# Patient Record
Sex: FEMALE
[De-identification: names, ages, dates, MRNs, and addresses within clinical notes are randomized per-mention and may not be internally consistent; named-entity substitution may affect disease eponyms.]

---

## 2017-03-15 NOTE — RAD
HISTORY:

Sepsis Patient  



COMPARISON:

02/03/2017 



FINDINGS:



LUNGS:

Developing hazy opacity overlying the right upper lobe. Mild 

opacities in the lung bases.



PLEURA:

No significant pleural effusion identified, no pneumothorax 

apparent.Biapical pleural parenchymal thickening noted. 



CARDIOVASCULAR:

Normal.



OSSEOUS STRUCTURES:

The osseous structures demonstrate degenerative changes. 



VISUALIZED UPPER ABDOMEN:

Rounded opacity overlying the projection of the subdiaphragmatic left 

upper abdomen, stable. Otherwise the abdomen remains suboptimally 

seen. 



OTHER FINDINGS:

None.



IMPRESSION:

Developing hazy opacity overlying the right upper lobe. Infectious 

etiologies can be considered.  Follow-up should be obtained.

## 2017-03-15 NOTE — C.PDOC
History Of Present Illness


77F biba from snf for fever. pt denies any complaints, unable to provide 

further hx. 


Time Seen by Provider: 03/15/17 00:40


Chief Complaint (Nursing): Fever





Past Medical History


Vital Signs: 


 Last Vital Signs











Temp  99.3 F   03/18/17 04:00


 


Pulse  63   03/18/17 04:00


 


Resp  20   03/18/17 04:00


 


BP  93/47 L  03/18/17 10:44


 


Pulse Ox  100   03/18/17 04:00














- Medical History


PMH: Anxiety, Arthritis, Back Problems, Gall Bladder Disease, HTN (STRESS TEST 

2015 DR FORD), Seizures (last one about 15 years ago)


Surgical History: Appendectomy (at 16 years old), Cholecystectomy (around 1978)





- [facility] Procedures








ANESTH INJECT-SPIN CANAL (10/03/14)


EXCISION OF ASCENDING COLON, ENDO, DIAGN (02/09/17)


EXCISION OF SIGMOID COLON, ENDO, DIAGN (02/09/17)


EXCISION OF TRANSVERSE COLON, ENDO, DIAGN (02/09/17)


INJECT STEROID (07/31/15)


INTRODUCTION OF ANTI-INFLAMMATORY INTO JOINTS, PERC APPROACH (02/09/17)


SPINAL CANAL INJECT NEC (07/31/15)








Family History: States: Unknown Family Hx





- Social History


Hx Tobacco Use: No


Hx Alcohol Use: No


Hx Substance Use: No





- Immunization History


Hx Tetanus Toxoid Vaccination: Yes


Hx Influenza Vaccination: No


Hx Pneumococcal Vaccination: No





Review Of Systems


Review Of Systems: ROS cannot be obtained secondary to pt's inabilty to answer 

questions.





Physical Exam





- Physical Exam


Appears: No Acute Distress


Skin: Warm, Dry


Head: Atraumatic


Eye(s): bilateral: PERRL, EOMI


Oral Mucosa: Dry


Neck: Normal ROM


Cardiovascular: Rhythm Regular


Respiratory: No Decreased Breath Sounds, No Accessory Muscle Use


Gastrointestinal/Abdominal: Soft, No Tenderness, No Distention


Extremity: No Swelling


Pulses: Left Radial: Normal, Right Radial: Normal


Neurological/Psych: Other (no focla deficits)





ED Course And Treatment





- Laboratory Results


Result Diagrams: 


 03/18/17 06:36





 03/18/17 06:36


O2 Sat by Pulse Oximetry: 90





Critical Care Time





- Critical Care Note


Total Time (in mins): 45


Documented critical care: time excludes all time spent performing seperately 

billable procedures.





Endotracheal Intubation





- Endotracheal Intubation


Intubated With ETT Size: 7 (7.5)


Blade Type Used: Curved (glide scope)


Intubated: Orally


Pre-Intubation Airway Assessment: Ventilated And Oxygenated


Medications Used During Pre-Intubation: Etomidate


Paralyzed With: Succinylcholine


Post-Intubation Assessment: ETT Secured AT (cm): (23), Breath Sounds Equal Bilat

, Placement Confirmed Via CXR, Color Change W/End Tidal CO2 Detector





Medical Decision Making


Medical Decision Making: 


cxr- mild venous congestion





The pts BP improved w IVF bolus from low 80s to high 90s/100s





250am shortly after starting vanco the pt turned diffusely red and developed 

acute resp failure w spo2 into 60's and altered mental status. for this reason 

she was intubated emergently. 





ulysses garcía pcp dr vanessa garcía ICU doctor for admission





Disposition





- Disposition


Disposition: HOSPITALIZED


Disposition Time: 03:12


Condition: GUARDED





- Clinical Impression


Clinical Impression: 


 Sepsis, Acute respiratory failure

## 2017-03-15 NOTE — RAD
HISTORY:

TLC placement  



COMPARISON:

03/15/2017. 



FINDINGS:



LUNGS:

Re- demonstration of possible hazy opacity in the right upper lobe.



PLEURA:

No significant pleural effusion identified, no pneumothorax apparent.



CARDIOVASCULAR:

Normal.



OSSEOUS STRUCTURES:

The osseous structures demonstrate degenerative changes. 



VISUALIZED UPPER ABDOMEN:

Upper abdomen is suboptimally evaluated. Re- demonstration of a 

circular opacity in the left upper quadrant. 



OTHER FINDINGS:

Interval introduction of presumed vascular catheter with the distal 

tip overlying the projection of the expected location of the SVC.



IMPRESSION:

Findings as above.

## 2017-03-15 NOTE — CT
EXAM:

  CT Head Without Intravenous Contrast.



CLINICAL HISTORY:

  77 years old, female; Pain; Other: Sepsis; Patient HX: 2-3-17; Additional 

info: AMS



TECHNIQUE:

  Axial computed tomography images of the head/brain without intravenous 

contrast.  This CT exam was performed using one or more of the following dose 

reduction techniques:  automated exposure control, adjustment of the mA and/or 

kV according to patient size, and/or use of iterative reconstruction technique.



COMPARISON:

  CT - HEAD W/O CONTRAST 2/3/2017 8:24:51 PM



FINDINGS:

  Limitations:  Motion artifact - mild.  Streak artifact - mild.

  Brain:  Mild-to-moderate atrophy.  No definite intracranial hemorrhage.  No 

mass.  Dilated perivascular space vs chronic lacunar infarct about RIGHT basal 

ganglia.  No definite edema.

  Ventricles:  No hydrocephalus.

  Bones/joints:  No acute fracture.

  Soft tissues:  Unremarkable.

  Sinuses:  No acute sinusitis.

  Mastoid air cells:  No mastoid effusion.

  Orbits:  Unremarkable as visualized.

  Tubes, lines and devices:  Endotracheal tube.



IMPRESSION:     

1.  No definite acute intracranial abnormality.  Acute infarction may be CT 

occult within first 24 hours.  If a focal deficit persists, consider followup 

CT or MRI for further evaluation.

2.  Incidental/non-acute findings are described above.

## 2017-03-15 NOTE — CT
EXAM:

  CT Abdomen and Pelvis Without Intravenous Contrast.



CLINICAL HISTORY:

  77 years old, female; Pain; Abdominal pain; Patient HX: 12-5-16; Additional 

info: Sepsis unclear source



TECHNIQUE:

  Axial computed tomography images of the abdomen and pelvis without 

intravenous contrast.  This CT exam was performed using one or more of the 

following dose reduction techniques:  automated exposure control, adjustment of 

the mA and/or kV according to patient size, and/or use of iterative 

reconstruction technique.

  Coronal and sagittal reformatted images were created and reviewed.



COMPARISON:

  No relevant prior studies available.



FINDINGS:

  Limitations:  Motion artifact - mild.  Streak artifact - mild.  Lack of 

intravenous contrast.

  Lower thorax:  Trace bilateral pleural effusions.  Mild peripheral 

atelectasis/scarring with minimal bronchiectasis.  Mild interlobular septal 

thickening, nonspecific.  Small hiatal hernia.



 ABDOMEN:

  Liver:  Unremarkable.

  Gallbladder and bile ducts:  Gallbladder not visualized.  No ductal dilation.



  Pancreas:  Unremarkable.  No ductal dilation.

  Spleen:  6.0 x 4.6 x 4.7 cm peripherally calcified lesion.  No splenomegaly.

  Adrenals:  No mass.

  Kidneys and ureters:  No renal calculi.  No hydronephrosis.

  Stomach and bowel:  Mild to moderate mural thickening of large bowel with few 

areas of sparing.  Mild stranding within adjacent fat.

  Appendix:  No findings to suggest acute appendicitis.



 PELVIS:

  Bladder:  Collapsed bladder around Gonzalez catheter, limiting evaluation.  

  Reproductive:  Unremarkable as visualized.



 ABDOMEN and PELVIS:

  Intraperitoneal space:  No significant fluid collection.  No free air.

  Bones/joints:  RIGHT hip arthroplasty.  Subacute to chronic T9 compression 

deformity.  Degenerative changes of spine.

  Soft tissues:  Unremarkable.

  Vasculature:  Mild atherosclerotic disease.  No abdominal aortic aneurysm.

  Lymph nodes:  No pathologically enlarged lymph nodes.



IMPRESSION:     

1.  Colitis, nonspecific.  Consider inflammatory or infectious etiologies.

2.  Splenic lesion, indeterminate.  Consider nonemergent MRI.

3.  Incidental/non-acute findings are described above.

## 2017-03-15 NOTE — RAD
HISTORY:

tube placement  



COMPARISON:

03/15/2017 



FINDINGS:



LUNGS:

Hazy opacity in the right upper lobe.



PLEURA:

No significant pleural effusion identified, no pneumothorax apparent.



CARDIOVASCULAR:

Stable cardiomediastinal silhouette. Tortuous ascending aorta.



OSSEOUS STRUCTURES:

The osseous structures demonstrate degenerative changes. 



VISUALIZED UPPER ABDOMEN:

Upper abdomen is suboptimally evaluated. Rounded opacity overlying 

the projection of the spleen again seen. 



OTHER FINDINGS:

Interval introduction of endotracheal tube with the distal tip above 

the tracheal bifurcation.



IMPRESSION:

Interval introduction of endotracheal tube with the distal tip above 

the tracheal bifurcation. Other findings as above.

## 2017-03-15 NOTE — CP.PCM.CON
History of Present Illness





- History of Present Illness


History of Present Illness: 


CC: Sepsis





HPI: Patient admitted overnight with septic shock, intubated, diarrhea, marked 

leukocytosis. Patient discharged from  one month ago. We consulted on the 

patient at that admission for diarrhea and colon distension. Multiple stools 

for CDiff were negative, and patient had not been on antibiotics. A colonoscopy 

was performed and was essentially unremarkable, and numerous biopsies from the 

colon were nondiagnostic. Stool electrolytes showed an osmotic gap of 200, 

consistent with osmotic diarrhea. The diarrhea and abdominal distension on that 

admission resolved with conservative therapy. Now the patient is intubated, on 

pressors, and appears to be septic. CT Abdomen shows mild colon thickening, 

possibly consistent with Colitis. She also has Euceda's Esophagus, and a 

longstanding history of chronic constipation, dependent on Linzess and Miralax, 

followed by Dr Velasquez in [place].





Review of Systems





- Review of Systems


Systems not reviewed;Unavailable: Unstable Vital Signs, Intubated





Past Patient History





- Infectious Disease


Hx of Infectious Diseases: None





- Tetanus Immunizations


Tetanus Immunization: Unknown, >10 years Ago





- Past Medical History & Family History


Past Medical History?: Yes





- Past Social History


Smoking Status: Never Smoked


Alcohol: None





- CARDIAC


Hx Hypertension: Yes (STRESS TEST 2015 DR FORD)





- PULMONARY


Hx Respiratory Disorders: No





- NEUROLOGICAL


Hx Seizures: Yes (last one about 15 years ago)





- HEENT


Hx HEENT Problems: Yes


Hx Cataracts: Yes





- RENAL


Hx Chronic Kidney Disease: No





- ENDOCRINE/METABOLIC


Hx Endocrine Disorders: No





- HEMATOLOGICAL/ONCOLOGICAL


Hx Blood Disorders: No





- INTEGUMENTARY


Hx Dermatological Problems: No





- MUSCULOSKELETAL/RHEUMATOLOGICAL


Hx Arthritis: Yes





- GASTROINTESTINAL


Hx Gall Bladder Disease: Yes





- GENITOURINARY/GYNECOLOGICAL


Hx Genitourinary Disorders: Yes


Hx Incontinence: Yes





- PSYCHIATRIC


Hx Anxiety: Yes


Hx Substance Use: No





- SURGICAL HISTORY


Hx Appendectomy: Yes (at 16 years old)


Hx Cholecystectomy: Yes (around 1978)





- ANESTHESIA


Hx Anesthesia: Yes


Hx Anesthesia Reactions: Yes (difficulty waking up)


Hx Malignant Hyperthermia: No





Meds


Allergies/Adverse Reactions: 


 Allergies











Allergy/AdvReac Type Severity Reaction Status Date / Time


 


Influenza Virus Vaccines Allergy  COUGH Verified 03/15/17 01:03


 


Penicillins Allergy  URTICARIA Verified 03/15/17 01:03


 


vancomycin Allergy   Verified 03/15/17 07:09














- Medications


Medications: 


 Current Medications





Acetaminophen (Tylenol 325mg Tab)  650 mg PO Q4 PRN


   PRN Reason: Fever >100.4 F


Famotidine (Pepcid)  20 mg IVP Q12 [facility]


   Last Admin: 03/15/17 09:52 Dose:  20 mg


Heparin Sodium (Porcine) (Heparin)  5,000 units SC Q8 [facility]


   Last Admin: 03/15/17 06:08 Dose:  5,000 units


Sodium Chloride (Sodium Chloride 0.9%)  1,000 mls @ 200 mls/hr IV .Q5H [facility]


   Last Admin: 03/15/17 08:00 Dose:  200 mls/hr


Metronidazole (Flagyl)  100 mls @ 100 mls/hr IVPB Q8 [facility]


   Last Admin: 03/15/17 07:02 Dose:  100 mls/hr


Norepinephrine Bitartrate 4 mg (/ Sodium Chloride)  254 mls @ 15.24 mls/hr IV 

.[id number] PRN; Protocol; 4 MCG/MIN


   PRN Reason: TITRATE PER MD ORDER


   Last Titration: 03/15/17 10:32 Dose:  6 mcg/min


Imipenem/Cilastatin Sodium 250 (mg/ Sodium Chloride)  100 mls @ 100 mls/hr IVPB 

Q6H [facility]


   Last Admin: 03/15/17 12:18 Dose:  100 mls/hr


Vancomycin HCl (Vancocin (Oral Or Rectal Use))  500 mg PO QID [facility]


   Last Admin: 03/15/17 11:04 Dose:  500 mg











Physical Exam





- Constitutional


Appears: Toxic, In Acute Distress


Additional comments: 


Pale





- Head Exam


Head Exam: NORMOCEPHALIC





- Eye Exam


Eye Exam: absent: Scleral icterus





- ENT Exam


ENT Exam: Mucous Membranes Dry


Additional comments: 


ETT





- Neck Exam


Neck exam: Positive for: Normal Inspection





- Respiratory Exam


Respiratory Exam: Decreased Breath Sounds





- Cardiovascular Exam


Cardiovascular Exam: Tachycardia, REGULAR RHYTHM





- GI/Abdominal Exam


GI & Abdominal Exam: Hypoactive Bowel Sounds, Soft.  absent: Distended, Mass, 

Tenderness





- Rectal Exam


Rectal Exam: Deferred





- Extremities Exam


Extremities exam: Positive for: normal inspection





- Neurological Exam


Neurological exam: Altered


Additional comments: 


Sedated





- Psychiatric Exam


Additional comments: 


Unable to assess





- Skin


Skin Exam: Pallor





Results





- Vital Signs


Recent Vital Signs: 


 Last Vital Signs











Temp  99.1 F   03/15/17 08:00


 


Pulse  72   03/15/17 09:42


 


Resp  14   03/15/17 09:42


 


BP  80/42 L  03/15/17 09:42


 


Pulse Ox  100   03/15/17 09:42














- Labs


Result Diagrams: 


 03/15/17 06:08





 03/15/17 06:08


Labs: 


 Laboratory Results - last 24 hr











  03/15/17 03/15/17 03/15/17





  05:25 06:08 06:14


 


WBC   50.1 H* 


 


RBC   4.66 


 


Hgb   12.9 


 


Hct   38.5 


 


MCV   82.7 


 


MCH   27.7 


 


MCHC   33.5 


 


RDW   14.5 


 


Plt Count   406 H 


 


MPV   8.2 


 


Neut % (Auto)   87.3 H 


 


Lymph % (Auto)   3.5 L 


 


Mono % (Auto)   8.9 


 


Eos % (Auto)   0.1 


 


Baso % (Auto)   0.2 


 


Neut #   43.8 H 


 


Lymph #   1.7 


 


Mono #   4.5 H 


 


Eos #   0.0 


 


Baso #   0.1 


 


Neutrophils % (Manual)   31 L 


 


Band Neutrophils %   55 H* 


 


Lymphocytes % (Manual)   2 L 


 


Reactive Lymphs %   1 H 


 


Monocytes % (Manual)   8 


 


Metamyelocytes %   1 H 


 


Myelocytes %   2 H 


 


Toxic Granulation   Present 


 


Dohle Bodies   Present 


 


Jos Rods    


 


Platelet Estimate   Slightly increased H 


 


Plt Clumps, EDTA   Present 


 


Large Platelets   Present 


 


Giant Platelets   Present 


 


Poikilocytosis (manual   Slight 


 


Ovalocytes   Slight 


 


Smear Path Review    


 


Puncture Site  Lr  


 


pCO2  29 L  


 


pO2  312 H  


 


HCO3  18.6 L  


 


ABG pH  7.35  


 


ABG Total CO2  16.9 L  


 


ABG O2 Saturation  100.0 H  


 


ABG Base Excess  -8.2 L  


 


Martin Test  Pos  


 


ABG Potassium  3.3 L  


 


A-a O2 Difference  365.0  


 


Respiratory Index  1.2  


 


Sodium  122.0 L  118 L* 


 


Chloride  96.0 L  87 L 


 


Glucose  107 H  


 


Lactate  2.0  


 


Mechanical Rate  14  


 


FiO2  100.0  


 


Tidal Volume  500  


 


PEEP  5  


 


Potassium   3.5 L 


 


Carbon Dioxide   17 L 


 


Anion Gap   18 


 


BUN   23 H 


 


Creatinine   1.1 


 


Est GFR ( Amer)   58 


 


Est GFR (Non-Af Amer)   48 


 


Random Glucose   112 H 


 


Serum Osmolality   259 L 


 


Lactic Acid    1.9


 


Calcium   6.9 L 


 


Phosphorus   3.7 


 


Magnesium   1.6 


 


Total Bilirubin   0.9 


 


AST   49 H D 


 


ALT   26 


 


Alkaline Phosphatase   68 


 


Total Protein   5.5 L 


 


Albumin   2.3 L D 


 


Globulin   3.2 


 


Albumin/Globulin Ratio   0.7 L 


 


TSH 3rd Generation   7.83 H 


 


Arterial Blood Potassium  3.3 L  


 


Urine Osmolality   


 


Ur Random Sodium   


 


Influenza Typ A,B (EIA)   














  03/15/17 03/15/17 03/15/17





  06:21 07:57 10:43


 


WBC   


 


RBC   


 


Hgb   


 


Hct   


 


MCV   


 


MCH   


 


MCHC   


 


RDW   


 


Plt Count   


 


MPV   


 


Neut % (Auto)   


 


Lymph % (Auto)   


 


Mono % (Auto)   


 


Eos % (Auto)   


 


Baso % (Auto)   


 


Neut #   


 


Lymph #   


 


Mono #   


 


Eos #   


 


Baso #   


 


Neutrophils % (Manual)   


 


Band Neutrophils %   


 


Lymphocytes % (Manual)   


 


Reactive Lymphs %   


 


Monocytes % (Manual)   


 


Metamyelocytes %   


 


Myelocytes %   


 


Toxic Granulation   


 


Dohle Bodies   


 


Jos Rods   


 


Platelet Estimate   


 


Plt Clumps, EDTA   


 


Large Platelets   


 


Giant Platelets   


 


Poikilocytosis (manual   


 


Ovalocytes   


 


Smear Path Review   


 


Puncture Site   


 


pCO2   


 


pO2   


 


HCO3   


 


ABG pH   


 


ABG Total CO2   


 


ABG O2 Saturation   


 


ABG Base Excess   


 


Martin Test   


 


ABG Potassium   


 


A-a O2 Difference   


 


Respiratory Index   


 


Sodium   


 


Chloride   


 


Glucose   


 


Lactate   


 


Mechanical Rate   


 


FiO2   


 


Tidal Volume   


 


PEEP   


 


Potassium   


 


Carbon Dioxide   


 


Anion Gap   


 


BUN   


 


Creatinine   


 


Est GFR ( Amer)   


 


Est GFR (Non-Af Amer)   


 


Random Glucose   


 


Serum Osmolality    261 L


 


Lactic Acid   


 


Calcium   


 


Phosphorus   


 


Magnesium   


 


Total Bilirubin   


 


AST   


 


ALT   


 


Alkaline Phosphatase   


 


Total Protein   


 


Albumin   


 


Globulin   


 


Albumin/Globulin Ratio   


 


TSH 3rd Generation   


 


Arterial Blood Potassium   


 


Urine Osmolality   275 L 


 


Ur Random Sodium   7 


 


Influenza Typ A,B (EIA)  Negative for flu a/b  














Assessment & Plan


(1) Acute respiratory failure


Assessment and Plan: 


On Vent, in ICU. Managed by critical are attending.


Status: Acute





(2) Hyponatremia


Assessment and Plan: 


Very Hyponatremic. Fluid management per primary and critical care team. 


Status: Acute





(3) Sepsis


Assessment and Plan: 


Marked leukocytosis, septic shock.


Need to cover for possible CDiff colitis, and patient is appropriately begun on 

PO Vanco + IV Flagyl.


Abdominal exam is benign, and CT does not show major bowel abnormalities.


Status: Acute





(4) GERD (gastroesophageal reflux disease)


Assessment and Plan: 


Presently stable. Long term PPI use.


Status: Chronic   Priority: Medium

## 2017-03-15 NOTE — CARD
--------------- APPROVED REPORT --------------





EKG Measurement

Heart [id number]

WV [id number]

[id number]

[id number]

[id number]



<Conclusion>

Normal sinus rhythm

Normal ECG

## 2017-03-16 NOTE — CP.PCM.CON
History of Present Illness





- History of Present Illness


History of Present Illness: 


INFECTIOUS DISEASE CONSULTATION DICTATED #[id number]


NANCY JUÁREZ MD, FACP


3/15/2017


ICU 6





SEE DICTATED CONSULTATION AND DISCUSSION WITH DR CHARMAINE ESTRADA


STABILIZE, IV FLUIDS, AND LAB EVALUATION


CONSIDER SURGICAL CONSULTATION WITH DR VILLALOBOS





OF NOTE, BOTH HIS FAMILY AND STAFF MENTION THAT HER REHAB/NURSING HOME SHE WAS 

"SELF TAKING" IMODIUM!





THANK YOU


JAGDEEP JUÁREZ MD. FACP





Past Patient History





- Infectious Disease


Hx of Infectious Diseases: None





- Tetanus Immunizations


Tetanus Immunization: Unknown, >10 years Ago





- Past Medical History & Family History


Past Medical History?: Yes





- Past Social History


Smoking Status: Never Smoked


Alcohol: None





- CARDIAC


Hx Hypertension: Yes (STRESS TEST 2015 DR FORD)





- PULMONARY


Hx Respiratory Disorders: No





- NEUROLOGICAL


Hx Seizures: Yes (last one about 15 years ago)





- HEENT


Hx HEENT Problems: Yes


Hx Cataracts: Yes





- RENAL


Hx Chronic Kidney Disease: No





- ENDOCRINE/METABOLIC


Hx Endocrine Disorders: No





- HEMATOLOGICAL/ONCOLOGICAL


Hx Blood Disorders: No





- INTEGUMENTARY


Hx Dermatological Problems: No





- MUSCULOSKELETAL/RHEUMATOLOGICAL


Hx Arthritis: Yes





- GASTROINTESTINAL


Hx Gall Bladder Disease: Yes





- GENITOURINARY/GYNECOLOGICAL


Hx Genitourinary Disorders: Yes


Hx Incontinence: Yes





- PSYCHIATRIC


Hx Anxiety: Yes


Hx Substance Use: No





- SURGICAL HISTORY


Hx Appendectomy: Yes (at 16 years old)


Hx Cholecystectomy: Yes (around 1978)





- ANESTHESIA


Hx Anesthesia: Yes


Hx Anesthesia Reactions: Yes (difficulty waking up)


Hx Malignant Hyperthermia: No





Meds


Allergies/Adverse Reactions: 


 Allergies











Allergy/AdvReac Type Severity Reaction Status Date / Time


 


Influenza Virus Vaccines Allergy  COUGH Verified 03/15/17 01:03


 


Penicillins Allergy  URTICARIA Verified 03/15/17 01:03


 


vancomycin Allergy   Verified 03/15/17 07:09














- Medications


Medications: 


 Current Medications





Acetaminophen (Tylenol 325mg Tab)  650 mg PO Q4 PRN


   PRN Reason: Fever >100.4 F


Famotidine (Pepcid)  20 mg IVP Q12 [facility]


   Last Admin: 03/15/17 21:57 Dose:  20 mg


Heparin Sodium (Porcine) (Heparin)  5,000 units SC Q8 [facility]


   Last Admin: 03/15/17 21:58 Dose:  5,000 units


Metronidazole (Flagyl)  100 mls @ 100 mls/hr IVPB Q8 [facility]


   Last Admin: 03/15/17 21:57 Dose:  100 mls/hr


Norepinephrine Bitartrate 4 mg (/ Sodium Chloride)  254 mls @ 15.24 mls/hr IV 

.[id number] PRN; Protocol; 4 MCG/MIN


   PRN Reason: TITRATE PER MD ORDER


   Last Admin: 03/15/17 18:25 Dose:  30.48 mls/hr


Imipenem/Cilastatin Sodium 250 (mg/ Sodium Chloride)  100 mls @ 100 mls/hr IVPB 

Q6H [facility]


   Last Admin: 03/15/17 23:30 Dose:  100 mls/hr


Sodium Chloride (Sodium Chloride 0.9%)  1,000 mls @ 125 mls/hr IV .Q8H [facility]


   Last Admin: 03/15/17 20:50 Dose:  125 mls/hr


Vancomycin HCl (Vancocin (Oral Or Rectal Use))  500 mg PO QID [facility]


   Last Admin: 03/15/17 21:59 Dose:  500 mg











Results





- Vital Signs


Recent Vital Signs: 


 Last Vital Signs











Temp  97.9 F   03/15/17 16:00


 


Pulse  80   03/15/17 19:00


 


Resp  15   03/15/17 19:00


 


BP  107/43 L  03/15/17 18:57


 


Pulse Ox  95   03/15/17 19:00














- Labs


Result Diagrams: 


 03/18/17 06:36





 03/18/17 06:36


Labs: 


 Laboratory Results - last 24 hr











  03/15/17 03/15/17 03/15/17





  05:25 06:08 06:14


 


WBC   50.1 H* 


 


RBC   4.66 


 


Hgb   12.9 


 


Hct   38.5 


 


MCV   82.7 


 


MCH   27.7 


 


MCHC   33.5 


 


RDW   14.5 


 


Plt Count   406 H 


 


MPV   8.2 


 


Neut % (Auto)   87.3 H 


 


Lymph % (Auto)   3.5 L 


 


Mono % (Auto)   8.9 


 


Eos % (Auto)   0.1 


 


Baso % (Auto)   0.2 


 


Neut #   43.8 H 


 


Lymph #   1.7 


 


Mono #   4.5 H 


 


Eos #   0.0 


 


Baso #   0.1 


 


Neutrophils % (Manual)   31 L 


 


Band Neutrophils %   55 H* 


 


Lymphocytes % (Manual)   2 L 


 


Reactive Lymphs %   1 H 


 


Monocytes % (Manual)   8 


 


Metamyelocytes %   1 H 


 


Myelocytes %   2 H 


 


Toxic Granulation   Present 


 


Dohle Bodies   Present 


 


Jos Rods    


 


Platelet Estimate   Slightly increased H 


 


Plt Clumps, EDTA   Present 


 


Large Platelets   Present 


 


Giant Platelets   Present 


 


Poikilocytosis (manual   Slight 


 


Ovalocytes   Slight 


 


Holden Cells   


 


Smear Path Review    


 


Puncture Site  Lr  


 


pCO2  29 L  


 


pO2  312 H  


 


HCO3  18.6 L  


 


ABG pH  7.35  


 


ABG Total CO2  16.9 L  


 


ABG O2 Saturation  100.0 H  


 


ABG Base Excess  -8.2 L  


 


Martin Test  Pos  


 


ABG Potassium  3.3 L  


 


A-a O2 Difference  365.0  


 


Respiratory Index  1.2  


 


Sodium  122.0 L  118 L* 


 


Chloride  96.0 L  87 L 


 


Glucose  107 H  


 


Lactate  2.0  


 


Mechanical Rate  14  


 


FiO2  100.0  


 


Tidal Volume  500  


 


PEEP  5  


 


Potassium   3.5 L 


 


Carbon Dioxide   17 L 


 


Anion Gap   18 


 


BUN   23 H 


 


Creatinine   1.1 


 


Est GFR ( Amer)   58 


 


Est GFR (Non-Af Amer)   48 


 


Random Glucose   112 H 


 


Serum Osmolality   259 L 


 


Lactic Acid    1.9


 


Calcium   6.9 L 


 


Phosphorus   3.7 


 


Magnesium   1.6 


 


Total Bilirubin   0.9 


 


AST   49 H D 


 


ALT   26 


 


Alkaline Phosphatase   68 


 


Total Protein   5.5 L 


 


Albumin   2.3 L D 


 


Globulin   3.2 


 


Albumin/Globulin Ratio   0.7 L 


 


TSH 3rd Generation   7.83 H 


 


Arterial Blood Potassium  3.3 L  


 


Urine Osmolality   


 


Ur Random Sodium   


 


Influenza Typ A,B (EIA)   














  03/15/17 03/15/17 03/15/17





  06:21 07:57 10:43


 


WBC   


 


RBC   


 


Hgb   


 


Hct   


 


MCV   


 


MCH   


 


MCHC   


 


RDW   


 


Plt Count   


 


MPV   


 


Neut % (Auto)   


 


Lymph % (Auto)   


 


Mono % (Auto)   


 


Eos % (Auto)   


 


Baso % (Auto)   


 


Neut #   


 


Lymph #   


 


Mono #   


 


Eos #   


 


Baso #   


 


Neutrophils % (Manual)   


 


Band Neutrophils %   


 


Lymphocytes % (Manual)   


 


Reactive Lymphs %   


 


Monocytes % (Manual)   


 


Metamyelocytes %   


 


Myelocytes %   


 


Toxic Granulation   


 


Dohle Bodies   


 


Jos Rods   


 


Platelet Estimate   


 


Plt Clumps, EDTA   


 


Large Platelets   


 


Giant Platelets   


 


Poikilocytosis (manual   


 


Ovalocytes   


 


Holden Cells   


 


Smear Path Review   


 


Puncture Site   


 


pCO2   


 


pO2   


 


HCO3   


 


ABG pH   


 


ABG Total CO2   


 


ABG O2 Saturation   


 


ABG Base Excess   


 


Martin Test   


 


ABG Potassium   


 


A-a O2 Difference   


 


Respiratory Index   


 


Sodium   


 


Chloride   


 


Glucose   


 


Lactate   


 


Mechanical Rate   


 


FiO2   


 


Tidal Volume   


 


PEEP   


 


Potassium   


 


Carbon Dioxide   


 


Anion Gap   


 


BUN   


 


Creatinine   


 


Est GFR ( Amer)   


 


Est GFR (Non-Af Amer)   


 


Random Glucose   


 


Serum Osmolality    261 L


 


Lactic Acid   


 


Calcium   


 


Phosphorus   


 


Magnesium   


 


Total Bilirubin   


 


AST   


 


ALT   


 


Alkaline Phosphatase   


 


Total Protein   


 


Albumin   


 


Globulin   


 


Albumin/Globulin Ratio   


 


TSH 3rd Generation   


 


Arterial Blood Potassium   


 


Urine Osmolality   275 L 


 


Ur Random Sodium   7 


 


Influenza Typ A,B (EIA)  Negative for flu a/b  














  03/15/17





  13:55


 


WBC  59.0 H*


 


RBC  4.43


 


Hgb  11.8


 


Hct  36.7


 


MCV  82.8


 


MCH  26.7 L


 


MCHC  32.3 L


 


RDW  14.9 H


 


Plt Count  369


 


MPV  8.0


 


Neut % (Auto)  93.8 H


 


Lymph % (Auto)  2.0 L


 


Mono % (Auto)  3.8


 


Eos % (Auto)  0.0


 


Baso % (Auto)  0.4


 


Neut #  55.4 H


 


Lymph #  1.2


 


Mono #  2.2 H


 


Eos #  0.0


 


Baso #  0.2


 


Neutrophils % (Manual)  40 L


 


Band Neutrophils %  49 H*


 


Lymphocytes % (Manual)  1 L


 


Reactive Lymphs % 


 


Monocytes % (Manual)  6


 


Metamyelocytes %  2 H


 


Myelocytes %  2 H


 


Toxic Granulation  Present


 


Dohle Bodies  Present


 


Jos Rods  


 


Platelet Estimate  Normal


 


Plt Clumps, EDTA 


 


Large Platelets  Present


 


Giant Platelets 


 


Poikilocytosis (manual  Slight


 


Ovalocytes 


 


Mila Cells  Slight


 


Smear Path Review 


 


Puncture Site 


 


pCO2 


 


pO2 


 


HCO3 


 


ABG pH 


 


ABG Total CO2 


 


ABG O2 Saturation 


 


ABG Base Excess 


 


Martin Test 


 


ABG Potassium 


 


A-a O2 Difference 


 


Respiratory Index 


 


Sodium  122 L


 


Chloride  92 L


 


Glucose 


 


Lactate 


 


Mechanical Rate 


 


FiO2 


 


Tidal Volume 


 


PEEP 


 


Potassium  4.0


 


Carbon Dioxide  18 L


 


Anion Gap  16


 


BUN  21 H


 


Creatinine  0.9


 


Est GFR ( Amer)  > 60


 


Est GFR (Non-Af Amer)  > 60


 


Random Glucose  147 H


 


Serum Osmolality 


 


Lactic Acid 


 


Calcium  6.3 L


 


Phosphorus 


 


Magnesium  2.1


 


Total Bilirubin  0.3


 


AST  34


 


ALT  28


 


Alkaline Phosphatase  64


 


Total Protein  5.2 L


 


Albumin  2.2 L


 


Globulin  3.0


 


Albumin/Globulin Ratio  0.7 L


 


TSH 3rd Generation 


 


Arterial Blood Potassium 


 


Urine Osmolality 


 


Ur Random Sodium 


 


Influenza Typ A,B (EIA)

## 2017-03-16 NOTE — CON
DATE: 03/15/2017



INFECTIOUS DISEASE CONSULTATION



Chart reviewed.  The patient's examination noted.  Case discussed with ICU resident, Dr. Carter st.  Her phone number is [phone].



The patient is in ICU/CCU, bed 6.  



The patient is a 77-year-old fragile female transferred from rehab/nursing home because of acute abdo
ariela pain, hypotension, and shock-like state requiring intubation.



PREVIOUS PAST MEDICAL ISSUES:  Including, but not limited to:

1.  Accidental fall with abrasions, sprains of her left knee, treated with rest, analgesics, and no a
ntibiotics.

2.  Recent pseudo-obstruction.

3.  Seizure disorder, longstanding.

4.  Chronic constipation, longstanding.

5.  GERD.

6.  Status post left knee replacement approximately 1995.

7.  Status post right knee replacement in 2004.

8.  Status post right hip replacement in 2005.

9.  Decreased hearing.

10.  Status post hysterectomy, 1976.

11.  Status post appendectomy when she was much younger.

12.  Cataracts.

13.  Stress test and under the cardiac care of Dr. Giuliano Walter.



ALLERGIES:  SHE HAS EXTREMELY VAGUE PENICILLIN ALLERGY?



SOCIAL HISTORY:  No tobacco or ethanol use.



FAMILY HISTORY:  Not applicable.



The patient was admitted in septic shock-like state with marked leukocytosis and hypotension.



The infectious disease consultation was requested for possible C. diff/colitis, as per the CAT scan. 
 



Of note, on her previous hospitalization, she was seen by GI for diarrhea and colonic distention.  St
ools were all negative for C. diff, and the patient had not been on antibiotics for many months.  On 
that hospitalization, her diarrhea and abdominal distention resolved with conservative therapy.  As m
entioned, CAT scan shows some colonic thickening, possibly consistent with colitis.  There is a quest
ion of dilated colon with bowel dystonia and underlying malignancy.



LABORATORY DATA:  Of note, on this admission, her white count appears to be approximately 50,100, hem
oglobin 12.9, hematocrit 38.5 with 406 platelets.  Of note, 55% bands, 31% segs.  Sodium between 118 
and 122.  Chloride is between 87 and 96.  Sugar of 107, BUN of 23, creatinine of 1.  GFR is 48 with a
 lactate of 2.  



Reviewed her old records.  She is intubated on fluids, pressors, H2 blockers, Tylenol for fever.  



PHYSICAL EXAMINATION: 

GENERAL:  She appeared toxic, pale.  

HEENT:  Anicteric.  Dry mucous membranes.

NECK:  Not rigid, but decreased range of motion in all directions.

LUNGS:  Decreased breath sounds.

CARDIAC:  Tachycardic.

ABDOMEN:  Hypoactive bowel sounds appreciated.

EXTREMITIES:  Noted.



VITAL SIGNS:  BP earlier in the day 80/42.  Pulse of 72, respirations 14, pulse ox of 100, temperatur
e of 99.1.



LABORATORY DATA:   As reviewed.  



IMPRESSION:  From the infectious disease point of view:

1.  Findings of acute respiratory dysfunction managed by the critical care team.

2.  Severe hyponatremia, new.

3.  Marked leukocytosis, possible septic shock, etiology to be determined, along with possible coliti
s, etiology to be determined.  I agree with coverage for Clostridium difficile, either via nasogastri
c tube or per rectum, IV Flagyl, and adjust the doses of imipenem.  I would recommend giving a millig
kerri of imipenem at 100 mL of compatible fluid as a test dose to infuse over half an hour.  If no imme
diate adverse effects, continue with appropriate imipenem doses.  Suggest a 500 IV piggyback q. 8 to 
q. 6 hourly.  

4.  Also treatment of her gastroesophageal reflux disease should be covered with proton pump inhibito
rs.  



I will follow this patient as needed.  We will discuss with the family accordingly - that is her brot
her.





__________________________________________

Nisreen Nickerson MD







cc:



DD: 03/16/2017 00:56:51  [id number]

TT: 03/16/2017 08:47:56

Confirmation # [id number]

Dictation # [id number]

shonna

## 2017-03-16 NOTE — CP.PCM.PN
Subjective





- Date & Time of Evaluation


Date of Evaluation: 03/16/17


Time of Evaluation: 20:36





- Subjective


Subjective: 


INFECTIOUS DISEASE PROGRESS NOTE


NANCY JUÁREZ MD, FACP


ICU/CCU #6 3/16/2017





CHART REVIEWED


PT EXAMINED


CASE REDISCUSSED WITH HOSPITAL STAFF





AWAKE AND ALERT BUT INTUBATED STILL.


C. DIFF COMES BACK POSITIVE-THIS IS FIRST TIME IN HER RECENT ADMISSIONS.


ALREADY ON PO AND RECTAL VANCOMYCIN 500MG -HIGH DOSE.


TO D/C THE PRIMAXIN, NO RESAON TO BE ON RENAL DOSES,- MY ORDER WAS FOR PRIMAXIN 

500MG IVPB Q 6-8 HOURS.


FOLLOW CAREFULLY, DISCUSSED WITH HER BROTHER.





Objective





- Vital Signs/Intake and Output


Vital Signs (last 24 hours): 


 











Temp Pulse Resp BP Pulse Ox


 


 99.6 F   98 H  23   104/39 L  99 


 


 03/16/17 16:00  03/16/17 17:34  03/16/17 17:34  03/16/17 17:34  03/16/17 17:34








Intake and Output: 


 











 03/16/17 03/17/17





 18:59 06:59


 


Intake Total 1811.5 


 


Output Total 372 


 


Balance 1439.5 














- Medications


Medications: 


 Current Medications





Acetaminophen (Tylenol 325mg Tab)  650 mg PO Q4 PRN


   PRN Reason: Fever >100.4 F


   Last Admin: 03/16/17 09:14 Dose:  650 mg


Famotidine (Pepcid)  20 mg IVP Q12 [facility]


   Last Admin: 03/16/17 10:23 Dose:  20 mg


Heparin Sodium (Porcine) (Heparin)  5,000 units SC Q8 [facility]


   Last Admin: 03/16/17 14:36 Dose:  5,000 units


Metronidazole (Flagyl)  100 mls @ 100 mls/hr IVPB Q8 [facility]


   Last Admin: 03/16/17 14:34 Dose:  100 mls/hr


Norepinephrine Bitartrate 4 mg (/ Sodium Chloride)  254 mls @ 15.24 mls/hr IV 

.[id number] PRN; Protocol; 4 MCG/MIN


   PRN Reason: TITRATE PER MD ORDER


   Last Admin: 03/16/17 17:34 Dose:  30.48 mls/hr


Imipenem/Cilastatin Sodium 250 (mg/ Sodium Chloride)  100 mls @ 100 mls/hr IVPB 

Q6H [facility]


   Last Admin: 03/16/17 17:32 Dose:  100 mls/hr


Sodium Chloride (Sodium Chloride 0.9%)  1,000 mls @ 125 mls/hr IV .Q8H [facility]


   Last Admin: 03/16/17 14:33 Dose:  Not Given


Multivitamins/Vitamin C 10 ml/ (Amino Acids)  1,010 mls @ 42 mls/hr IV .Q24H [facility]


   Stop: 03/17/17 17:59


   Last Admin: 03/16/17 17:44 Dose:  42 mls/hr


Fat Emulsion Intravenous (Intralipid 20%)  250 mls @ 42 mls/hr IV TuThSa@1800 

[facility]


   Stop: 03/21/17 23:58


   Last Admin: 03/16/17 17:41 Dose:  42 mls/hr


Vancomycin HCl (Vancocin (Oral Or Rectal Use))  500 mg PO QID [facility]


   Last Admin: 03/16/17 17:37 Dose:  500 mg


Vancomycin HCl (Vancocin (Oral Or Rectal Use))  500 mg DE TID [facility]


   Last Admin: 03/16/17 17:39 Dose:  500 mg











- Labs


Labs: 


 





 03/16/17 06:34 





 03/16/17 06:34 





 











PT  14.8 SECONDS (9.7-12.2)  H  03/15/17  01:01    


 


INR  1.3   03/15/17  01:01    


 


APTT  37 SECONDS (21-34)  H  03/15/17  01:01    














- Constitutional


Appears: In Acute Distress, Older Than Stated Age, Chronically Ill





- Head Exam


Head Exam: ATRAUMATIC, NORMOCEPHALIC





- Eye Exam


Eye Exam: Normal appearance





- ENT Exam


ENT Exam: Mucous Membranes Dry





- Neck Exam


Neck Exam: absent: Meningismus, Tenderness





- Respiratory Exam


Respiratory Exam: Decreased Breath Sounds, NORMAL BREATHING PATTERN





- Cardiovascular Exam


Cardiovascular Exam: REGULAR RHYTHM





- GI/Abdominal Exam


GI & Abdominal Exam: Distended, Rigid, Tenderness, Diminished Bowel Sounds, 

Hypoactive Bowel Sounds.  absent: Mass, Organomegaly





- Rectal Exam


Rectal Exam: Deferred





- Extremities Exam


Extremities Exam: absent: Joint Swelling, Pedal Edema, Tenderness





- Back Exam


Back Exam: absent: muscle spasm, rash noted





- Neurological Exam


Neurological Exam: Alert, Awake.  absent: Oriented x3





- Psychiatric Exam


Psychiatric exam: Anxious, Depressed





- Skin


Skin Exam: Dry, Intact, Pallor, Warm.  absent: Petechiae, Rash, Urticaria, 

Vesicles





Assessment and Plan


(1) C. difficile colitis


Assessment & Plan: 


ON PO AND DE VANCOMYCIN AND IV FLAGYL


D/C PRIMAXIN


Status: Acute





(2) Acute respiratory failure


Status: Acute





(3) Hyponatremia


Status: Acute





(4) GERD (gastroesophageal reflux disease)


Status: Chronic





(5) Knee injury


Status: Chronic





(6) Sepsis


Status: Acute





(7) Bladder incontinence


Status: Chronic





(8) Abdominal pain


Assessment & Plan: 


ASSOCIATED WITH C. DIFF COLITIS


Status: Acute





(9) Seizure disorder


Status: Chronic

## 2017-03-16 NOTE — CP.CCUPN
<Hadley Spencer - Last Filed: 17 17:58>





CCU Subjective





- Physician Review


Subjective (Free Text): 


17 17:58


Patient seen at bedside and is in no acute distress.  Patient is intubated Vent 

settings ( FiO2 50% RR 14 PEEP 5 ). Isolation precautions in place due to 

c. diff colitis findings. Patient cannot comply to an ROS at this time due to 

intubation and sedation. Family bedside and informed of management.. 





17 18:02








CCU Objective





- Vital Signs / Intake & Output


Intake and Output (Last 8hrs): 


 Intake & Output











 03/15/17 03/16/17 03/16/17





 22:59 06:59 14:59


 


Intake Total 1395 1086.3 125


 


Output Total 540 375 45


 


Balance 855 711.3 80


 


Weight  169 lb 


 


Intake:   


 


  Intake, IV Amount 1315 1086.3 125


 


    Right Distal Port 100  





    Internal Jugular   


 


    Right Proximal Port 975 925 125


 


    Right Medial Port 240 161.3 





    Internal Jugular   


 


  Other 80  


 


Output:   


 


  Gastric Amount 60  


 


    Stomach 60  


 


  Urine 480 375 45


 


    Urethral (Gonzalez) 480 375 45


 


  Stool 0  














- Physical Exam


Physical Exam Limitations: Positive for: Altered Mental Status


Head: Positive for: Atraumatic, Normocephalic


Pupils: Positive for: PERRL


Extroacular Muscles: Positive for: EOMI


Conjunctiva: Positive for: Normal


Ears: Positive for: Normal


Mouth: Positive for: Moist Mucous Membranes


Respiratory/Chest: Positive for: Clear to Auscultation.  Negative for: Wheezes


Cardiovascular: Positive for: Normal S1, S2


Abdomen: Positive for: Distention, Normal Bowel Sounds.  Negative for: 

Peritoneal Signs


Upper Extremity: Positive for: NORMAL PULSES


Lower Extremity: Negative for: Edema


Skin: Positive for: Warm, Dry


Psychiatric: Positive for: Other (sedated at this time).  Negative for: Alert





- Medications


Active Medications: 


Active Medications











Generic Name Dose Route Start Last Admin





  Trade Name Freq  PRN Reason Stop Dose Admin


 


Acetaminophen  650 mg 03/15/17 04:18  





  Tylenol 325mg Tab  PO   





  Q4 PRN   





  Fever >100.4 F   


 


Famotidine  20 mg 03/15/17 10:00 03/15/17 21:57





  Pepcid  IVP   20 mg





  Q12 ARIELLE   Administration


 


Heparin Sodium (Porcine)  5,000 units 03/15/17 06:00 17 06:00





  Heparin  SC   5,000 units





  Q8 ARIELLE   Administration


 


Metronidazole  100 mls @ 100 mls/hr 03/15/17 06:45 17 06:00





  Flagyl  IVPB   100 mls/hr





  Q8 ARIELLE   Administration


 


Norepinephrine Bitartrate 4 mg  254 mls @ 15.24 mls/hr 03/15/17 09:08 17 

03:20





  / Sodium Chloride  IV   22.86 mls/hr





  .[id number] PRN   Administration





  TITRATE PER MD ORDER   





  Protocol   





  4 MCG/MIN   


 


Imipenem/Cilastatin Sodium 250  100 mls @ 100 mls/hr 03/15/17 11:30 17 05:

30





  mg/ Sodium Chloride  IVPB   100 mls/hr





  Q6H ARIELLE   Administration


 


Sodium Chloride  1,000 mls @ 125 mls/hr 03/15/17 13:06 17 06:40





  Sodium Chloride 0.9%  IV   125 mls/hr





  .Q8H ARIELLE   Administration


 


Vancomycin HCl  500 mg 03/15/17 10:00 03/15/17 21:59





  Vancocin (Oral Or Rectal Use)  PO   500 mg





  QID ARIELLE   Administration


 


Vancomycin HCl  500 mg 17 10:00  





  Vancocin (Oral Or Rectal Use)  OH   





  TID ARIELLE   














- Patient Studies


Lab Studies: 


 Lab Studies











  03/16/17 03/16/17 03/15/17 Range/Units





  06:34 05:16 13:55 


 


WBC  50.3 H*   59.0 H*  (4.8-10.8)  K/uL


 


RBC  4.17   4.43  (3.80-5.20)  Mil/uL


 


Hgb  11.6   11.8  (11.0-16.0)  g/dL


 


Hct  34.4   36.7  (34.0-47.0)  %


 


MCV  82.5   82.8  (81.0-99.0)  fL


 


MCH  27.9   26.7 L  (27.0-31.0)  pg


 


MCHC  33.8   32.3 L  (33.0-37.0)  g/dL


 


RDW  14.9 H   14.9 H  (11.5-14.5)  %


 


Plt Count  407 H   369  (130-400)  K/uL


 


MPV  8.0   8.0  (7.2-11.7)  fL


 


Neut % (Auto)  85.6 H   93.8 H  (50.0-75.0)  %


 


Lymph % (Auto)  1.8 L   2.0 L  (20.0-40.0)  %


 


Mono % (Auto)  12.5 H   3.8  (0.0-10.0)  %


 


Eos % (Auto)  0.1   0.0  (0.0-4.0)  %


 


Baso % (Auto)  0.0   0.4  (0.0-2.0)  %


 


Neut #  43.0 H   55.4 H  (1.8-7.0)  K/uL


 


Lymph #  0.9 L   1.2  (1.0-4.3)  K/uL


 


Mono #  6.3 H   2.2 H  (0.0-0.8)  K/uL


 


Eos #  0.0   0.0  (0.0-0.7)  K/uL


 


Baso #  0.0   0.2  (0.0-0.2)  K/uL


 


Neutrophils % (Manual)    40 L  (50-75)  %


 


Band Neutrophils %    49 H*  (0-2)  %


 


Lymphocytes % (Manual)    1 L  (20-40)  %


 


Monocytes % (Manual)    6  (0-10)  %


 


Metamyelocytes %    2 H  (0-0)  %


 


Myelocytes %    2 H  (0-0)  %


 


Toxic Granulation    Present  


 


Dohle Bodies    Present  


 


Jos Rods      


 


Platelet Estimate    Normal  (NORMAL)  


 


Large Platelets    Present  


 


Poikilocytosis (manual    Slight  


 


Sabattus Cells    Slight  


 


Smear Path Review     


 


Puncture Site   Rb   


 


pCO2   25 L   (35-45)  mm/Hg


 


pO2   142 H   ()  mm/Hg


 


HCO3   17.7 L   (21-28)  mmol/L


 


ABG pH   7.37   (7.35-7.45)  


 


ABG Total CO2   15.3 L   (22-28)  mmol/L


 


ABG O2 Saturation   99.4 H   (95-98)  %


 


ABG Base Excess   -9.2 L   (-2.0-3.0)  mmol/L


 


ABG Hemoglobin   11.6 L   (11.7-17.4)  g/dL


 


ABG Carboxyhemoglobin   1.3   (0.5-1.5)  %


 


POC ABG HHb (Measured)   0.6   (0.0-5.0)  %


 


ABG Methemoglobin   1.9   (0.0-3.0)  %


 


Martin Test   Na   


 


A-a O2 Difference   183.0   mm/Hg


 


Respiratory Index   1.3   


 


Hgb O2 Saturation   96.2   (95.0-98.0)  %


 


Mechanical Rate   14   


 


FiO2   50.0   %


 


Tidal Volume   500   


 


PEEP   5   


 


Sodium  126 L   122 L  (132-148)  mmol/L


 


Potassium  3.9   4.0  (3.6-5.2)  mmol/L


 


Chloride  95 L   92 L  ()  mmol/L


 


Carbon Dioxide  18 L   18 L  (22-30)  mmol/L


 


Anion Gap  17   16  (10-20)  


 


BUN  20 H   21 H  (7-17)  mg/dL


 


Creatinine  0.8   0.9  (0.7-1.2)  MG/DL


 


Est GFR (African Amer)  > 60   > 60  


 


Est GFR (Non-Af Amer)  > 60   > 60  


 


Random Glucose  98   147 H  ()  mg/dL


 


Serum Osmolality     (272-300)  mosm/kg


 


Calcium  6.4 L   6.3 L  (8.6-10.4)  mg/dl


 


Phosphorus  3.1    (2.5-4.5)  mg/dL


 


Magnesium  2.1   2.1  (1.6-2.3)  mg/dL


 


Total Bilirubin  0.3   0.3  (0.2-1.3)  mg/dL


 


AST  30   34  (14-36)  U/L


 


ALT  24   28  (9-52)  U/L


 


Alkaline Phosphatase  82   64  ()  U/L


 


Total Protein  5.1 L   5.2 L  (6.3-8.3)  g/dL


 


Albumin  2.0 L   2.2 L  (3.5-5.0)  g/dL


 


Globulin  3.0   3.0  (2.2-3.9)  gm/dL


 


Albumin/Globulin Ratio  0.7 L   0.7 L  (1.0-2.1)  


 


Urine Osmolality     (300-1000)  mosm/kg


 


Ur Random Sodium     mmol/L


 


Urine Chloride     ()  mmol/L














  03/15/17 03/15/17 03/15/17 Range/Units





  10:43 07:57 06:08 


 


WBC     (4.8-10.8)  K/uL


 


RBC     (3.80-5.20)  Mil/uL


 


Hgb     (11.0-16.0)  g/dL


 


Hct     (34.0-47.0)  %


 


MCV     (81.0-99.0)  fL


 


MCH     (27.0-31.0)  pg


 


MCHC     (33.0-37.0)  g/dL


 


RDW     (11.5-14.5)  %


 


Plt Count     (130-400)  K/uL


 


MPV     (7.2-11.7)  fL


 


Neut % (Auto)     (50.0-75.0)  %


 


Lymph % (Auto)     (20.0-40.0)  %


 


Mono % (Auto)     (0.0-10.0)  %


 


Eos % (Auto)     (0.0-4.0)  %


 


Baso % (Auto)     (0.0-2.0)  %


 


Neut #     (1.8-7.0)  K/uL


 


Lymph #     (1.0-4.3)  K/uL


 


Mono #     (0.0-0.8)  K/uL


 


Eos #     (0.0-0.7)  K/uL


 


Baso #     (0.0-0.2)  K/uL


 


Neutrophils % (Manual)     (50-75)  %


 


Band Neutrophils %     (0-2)  %


 


Lymphocytes % (Manual)     (20-40)  %


 


Monocytes % (Manual)     (0-10)  %


 


Metamyelocytes %     (0-0)  %


 


Myelocytes %     (0-0)  %


 


Toxic Granulation     


 


Dohle Bodies     


 


Jos Rods     


 


Platelet Estimate     (NORMAL)  


 


Large Platelets     


 


Poikilocytosis (manual     


 


Sabattus Cells     


 


Smear Path Review      


 


Puncture Site     


 


pCO2     (35-45)  mm/Hg


 


pO2     ()  mm/Hg


 


HCO3     (21-28)  mmol/L


 


ABG pH     (7.35-7.45)  


 


ABG Total CO2     (22-28)  mmol/L


 


ABG O2 Saturation     (95-98)  %


 


ABG Base Excess     (-2.0-3.0)  mmol/L


 


ABG Hemoglobin     (11.7-17.4)  g/dL


 


ABG Carboxyhemoglobin     (0.5-1.5)  %


 


POC ABG HHb (Measured)     (0.0-5.0)  %


 


ABG Methemoglobin     (0.0-3.0)  %


 


Martin Test     


 


A-a O2 Difference     mm/Hg


 


Respiratory Index     


 


Hgb O2 Saturation     (95.0-98.0)  %


 


Mechanical Rate     


 


FiO2     %


 


Tidal Volume     


 


PEEP     


 


Sodium     (132-148)  mmol/L


 


Potassium     (3.6-5.2)  mmol/L


 


Chloride     ()  mmol/L


 


Carbon Dioxide     (22-30)  mmol/L


 


Anion Gap     (10-20)  


 


BUN     (7-17)  mg/dL


 


Creatinine     (0.7-1.2)  MG/DL


 


Est GFR (African Amer)     


 


Est GFR (Non-Af Amer)     


 


Random Glucose     ()  mg/dL


 


Serum Osmolality  261 L    (272-300)  mosm/kg


 


Calcium     (8.6-10.4)  mg/dl


 


Phosphorus     (2.5-4.5)  mg/dL


 


Magnesium     (1.6-2.3)  mg/dL


 


Total Bilirubin     (0.2-1.3)  mg/dL


 


AST     (14-36)  U/L


 


ALT     (9-52)  U/L


 


Alkaline Phosphatase     ()  U/L


 


Total Protein     (6.3-8.3)  g/dL


 


Albumin     (3.5-5.0)  g/dL


 


Globulin     (2.2-3.9)  gm/dL


 


Albumin/Globulin Ratio     (1.0-2.1)  


 


Urine Osmolality   275 L   (300-1000)  mosm/kg


 


Ur Random Sodium   7   mmol/L


 


Urine Chloride   <15 L   ()  mmol/L








 Laboratory Results - last 24 hr











  03/15/17 03/15/17 03/15/17





  06:08 07:57 10:43


 


WBC   


 


RBC   


 


Hgb   


 


Hct   


 


MCV   


 


MCH   


 


MCHC   


 


RDW   


 


Plt Count   


 


MPV   


 


Neut % (Auto)   


 


Lymph % (Auto)   


 


Mono % (Auto)   


 


Eos % (Auto)   


 


Baso % (Auto)   


 


Neut #   


 


Lymph #   


 


Mono #   


 


Eos #   


 


Baso #   


 


Neutrophils % (Manual)   


 


Band Neutrophils %   


 


Lymphocytes % (Manual)   


 


Monocytes % (Manual)   


 


Metamyelocytes %   


 


Myelocytes %   


 


Toxic Granulation   


 


Dohle Bodies   


 


Jos Rods   


 


Platelet Estimate   


 


Large Platelets   


 


Poikilocytosis (manual   


 


Sabattus Cells   


 


Smear Path Review    


 


Puncture Site   


 


pCO2   


 


pO2   


 


HCO3   


 


ABG pH   


 


ABG Total CO2   


 


ABG O2 Saturation   


 


ABG Base Excess   


 


ABG Hemoglobin   


 


ABG Carboxyhemoglobin   


 


POC ABG HHb (Measured)   


 


ABG Methemoglobin   


 


Martin Test   


 


A-a O2 Difference   


 


Respiratory Index   


 


Hgb O2 Saturation   


 


Mechanical Rate   


 


FiO2   


 


Tidal Volume   


 


PEEP   


 


Sodium   


 


Potassium   


 


Chloride   


 


Carbon Dioxide   


 


Anion Gap   


 


BUN   


 


Creatinine   


 


Est GFR ( Amer)   


 


Est GFR (Non-Af Amer)   


 


Random Glucose   


 


Serum Osmolality    261 L


 


Calcium   


 


Phosphorus   


 


Magnesium   


 


Total Bilirubin   


 


AST   


 


ALT   


 


Alkaline Phosphatase   


 


Total Protein   


 


Albumin   


 


Globulin   


 


Albumin/Globulin Ratio   


 


Urine Osmolality   275 L 


 


Ur Random Sodium   7 


 


Urine Chloride   <15 L 














  03/15/17 03/16/17 03/16/17





  13:55 05:16 06:34


 


WBC  59.0 H*   50.3 H*


 


RBC  4.43   4.17


 


Hgb  11.8   11.6


 


Hct  36.7   34.4


 


MCV  82.8   82.5


 


MCH  26.7 L   27.9


 


MCHC  32.3 L   33.8


 


RDW  14.9 H   14.9 H


 


Plt Count  369   407 H


 


MPV  8.0   8.0


 


Neut % (Auto)  93.8 H   85.6 H


 


Lymph % (Auto)  2.0 L   1.8 L


 


Mono % (Auto)  3.8   12.5 H


 


Eos % (Auto)  0.0   0.1


 


Baso % (Auto)  0.4   0.0


 


Neut #  55.4 H   43.0 H


 


Lymph #  1.2   0.9 L


 


Mono #  2.2 H   6.3 H


 


Eos #  0.0   0.0


 


Baso #  0.2   0.0


 


Neutrophils % (Manual)  40 L  


 


Band Neutrophils %  49 H*  


 


Lymphocytes % (Manual)  1 L  


 


Monocytes % (Manual)  6  


 


Metamyelocytes %  2 H  


 


Myelocytes %  2 H  


 


Toxic Granulation  Present  


 


Dohle Bodies  Present  


 


Jos Rods    


 


Platelet Estimate  Normal  


 


Large Platelets  Present  


 


Poikilocytosis (manual  Slight  


 


Sabattus Cells  Slight  


 


Smear Path Review   


 


Puncture Site   Rb 


 


pCO2   25 L 


 


pO2   142 H 


 


HCO3   17.7 L 


 


ABG pH   7.37 


 


ABG Total CO2   15.3 L 


 


ABG O2 Saturation   99.4 H 


 


ABG Base Excess   -9.2 L 


 


ABG Hemoglobin   11.6 L 


 


ABG Carboxyhemoglobin   1.3 


 


POC ABG HHb (Measured)   0.6 


 


ABG Methemoglobin   1.9 


 


Martin Test   Na 


 


A-a O2 Difference   183.0 


 


Respiratory Index   1.3 


 


Hgb O2 Saturation   96.2 


 


Mechanical Rate   14 


 


FiO2   50.0 


 


Tidal Volume   500 


 


PEEP   5 


 


Sodium  122 L   126 L


 


Potassium  4.0   3.9


 


Chloride  92 L   95 L


 


Carbon Dioxide  18 L   18 L


 


Anion Gap  16   17


 


BUN  21 H   20 H


 


Creatinine  0.9   0.8


 


Est GFR ( Amer)  > 60   > 60


 


Est GFR (Non-Af Amer)  > 60   > 60


 


Random Glucose  147 H   98


 


Serum Osmolality   


 


Calcium  6.3 L   6.4 L


 


Phosphorus    3.1


 


Magnesium  2.1   2.1


 


Total Bilirubin  0.3   0.3


 


AST  34   30


 


ALT  28   24


 


Alkaline Phosphatase  64   82


 


Total Protein  5.2 L   5.1 L


 


Albumin  2.2 L   2.0 L


 


Globulin  3.0   3.0


 


Albumin/Globulin Ratio  0.7 L   0.7 L


 


Urine Osmolality   


 


Ur Random Sodium   


 


Urine Chloride   














Review of Systems





- Review of Systems


Review of Systems: 


as noted in subjective





Assessment/Plan





- Assessment and Plan (Free Text)


Assessment: 


77 year old female with a past medical history of HTN, arthritis, anxiety, 

seizures, back problems and gall bladder disease presents with clinical 

findings positive for c. diff colitis.


Plan: 


Neuro:


Neuro check q4


03/15 CT Head: no definite acute intracranial abnormality .





Cardio:


Maintain systolic BP >110


Levophed IV 4mg/NS


Imaging:


3/16 CXR: IJ in place; no active disease otherwise noted


3/15 CXR: No active disease


3/15 EKG: NSR @ 99 bpm


3/15 CXR 08:50: distal tip of vascular catheter overlying the projection of the 

expected location of the SVC


3/14 CXR 00:53: Developing hazy opacity overlying right upper lobe.


Norepinephrine 254 cc @ 15.24 cc/hr IV [id number] PRN





Pulm:


Maintain O2 Sat >92%


Vent Settings: FiO2 50% RR 14 PEEP 5 , Peak 24


ABG:


-03/15 pH 7.35, CO2 29, O2 312, HCO3 18.6, base excess 8.2


Imagin/15 CXR: correct placement of ET tube.





Endo:


Maintain euglycemia





GI:


C. diff colitis


Monitor BM


03/15 CT Abdomen/Pelvis: Colitis, indeterminate splenic lesion


PPN started


Dr. Alex recommendations for Surgical eval for fecal transplant- will relay 

to primary team





:


I/Os 4633.1295 = 3338.8cc


Monitor I/Os


Gonzalez is in place


Maintain Gonzalez care





Renal:


BUN/Cr: 20/0.8


UOsm: 275


Monitor I/Os


Daily BMP


Hypomagnesemia - repleted Hypokalemia  repleted


NS 1000 cc @ 125cc/hr Q5H


Urine Cx: no growth





Heme:


H&H- 11.6/34.4


Monitor CBC





ID:


C diff colitis (Positive studies)


Low grade fever


WBC: 50.3 (36.3 --> 50.1 --> 59.0 --> 50.3)


Neutrophils: (31-->40-->41)


Bands: (55-->49-->50)


3/16: C-diff toxin Positive


Daily CBC


Acetaminophen 650 mg PO Q4 PRN Fever >100.4 F


Primaxin Q6 renally dosed


Flagyl 100 cc @ 100 cc/hr IVPB Q8 ARIELLE


Vancomycin 500 mg PO QID





Prophylaxis:


GI: Pepcid 20 mg IVP Q12


DVT: Heparin SC, SCDs








<Latef,Ketan M - Last Filed: 17 18:31>





CCU Objective





- Vital Signs / Intake & Output


Vital Signs (Last 4 hours): 


Vital Signs











  Pulse Resp BP Pulse Ox


 


 17 17:34  98 H  23  104/39 L  99











Intake and Output (Last 8hrs): 


 Intake & Output











 17





 06:59 14:59 22:59


 


Intake Total 1086.3 420.0 


 


Output Total 375 115 


 


Balance 711.3 305.0 


 


Weight 169 lb  


 


Intake:   


 


  Intake, IV Amount 1086.3 420.0 


 


    Right Proximal Port 925 375 


 


    Right Medial Port 161.3 45.0 





    Internal Jugular   


 


Output:   


 


  Urine 375 115 


 


    Urethral (Gonzalez) 375 115 


 


Other:   


 


  # Bowel Movements  1 














- Medications


Active Medications: 


Active Medications











Generic Name Dose Route Start Last Admin





  Trade Name Freq  PRN Reason Stop Dose Admin


 


Acetaminophen  650 mg 03/15/17 04:18 17 09:14





  Tylenol 325mg Tab  PO   650 mg





  Q4 PRN   Administration





  Fever >100.4 F   


 


Famotidine  20 mg 03/15/17 10:00 17 10:23





  Pepcid  IVP   20 mg





  Q12 ARIELLE   Administration


 


Heparin Sodium (Porcine)  5,000 units 03/15/17 06:00 17 14:36





  Heparin  SC   5,000 units





  Q8 ARIELLE   Administration


 


Metronidazole  100 mls @ 100 mls/hr 03/15/17 06:45 17 14:34





  Flagyl  IVPB   100 mls/hr





  Q8 ARIELLE   Administration


 


Norepinephrine Bitartrate 4 mg  254 mls @ 15.24 mls/hr 03/15/17 09:08 17 

17:34





  / Sodium Chloride  IV   30.48 mls/hr





  .[id number] PRN   Administration





  TITRATE PER MD ORDER   





  Protocol   





  4 MCG/MIN   


 


Imipenem/Cilastatin Sodium 250  100 mls @ 100 mls/hr 03/15/17 11:30 17 17:

32





  mg/ Sodium Chloride  IVPB   100 mls/hr





  Q6H ARIELLE   Administration


 


Sodium Chloride  1,000 mls @ 125 mls/hr 03/15/17 13:06 17 14:33





  Sodium Chloride 0.9%  IV   Not Given





  .Q8H ARIELLE   


 


Multivitamins/Vitamin C 10 ml/  1,010 mls @ 42 mls/hr 17 18:00 17 17

:44





  Amino Acids  IV 17 17:59  42 mls/hr





  .Q24H ONE   Administration


 


Fat Emulsion Intravenous  250 mls @ 42 mls/hr 17 18:00 17 17:41





  Intralipid 20%  IV 17 23:58  42 mls/hr





  [id number]@1800 ARIELLE   Administration


 


Vancomycin HCl  500 mg 03/15/17 10:00 17 17:37





  Vancocin (Oral Or Rectal Use)  PO   500 mg





  QID ARIELLE   Administration


 


Vancomycin HCl  500 mg 17 10:00 17 17:39





  Vancocin (Oral Or Rectal Use)  OH   500 mg





  TID ARIELLE   Administration














- Patient Studies


Lab Studies: 


 Lab Studies











  03/16/17 03/16/17 03/15/17 Range/Units





  06:34 05:16 10:15 


 


WBC  50.3 H*    (4.8-10.8)  K/uL


 


RBC  4.17    (3.80-5.20)  Mil/uL


 


Hgb  11.6    (11.0-16.0)  g/dL


 


Hct  34.4    (34.0-47.0)  %


 


MCV  82.5    (81.0-99.0)  fL


 


MCH  27.9    (27.0-31.0)  pg


 


MCHC  33.8    (33.0-37.0)  g/dL


 


RDW  14.9 H    (11.5-14.5)  %


 


Plt Count  407 H    (130-400)  K/uL


 


MPV  8.0    (7.2-11.7)  fL


 


Neut % (Auto)  85.6 H    (50.0-75.0)  %


 


Lymph % (Auto)  1.8 L    (20.0-40.0)  %


 


Mono % (Auto)  12.5 H    (0.0-10.0)  %


 


Eos % (Auto)  0.1    (0.0-4.0)  %


 


Baso % (Auto)  0.0    (0.0-2.0)  %


 


Neut #  43.0 H    (1.8-7.0)  K/uL


 


Lymph #  0.9 L    (1.0-4.3)  K/uL


 


Mono #  6.3 H    (0.0-0.8)  K/uL


 


Eos #  0.0    (0.0-0.7)  K/uL


 


Baso #  0.0    (0.0-0.2)  K/uL


 


Neutrophils % (Manual)  41 L    (50-75)  %


 


Band Neutrophils %  50 H*    (0-2)  %


 


Lymphocytes % (Manual)  1 L    (20-40)  %


 


Monocytes % (Manual)  6    (0-10)  %


 


Metamyelocytes %  1 H    (0-0)  %


 


Myelocytes %  1 H    (0-0)  %


 


Platelet Estimate  Normal    (NORMAL)  


 


Large Platelets  Present    


 


Polychromasia  Slight    


 


Poikilocytosis (manual  Slight    


 


Sabattus Cells  Slight    


 


Puncture Site   Rb   


 


pCO2   25 L   (35-45)  mm/Hg


 


pO2   142 H   ()  mm/Hg


 


HCO3   17.7 L   (21-28)  mmol/L


 


ABG pH   7.37   (7.35-7.45)  


 


ABG Total CO2   15.3 L   (22-28)  mmol/L


 


ABG O2 Saturation   99.4 H   (95-98)  %


 


ABG Base Excess   -9.2 L   (-2.0-3.0)  mmol/L


 


ABG Hemoglobin   11.6 L   (11.7-17.4)  g/dL


 


ABG Carboxyhemoglobin   1.3   (0.5-1.5)  %


 


POC ABG HHb (Measured)   0.6   (0.0-5.0)  %


 


ABG Methemoglobin   1.9   (0.0-3.0)  %


 


Martin Test   Na   


 


A-a O2 Difference   183.0   mm/Hg


 


Respiratory Index   1.3   


 


Hgb O2 Saturation   96.2   (95.0-98.0)  %


 


Mechanical Rate   14   


 


FiO2   50.0   %


 


Tidal Volume   500   


 


PEEP   5   


 


Sodium  126 L    (132-148)  mmol/L


 


Potassium  3.9    (3.6-5.2)  mmol/L


 


Chloride  95 L    ()  mmol/L


 


Carbon Dioxide  18 L    (22-30)  mmol/L


 


Anion Gap  17    (10-20)  


 


BUN  20 H    (7-17)  mg/dL


 


Creatinine  0.8    (0.7-1.2)  MG/DL


 


Est GFR (African Amer)  > 60    


 


Est GFR (Non-Af Amer)  > 60    


 


Random Glucose  98    ()  mg/dL


 


Calcium  6.4 L    (8.6-10.4)  mg/dl


 


Phosphorus  3.1    (2.5-4.5)  mg/dL


 


Magnesium  2.1    (1.6-2.3)  mg/dL


 


Total Bilirubin  0.3    (0.2-1.3)  mg/dL


 


AST  30    (14-36)  U/L


 


ALT  24    (9-52)  U/L


 


Alkaline Phosphatase  82    ()  U/L


 


Total Protein  5.1 L    (6.3-8.3)  g/dL


 


Albumin  2.0 L    (3.5-5.0)  g/dL


 


Globulin  3.0    (2.2-3.9)  gm/dL


 


Albumin/Globulin Ratio  0.7 L    (1.0-2.1)  


 


Urine Chloride     ()  mmol/L


 


C. difficile Ag & Toxin    Positive H  (NEGATIVE)  














  03/15/17 Range/Units





  07:57 


 


WBC   (4.8-10.8)  K/uL


 


RBC   (3.80-5.20)  Mil/uL


 


Hgb   (11.0-16.0)  g/dL


 


Hct   (34.0-47.0)  %


 


MCV   (81.0-99.0)  fL


 


MCH   (27.0-31.0)  pg


 


MCHC   (33.0-37.0)  g/dL


 


RDW   (11.5-14.5)  %


 


Plt Count   (130-400)  K/uL


 


MPV   (7.2-11.7)  fL


 


Neut % (Auto)   (50.0-75.0)  %


 


Lymph % (Auto)   (20.0-40.0)  %


 


Mono % (Auto)   (0.0-10.0)  %


 


Eos % (Auto)   (0.0-4.0)  %


 


Baso % (Auto)   (0.0-2.0)  %


 


Neut #   (1.8-7.0)  K/uL


 


Lymph #   (1.0-4.3)  K/uL


 


Mono #   (0.0-0.8)  K/uL


 


Eos #   (0.0-0.7)  K/uL


 


Baso #   (0.0-0.2)  K/uL


 


Neutrophils % (Manual)   (50-75)  %


 


Band Neutrophils %   (0-2)  %


 


Lymphocytes % (Manual)   (20-40)  %


 


Monocytes % (Manual)   (0-10)  %


 


Metamyelocytes %   (0-0)  %


 


Myelocytes %   (0-0)  %


 


Platelet Estimate   (NORMAL)  


 


Large Platelets   


 


Polychromasia   


 


Poikilocytosis (manual   


 


Sabattus Cells   


 


Puncture Site   


 


pCO2   (35-45)  mm/Hg


 


pO2   ()  mm/Hg


 


HCO3   (21-28)  mmol/L


 


ABG pH   (7.35-7.45)  


 


ABG Total CO2   (22-28)  mmol/L


 


ABG O2 Saturation   (95-98)  %


 


ABG Base Excess   (-2.0-3.0)  mmol/L


 


ABG Hemoglobin   (11.7-17.4)  g/dL


 


ABG Carboxyhemoglobin   (0.5-1.5)  %


 


POC ABG HHb (Measured)   (0.0-5.0)  %


 


ABG Methemoglobin   (0.0-3.0)  %


 


Martin Test   


 


A-a O2 Difference   mm/Hg


 


Respiratory Index   


 


Hgb O2 Saturation   (95.0-98.0)  %


 


Mechanical Rate   


 


FiO2   %


 


Tidal Volume   


 


PEEP   


 


Sodium   (132-148)  mmol/L


 


Potassium   (3.6-5.2)  mmol/L


 


Chloride   ()  mmol/L


 


Carbon Dioxide   (22-30)  mmol/L


 


Anion Gap   (10-20)  


 


BUN   (7-17)  mg/dL


 


Creatinine   (0.7-1.2)  MG/DL


 


Est GFR (African Amer)   


 


Est GFR (Non-Af Amer)   


 


Random Glucose   ()  mg/dL


 


Calcium   (8.6-10.4)  mg/dl


 


Phosphorus   (2.5-4.5)  mg/dL


 


Magnesium   (1.6-2.3)  mg/dL


 


Total Bilirubin   (0.2-1.3)  mg/dL


 


AST   (14-36)  U/L


 


ALT   (9-52)  U/L


 


Alkaline Phosphatase   ()  U/L


 


Total Protein   (6.3-8.3)  g/dL


 


Albumin   (3.5-5.0)  g/dL


 


Globulin   (2.2-3.9)  gm/dL


 


Albumin/Globulin Ratio   (1.0-2.1)  


 


Urine Chloride  <15 L  ()  mmol/L


 


C. difficile Ag & Toxin   (NEGATIVE)  








 Laboratory Results - last 24 hr











  03/15/17 03/15/17 03/16/17





  07:57 10:15 05:16


 


WBC   


 


RBC   


 


Hgb   


 


Hct   


 


MCV   


 


MCH   


 


MCHC   


 


RDW   


 


Plt Count   


 


MPV   


 


Neut % (Auto)   


 


Lymph % (Auto)   


 


Mono % (Auto)   


 


Eos % (Auto)   


 


Baso % (Auto)   


 


Neut #   


 


Lymph #   


 


Mono #   


 


Eos #   


 


Baso #   


 


Neutrophils % (Manual)   


 


Band Neutrophils %   


 


Lymphocytes % (Manual)   


 


Monocytes % (Manual)   


 


Metamyelocytes %   


 


Myelocytes %   


 


Platelet Estimate   


 


Large Platelets   


 


Polychromasia   


 


Poikilocytosis (manual   


 


Sabattus Cells   


 


Puncture Site    Rb


 


pCO2    25 L


 


pO2    142 H


 


HCO3    17.7 L


 


ABG pH    7.37


 


ABG Total CO2    15.3 L


 


ABG O2 Saturation    99.4 H


 


ABG Base Excess    -9.2 L


 


ABG Hemoglobin    11.6 L


 


ABG Carboxyhemoglobin    1.3


 


POC ABG HHb (Measured)    0.6


 


ABG Methemoglobin    1.9


 


Martin Test    Na


 


A-a O2 Difference    183.0


 


Respiratory Index    1.3


 


Hgb O2 Saturation    96.2


 


Mechanical Rate    14


 


FiO2    50.0


 


Tidal Volume    500


 


PEEP    5


 


Sodium   


 


Potassium   


 


Chloride   


 


Carbon Dioxide   


 


Anion Gap   


 


BUN   


 


Creatinine   


 


Est GFR ( Amer)   


 


Est GFR (Non-Af Amer)   


 


Random Glucose   


 


Calcium   


 


Phosphorus   


 


Magnesium   


 


Total Bilirubin   


 


AST   


 


ALT   


 


Alkaline Phosphatase   


 


Total Protein   


 


Albumin   


 


Globulin   


 


Albumin/Globulin Ratio   


 


Urine Chloride  <15 L  


 


C. difficile Ag & Toxin   Positive H 














  17





  06:34


 


WBC  50.3 H*


 


RBC  4.17


 


Hgb  11.6


 


Hct  34.4


 


MCV  82.5


 


MCH  27.9


 


MCHC  33.8


 


RDW  14.9 H


 


Plt Count  407 H


 


MPV  8.0


 


Neut % (Auto)  85.6 H


 


Lymph % (Auto)  1.8 L


 


Mono % (Auto)  12.5 H


 


Eos % (Auto)  0.1


 


Baso % (Auto)  0.0


 


Neut #  43.0 H


 


Lymph #  0.9 L


 


Mono #  6.3 H


 


Eos #  0.0


 


Baso #  0.0


 


Neutrophils % (Manual)  41 L


 


Band Neutrophils %  50 H*


 


Lymphocytes % (Manual)  1 L


 


Monocytes % (Manual)  6


 


Metamyelocytes %  1 H


 


Myelocytes %  1 H


 


Platelet Estimate  Normal


 


Large Platelets  Present


 


Polychromasia  Slight


 


Poikilocytosis (manual  Slight


 


Sabattus Cells  Slight


 


Puncture Site 


 


pCO2 


 


pO2 


 


HCO3 


 


ABG pH 


 


ABG Total CO2 


 


ABG O2 Saturation 


 


ABG Base Excess 


 


ABG Hemoglobin 


 


ABG Carboxyhemoglobin 


 


POC ABG HHb (Measured) 


 


ABG Methemoglobin 


 


Martin Test 


 


A-a O2 Difference 


 


Respiratory Index 


 


Hgb O2 Saturation 


 


Mechanical Rate 


 


FiO2 


 


Tidal Volume 


 


PEEP 


 


Sodium  126 L


 


Potassium  3.9


 


Chloride  95 L


 


Carbon Dioxide  18 L


 


Anion Gap  17


 


BUN  20 H


 


Creatinine  0.8


 


Est GFR ( Amer)  > 60


 


Est GFR (Non-Af Amer)  > 60


 


Random Glucose  98


 


Calcium  6.4 L


 


Phosphorus  3.1


 


Magnesium  2.1


 


Total Bilirubin  0.3


 


AST  30


 


ALT  24


 


Alkaline Phosphatase  82


 


Total Protein  5.1 L


 


Albumin  2.0 L


 


Globulin  3.0


 


Albumin/Globulin Ratio  0.7 L


 


Urine Chloride 


 


C. difficile Ag & Toxin 














Attending/Attestation





- Attestation


I have personally seen and examined this patient.: Yes


I have fully participated in the care of the patient.: Yes


I have reviewed all pertinent clinical information: Yes


Notes (Text): 





17 


Today: 





The Patient was seen and examined at the bedside, Medical records reviewed, all 

clinical/lab/hemodynamic/radiographic data were reviewed and management issues 

were discussed and formulated,


Events reviewed


Pain issues, skin care, head of the bed elevation, GI/DVT prophylaxis, glycemic 

control were addressed.


Agree with above treatment plans as transcribed in Dr. Spencer note

## 2017-03-16 NOTE — CP.PCM.PN
Subjective





- Date & Time of Evaluation


Date of Evaluation: 03/16/17


Time of Evaluation: 10:17





- Subjective


Subjective: 


F/U colitis.


PResent: family and nurse and Aid.


Today had a small loose BM.  Sent for c difficile.


No chills, rigors, Rb, melena, hematemesisi, hemoptysis, HA,.  Has sl cough.





Objective





- Vital Signs/Intake and Output


Vital Signs (last 24 hours): 


 











Temp Pulse Resp BP Pulse Ox


 


 100 F H  106 H  26 H  133/48 L  99 


 


 03/16/17 08:36  03/16/17 08:02  03/16/17 08:02  03/16/17 08:02  03/16/17 08:02








Intake and Output: 


 











 03/16/17 03/16/17





 06:59 18:59


 


Intake Total 1681.3 420.0


 


Output Total 615 115


 


Balance 1066.3 305.0














- Medications


Medications: 


 Current Medications





Acetaminophen (Tylenol 325mg Tab)  650 mg PO Q4 PRN


   PRN Reason: Fever >100.4 F


   Last Admin: 03/16/17 09:14 Dose:  650 mg


Famotidine (Pepcid)  20 mg IVP Q12 [facility]


   Last Admin: 03/15/17 21:57 Dose:  20 mg


Heparin Sodium (Porcine) (Heparin)  5,000 units SC Q8 [facility]


   Last Admin: 03/16/17 06:00 Dose:  5,000 units


Metronidazole (Flagyl)  100 mls @ 100 mls/hr IVPB Q8 [facility]


   Last Admin: 03/16/17 06:00 Dose:  100 mls/hr


Norepinephrine Bitartrate 4 mg (/ Sodium Chloride)  254 mls @ 15.24 mls/hr IV 

.[id number] PRN; Protocol; 4 MCG/MIN


   PRN Reason: TITRATE PER MD ORDER


   Last Admin: 03/16/17 03:20 Dose:  22.86 mls/hr


Imipenem/Cilastatin Sodium 250 (mg/ Sodium Chloride)  100 mls @ 100 mls/hr IVPB 

Q6H [facility]


   Last Admin: 03/16/17 05:30 Dose:  100 mls/hr


Sodium Chloride (Sodium Chloride 0.9%)  1,000 mls @ 125 mls/hr IV .Q8H [facility]


   Last Admin: 03/16/17 06:40 Dose:  125 mls/hr


Vancomycin HCl (Vancocin (Oral Or Rectal Use))  500 mg PO QID [facility]


   Last Admin: 03/16/17 09:15 Dose:  500 mg


Vancomycin HCl (Vancocin (Oral Or Rectal Use))  500 mg MI TID [facility]











- Labs


Labs: 


 





 03/16/17 06:34 





 03/16/17 06:34 





 











PT  14.8 SECONDS (9.7-12.2)  H  03/15/17  01:01    


 


INR  1.3   03/15/17  01:01    


 


APTT  37 SECONDS (21-34)  H  03/15/17  01:01    














- Respiratory Exam


Respiratory Exam: Rhonchi





- Cardiovascular Exam


Cardiovascular Exam: RRR





- GI/Abdominal Exam


GI & Abdominal Exam: Distended, Normal Bowel Sounds.  absent: Rigid


Additional comments: 


Distended but not firm.





- Neurological Exam


Neurological Exam: Awake





Assessment and Plan


(1) Acute respiratory failure


Assessment & Plan: 


Intubated


Status: Acute





(2) Hyponatremia


Assessment & Plan: 


Improving


Status: Acute





(3) Sepsis


Assessment & Plan: 


Unsure source.  Consider colitis- but not severe on CT.  Consider c difficile.


Status: Acute





(4) Abdominal pain


Status: Acute





(5) GERD (gastroesophageal reflux disease)


Status: Chronic





(6) Colitis


Assessment & Plan: 


Colonoscopy done last month at last admission-  No significant colitis.  Mult 

stools were neg for c difficile.


REC: Vanco, flagyl, check stool for c difficile.. 


Consider surgical F/U, fecal transplant.


Status: Acute

## 2017-03-16 NOTE — RAD
HISTORY:

vented  



COMPARISON:

03/15/2017 



FINDINGS:



LUNGS:

No active pulmonary disease.



PLEURA:

No significant pleural effusion identified, no pneumothorax apparent.



CARDIOVASCULAR:

Normal.



OSSEOUS STRUCTURES:

No significant abnormalities.



VISUALIZED UPPER ABDOMEN:

Normal.



OTHER FINDINGS:

Endotracheal tube in satisfactory position.  Right IJ line in 

satisfactory position



IMPRESSION:

No active disease.

## 2017-03-16 NOTE — CP.PCM.HP
History of Present Illness





- History of Present Illness


History of Present Illness: 


Chief complaint:


Shortness of breath.





History present illness:


76-year-old female with history of seasonal disorder, chronic constipation, 

history of gastroesophageal reflux disease, osteoarthritis, multiple joint 

replacement surgery, hospitalized recently with a fall, injury.


Again patient was hospitalized with the colitis, abdominal distention.


Patient was discharged to the rehabitation, and she was doing well, she was 

able to be discharged, she started having high fever, chills, and shortness of 

breath.


Patient was immediately transferred to the [facility] emergency room, in 

the emergency room patient was noted to have respiratory distress, high fever, 

and chills, and the patient was hospitalized, and intubated in the emergency 

room.


Because of the current condition change in her situation patient needed 

respirated monitoring, and ventilator support.


Patient was initially hospitalized to the intensive care unit.





Upon admission to the ICU.  Patient was in severe hypotension, also having high 

fevers, chills.


Patient was also having elevated WBC.


Patient was hospitalized with the diagnosis of possible acute septic shock and 

respiratory failure and underlying colitis could not be ruled out at that time.





Past medical history:


Multiple arthritis problems, chronic constipation, chronic back problem, 

hypertension, seizure disorder.





Surgical history:


Appendectomy, cholecystectomy, joint replacement.  Both the knee and hips, 

history of hysterectomy





Allergy: Penicillin, influenza vaccine.





Patient while she was receiving vancomycin intravenously in the emergency room 

currently.  She become more respirated distance, and she become more intimate as

, hypertensive, acute respiratory distress, following that the patient was 

intubated.





Personal history:


Lifelong nonsmoker, nonalcoholic the patient lives with family members 

currently living in nursing home





Review of systems:


Patient is somewhat drowsy, sleepy at this time, on mechanical ventilator with 

sedation.


The blood pressure is on the low side, supported with medications.


Otherwise patient is moving.


Following commands otherwise.


Diarrhea noted.


Leg swelling noted.





On examination:


Vital signs reviewed.


Blood pressure is on the low side.


Patient is on ventilator, also.  Her to ventilation currently.


Chest good air entry bilaterally regular heart sound.  Nontender abdomen.  

Edema noted bilaterally.





Patient's labs reviewed.


Chest x-ray showing evidence of bilateral lower lung atelectasis.


WBC highly elevated.


Otherwise nonspecific labs.





Assessment/recommendation:


76-year-old female with history of seizure disorder, chronic constipation, 

hypertension, history of esophageal reflux disease, osteoarthritis, multiple 

joint replacement surgery recently hospitalized with acute colitis came to the 

emergency room currently from the nursing home with a sudden onset of febrile 

illness, condition, treated with acute respiratory failure following 

intravenous vancomycin injection, needing ventilator and respiratory support.


Patient is currently having possible severe septic shock associate with the 

elevated white count and fever.


Underlying colitis is possible.


The start the patient on Flagyl, by mouth vancomycin, rectal vancomycin, 

infectious disease evaluation, ventilator support.


IV fluid, fluid hydration, and the resuscitation.


Overall prognosis is guarded.


Spoke to the patient's brother and will follow the patient.





Present on Admission





- Present on Admission


Any Indicators Present on Admission: No


History of DVT/PE: No


History of Uncontrolled Diabetes: No


Urinary Catheter: No


Decubitus Ulcer Present: No





Past Patient History





- Infectious Disease


Hx of Infectious Diseases: None





- Tetanus Immunizations


Tetanus Immunization: Unknown, >10 years Ago





- Past Medical History & Family History


Past Medical History?: Yes





- Past Social History


Smoking Status: Never Smoked


Alcohol: None





- CARDIAC


Hx Hypertension: Yes (STRESS TEST 2015 DR FORD)





- PULMONARY


Hx Respiratory Disorders: No





- NEUROLOGICAL


Hx Seizures: Yes (last one about 15 years ago)





- HEENT


Hx HEENT Problems: Yes


Hx Cataracts: Yes





- RENAL


Hx Chronic Kidney Disease: No





- ENDOCRINE/METABOLIC


Hx Endocrine Disorders: No





- HEMATOLOGICAL/ONCOLOGICAL


Hx Blood Disorders: No





- INTEGUMENTARY


Hx Dermatological Problems: No





- MUSCULOSKELETAL/RHEUMATOLOGICAL


Hx Arthritis: Yes





- GASTROINTESTINAL


Hx Gall Bladder Disease: Yes





- GENITOURINARY/GYNECOLOGICAL


Hx Genitourinary Disorders: Yes


Hx Incontinence: Yes





- PSYCHIATRIC


Hx Anxiety: Yes


Hx Substance Use: No





- SURGICAL HISTORY


Hx Appendectomy: Yes (at 16 years old)


Hx Cholecystectomy: Yes (around 1978)





- ANESTHESIA


Hx Anesthesia: Yes


Hx Anesthesia Reactions: Yes (difficulty waking up)


Hx Malignant Hyperthermia: No





Meds


Allergies/Adverse Reactions: 


 Allergies











Allergy/AdvReac Type Severity Reaction Status Date / Time


 


Influenza Virus Vaccines Allergy  COUGH Verified 03/15/17 01:03


 


Penicillins Allergy  URTICARIA Verified 03/15/17 01:03


 


vancomycin Allergy   Verified 03/15/17 07:09














Results





- Vital Signs


Recent Vital Signs: 





 Last Vital Signs











Temp  97.9 F   03/15/17 16:00


 


Pulse  80   03/15/17 19:00


 


Resp  15   03/15/17 19:00


 


BP  107/43 L  03/15/17 18:57


 


Pulse Ox  95   03/15/17 19:00














- Labs


Result Diagrams: 


 03/28/17 13:35





 03/28/17 13:35


Labs: 





 Laboratory Results - last 24 hr











  03/15/17 03/15/17 03/15/17





  05:25 06:08 06:14


 


WBC   50.1 H* 


 


RBC   4.66 


 


Hgb   12.9 


 


Hct   38.5 


 


MCV   82.7 


 


MCH   27.7 


 


MCHC   33.5 


 


RDW   14.5 


 


Plt Count   406 H 


 


MPV   8.2 


 


Neut % (Auto)   87.3 H 


 


Lymph % (Auto)   3.5 L 


 


Mono % (Auto)   8.9 


 


Eos % (Auto)   0.1 


 


Baso % (Auto)   0.2 


 


Neut #   43.8 H 


 


Lymph #   1.7 


 


Mono #   4.5 H 


 


Eos #   0.0 


 


Baso #   0.1 


 


Neutrophils % (Manual)   31 L 


 


Band Neutrophils %   55 H* 


 


Lymphocytes % (Manual)   2 L 


 


Reactive Lymphs %   1 H 


 


Monocytes % (Manual)   8 


 


Metamyelocytes %   1 H 


 


Myelocytes %   2 H 


 


Toxic Granulation   Present 


 


Dohle Bodies   Present 


 


Jos Rods    


 


Platelet Estimate   Slightly increased H 


 


Plt Clumps, EDTA   Present 


 


Large Platelets   Present 


 


Giant Platelets   Present 


 


Poikilocytosis (manual   Slight 


 


Ovalocytes   Slight 


 


Hart Cells   


 


Smear Path Review    


 


Puncture Site  Lr  


 


pCO2  29 L  


 


pO2  312 H  


 


HCO3  18.6 L  


 


ABG pH  7.35  


 


ABG Total CO2  16.9 L  


 


ABG O2 Saturation  100.0 H  


 


ABG Base Excess  -8.2 L  


 


Martin Test  Pos  


 


ABG Potassium  3.3 L  


 


A-a O2 Difference  365.0  


 


Respiratory Index  1.2  


 


Sodium  122.0 L  118 L* 


 


Chloride  96.0 L  87 L 


 


Glucose  107 H  


 


Lactate  2.0  


 


Mechanical Rate  14  


 


FiO2  100.0  


 


Tidal Volume  500  


 


PEEP  5  


 


Potassium   3.5 L 


 


Carbon Dioxide   17 L 


 


Anion Gap   18 


 


BUN   23 H 


 


Creatinine   1.1 


 


Est GFR ( Amer)   58 


 


Est GFR (Non-Af Amer)   48 


 


Random Glucose   112 H 


 


Serum Osmolality   259 L 


 


Lactic Acid    1.9


 


Calcium   6.9 L 


 


Phosphorus   3.7 


 


Magnesium   1.6 


 


Total Bilirubin   0.9 


 


AST   49 H D 


 


ALT   26 


 


Alkaline Phosphatase   68 


 


Total Protein   5.5 L 


 


Albumin   2.3 L D 


 


Globulin   3.2 


 


Albumin/Globulin Ratio   0.7 L 


 


TSH 3rd Generation   7.83 H 


 


Arterial Blood Potassium  3.3 L  


 


Urine Osmolality   


 


Ur Random Sodium   


 


Influenza Typ A,B (EIA)   














  03/15/17 03/15/17 03/15/17





  06:21 07:57 10:43


 


WBC   


 


RBC   


 


Hgb   


 


Hct   


 


MCV   


 


MCH   


 


MCHC   


 


RDW   


 


Plt Count   


 


MPV   


 


Neut % (Auto)   


 


Lymph % (Auto)   


 


Mono % (Auto)   


 


Eos % (Auto)   


 


Baso % (Auto)   


 


Neut #   


 


Lymph #   


 


Mono #   


 


Eos #   


 


Baso #   


 


Neutrophils % (Manual)   


 


Band Neutrophils %   


 


Lymphocytes % (Manual)   


 


Reactive Lymphs %   


 


Monocytes % (Manual)   


 


Metamyelocytes %   


 


Myelocytes %   


 


Toxic Granulation   


 


Dohle Bodies   


 


Jos Rods   


 


Platelet Estimate   


 


Plt Clumps, EDTA   


 


Large Platelets   


 


Giant Platelets   


 


Poikilocytosis (manual   


 


Ovalocytes   


 


Hart Cells   


 


Smear Path Review   


 


Puncture Site   


 


pCO2   


 


pO2   


 


HCO3   


 


ABG pH   


 


ABG Total CO2   


 


ABG O2 Saturation   


 


ABG Base Excess   


 


Martin Test   


 


ABG Potassium   


 


A-a O2 Difference   


 


Respiratory Index   


 


Sodium   


 


Chloride   


 


Glucose   


 


Lactate   


 


Mechanical Rate   


 


FiO2   


 


Tidal Volume   


 


PEEP   


 


Potassium   


 


Carbon Dioxide   


 


Anion Gap   


 


BUN   


 


Creatinine   


 


Est GFR ( Amer)   


 


Est GFR (Non-Af Amer)   


 


Random Glucose   


 


Serum Osmolality    261 L


 


Lactic Acid   


 


Calcium   


 


Phosphorus   


 


Magnesium   


 


Total Bilirubin   


 


AST   


 


ALT   


 


Alkaline Phosphatase   


 


Total Protein   


 


Albumin   


 


Globulin   


 


Albumin/Globulin Ratio   


 


TSH 3rd Generation   


 


Arterial Blood Potassium   


 


Urine Osmolality   275 L 


 


Ur Random Sodium   7 


 


Influenza Typ A,B (EIA)  Negative for flu a/b  














  03/15/17





  13:55


 


WBC  59.0 H*


 


RBC  4.43


 


Hgb  11.8


 


Hct  36.7


 


MCV  82.8


 


MCH  26.7 L


 


MCHC  32.3 L


 


RDW  14.9 H


 


Plt Count  369


 


MPV  8.0


 


Neut % (Auto)  93.8 H


 


Lymph % (Auto)  2.0 L


 


Mono % (Auto)  3.8


 


Eos % (Auto)  0.0


 


Baso % (Auto)  0.4


 


Neut #  55.4 H


 


Lymph #  1.2


 


Mono #  2.2 H


 


Eos #  0.0


 


Baso #  0.2


 


Neutrophils % (Manual)  40 L


 


Band Neutrophils %  49 H*


 


Lymphocytes % (Manual)  1 L


 


Reactive Lymphs % 


 


Monocytes % (Manual)  6


 


Metamyelocytes %  2 H


 


Myelocytes %  2 H


 


Toxic Granulation  Present


 


Dohle Bodies  Present


 


Jos Rods  


 


Platelet Estimate  Normal


 


Plt Clumps, EDTA 


 


Large Platelets  Present


 


Giant Platelets 


 


Poikilocytosis (manual  Slight


 


Ovalocytes 


 


Mila Cells  Slight


 


Smear Path Review 


 


Puncture Site 


 


pCO2 


 


pO2 


 


HCO3 


 


ABG pH 


 


ABG Total CO2 


 


ABG O2 Saturation 


 


ABG Base Excess 


 


Martin Test 


 


ABG Potassium 


 


A-a O2 Difference 


 


Respiratory Index 


 


Sodium  122 L


 


Chloride  92 L


 


Glucose 


 


Lactate 


 


Mechanical Rate 


 


FiO2 


 


Tidal Volume 


 


PEEP 


 


Potassium  4.0


 


Carbon Dioxide  18 L


 


Anion Gap  16


 


BUN  21 H


 


Creatinine  0.9


 


Est GFR ( Amer)  > 60


 


Est GFR (Non-Af Amer)  > 60


 


Random Glucose  147 H


 


Serum Osmolality 


 


Lactic Acid 


 


Calcium  6.3 L


 


Phosphorus 


 


Magnesium  2.1


 


Total Bilirubin  0.3


 


AST  34


 


ALT  28


 


Alkaline Phosphatase  64


 


Total Protein  5.2 L


 


Albumin  2.2 L


 


Globulin  3.0


 


Albumin/Globulin Ratio  0.7 L


 


TSH 3rd Generation 


 


Arterial Blood Potassium 


 


Urine Osmolality 


 


Ur Random Sodium 


 


Influenza Typ A,B (EIA)

## 2017-03-17 NOTE — CP.CCUPN
<Hadley Spencer - Last Filed: 17 22:46>





CCU Subjective





- Physician Review


Subjective (Free Text): 


17 17:58


Patient seen at bedside and is in no acute distress.  Patient is intubated Vent 

settings ( FiO2 50% RR 14 PEEP 5 ). Isolation precautions in place due to 

c. diff colitis findings. Patient cannot comply to an ROS at this time due to 

intubation and sedation. Family bedside and informed of management.. 





17 12:11





Patient seen at beside and is in no acute distress. Patient was placed on a 

trial of CPAP with PS 10 and PEEP of 5. Patient tolerated CPAP well. Patient 

was extubated at 12pm with a follow up ABG at 1 pm. Patient remains on 8 mcg/

min of levophed. No ROS obtained at this time secondary to somnolence.





CCU Objective





- Vital Signs / Intake & Output


Vital Signs (Last 4 hours): 


Vital Signs











  Pulse Resp BP Pulse Ox


 


 17 07:00  102 H  25 H  90/47 L  100


 


 17 06:02  105 H  24  90/47 L  98


 


 17 06:00  105 H  22  99/49 L  97


 


 17 05:02  106 H  22  99/49 L  98


 


 17 05:00  97 H  23  116/50 L  98











Intake and Output (Last 8hrs): 


 Intake & Output











 17





 22:59 06:59 14:59


 


Intake Total 1467 1551 197


 


Output Total 235 230 20


 


Balance 1232 1321 177


 


Weight  172 lb 


 


Intake:   


 


  Intake, IV Amount 1467 1551 197


 


    Right Distal Port 252 336 42





    Internal Jugular   


 


    Right Proximal Port 975 975 125


 


    Right Medial Port 240 240 30





    Internal Jugular   


 


  Oral 0  


 


Output:   


 


  Urine 235 230 20


 


    Urethral (Gonzalez) 235 230 20














- Physical Exam


Head: Positive for: Atraumatic, Normocephalic


Pupils: Positive for: PERRL


Extroacular Muscles: Positive for: EOMI


Conjunctiva: Positive for: Normal


Ears: Positive for: Normal


Mouth: Positive for: Moist Mucous Membranes


Respiratory/Chest: Positive for: Clear to Auscultation.  Negative for: Wheezes


Cardiovascular: Positive for: Normal S1, S2


Abdomen: Positive for: Distention, Normal Bowel Sounds.  Negative for: 

Peritoneal Signs


Upper Extremity: Positive for: NORMAL PULSES


Lower Extremity: Negative for: Edema


Skin: Positive for: Warm, Dry


Psychiatric: Positive for: Other (sedated at this time).  Negative for: Alert





- Medications


Active Medications: 


Active Medications











Generic Name Dose Route Start Last Admin





  Trade Name Freq  PRN Reason Stop Dose Admin


 


Acetaminophen  650 mg 03/15/17 04:18 17 09:14





  Tylenol 325mg Tab  PO   650 mg





  Q4 PRN   Administration





  Fever >100.4 F   


 


Famotidine  20 mg 03/15/17 10:00 17 22:00





  Pepcid  IVP   20 mg





  Q12 ARIELLE   Administration


 


Heparin Sodium (Porcine)  5,000 units 03/15/17 06:00 17 06:00





  Heparin  SC   5,000 units





  Q8 ARIELLE   Administration


 


Metronidazole  100 mls @ 100 mls/hr 03/15/17 06:45 17 06:00





  Flagyl  IVPB   100 mls/hr





  Q8 ARIELLE   Administration


 


Norepinephrine Bitartrate 4 mg  254 mls @ 15.24 mls/hr 03/15/17 09:08 17 

02:00





  / Sodium Chloride  IV   30.48 mls/hr





  .[id number] PRN   Administration





  TITRATE PER MD ORDER   





  Protocol   





  4 MCG/MIN   


 


Sodium Chloride  1,000 mls @ 125 mls/hr 03/15/17 13:06 17 03:00





  Sodium Chloride 0.9%  IV   125 mls/hr





  .Q8H ARIELLE   Administration


 


Multivitamins/Vitamin C 10 ml/  1,010 mls @ 42 mls/hr 17 18:00 17 17

:44





  Amino Acids  IV 17 17:59  42 mls/hr





  .Q24H ONE   Administration


 


Fat Emulsion Intravenous  250 mls @ 42 mls/hr 17 18:00 17 17:41





  Intralipid 20%  IV 17 23:58  42 mls/hr





  [id number]@1800 ARIELLE   Administration


 


Potassium Chloride  100 mls @ 50 mls/hr 17 08:45  





  Potassium Chloride 20 Meq/100 Ml  IVPB 17 12:44  





  Q2H ARIELLE   


 


Vancomycin HCl  500 mg 03/15/17 10:00 17 22:00





  Vancocin (Oral Or Rectal Use)  PO   500 mg





  QID ARIELLE   Administration


 


Vancomycin HCl  500 mg 17 10:00 17 17:39





  Vancocin (Oral Or Rectal Use)  MS   500 mg





  TID ARIELLE   Administration














- Patient Studies


Lab Studies: 


 Lab Studies











  17 Range/Units





  05:59 05:09 06:34 


 


WBC  43.6 H*    (4.8-10.8)  K/uL


 


RBC  4.32    (3.80-5.20)  Mil/uL


 


Hgb  11.5    (11.0-16.0)  g/dL


 


Hct  36.2    (34.0-47.0)  %


 


MCV  83.7    (81.0-99.0)  fL


 


MCH  26.7 L    (27.0-31.0)  pg


 


MCHC  31.9 L    (33.0-37.0)  g/dL


 


RDW  14.9 H    (11.5-14.5)  %


 


Plt Count  414 H    (130-400)  K/uL


 


MPV  8.0    (7.2-11.7)  fL


 


Neut % (Auto)  85.5 H    (50.0-75.0)  %


 


Lymph % (Auto)  3.4 L    (20.0-40.0)  %


 


Mono % (Auto)  10.8 H    (0.0-10.0)  %


 


Eos % (Auto)  0.0    (0.0-4.0)  %


 


Baso % (Auto)  0.3    (0.0-2.0)  %


 


Neut #  37.3 H    (1.8-7.0)  K/uL


 


Lymph #  1.5    (1.0-4.3)  K/uL


 


Mono #  4.7 H    (0.0-0.8)  K/uL


 


Eos #  0.0    (0.0-0.7)  K/uL


 


Baso #  0.1    (0.0-0.2)  K/uL


 


Neutrophils % (Manual)  48 L   41 L  (50-75)  %


 


Band Neutrophils %  37 H*   50 H*  (0-2)  %


 


Lymphocytes % (Manual)  1 L   1 L  (20-40)  %


 


Monocytes % (Manual)  14 H   6  (0-10)  %


 


Metamyelocytes %    1 H  (0-0)  %


 


Myelocytes %    1 H  (0-0)  %


 


Platelet Estimate  Normal   Normal  (NORMAL)  


 


Plt Clumps, EDTA  Present    


 


Large Platelets  Present   Present  


 


Polychromasia    Slight  


 


Hypochromasia (manual)  Slight    


 


Poikilocytosis (manual  Slight   Slight  


 


Anisocytosis (manual)  Slight    


 


Mila Cells  Slight   Slight  


 


Puncture Site   Lrad   


 


pCO2   22 L   (35-45)  mm/Hg


 


pO2   160 H   ()  mm/Hg


 


HCO3   15.8 L   (21-28)  mmol/L


 


ABG pH   7.35   (7.35-7.45)  


 


ABG Total CO2   12.8 L   (22-28)  mmol/L


 


ABG O2 Saturation   99.3 H   (95-98)  %


 


ABG Base Excess   -11.7 L   (-2.0-3.0)  mmol/L


 


ABG Hemoglobin   11.5 L   (11.7-17.4)  g/dL


 


ABG Carboxyhemoglobin   1.3   (0.5-1.5)  %


 


POC ABG HHb (Measured)   0.7   (0.0-5.0)  %


 


ABG Methemoglobin   1.6   (0.0-3.0)  %


 


Martin Test   Pos   


 


A-a O2 Difference   169.0   mm/Hg


 


Respiratory Index   1.1   


 


Hgb O2 Saturation   96.3   (95.0-98.0)  %


 


Mechanical Rate   14   


 


FiO2   50.0   %


 


Tidal Volume   500   


 


PEEP   5   


 


Sodium  127 L    (132-148)  mmol/L


 


Potassium  3.5 L    (3.6-5.2)  mmol/L


 


Chloride  99    ()  mmol/L


 


Carbon Dioxide  16 L    (22-30)  mmol/L


 


Anion Gap  16    (10-20)  


 


BUN  22 H    (7-17)  mg/dL


 


Creatinine  0.8    (0.7-1.2)  MG/DL


 


Est GFR (African Amer)  > 60    


 


Est GFR (Non-Af Amer)  > 60    


 


Random Glucose  106 H    ()  mg/dL


 


Calcium  6.3 L    (8.6-10.4)  mg/dl


 


Phosphorus  2.8    (2.5-4.5)  mg/dL


 


Magnesium  2.1    (1.6-2.3)  mg/dL


 


Total Bilirubin  0.6    (0.2-1.3)  mg/dL


 


AST  26    (14-36)  U/L


 


ALT  21    (9-52)  U/L


 


Alkaline Phosphatase  98    ()  U/L


 


Total Protein  4.8 L    (6.3-8.3)  g/dL


 


Albumin  2.0 L    (3.5-5.0)  g/dL


 


Globulin  2.8    (2.2-3.9)  gm/dL


 


Albumin/Globulin Ratio  0.7 L    (1.0-2.1)  


 


C. difficile Ag & Toxin     (NEGATIVE)  














  03/15/17 Range/Units





  10:15 


 


WBC   (4.8-10.8)  K/uL


 


RBC   (3.80-5.20)  Mil/uL


 


Hgb   (11.0-16.0)  g/dL


 


Hct   (34.0-47.0)  %


 


MCV   (81.0-99.0)  fL


 


MCH   (27.0-31.0)  pg


 


MCHC   (33.0-37.0)  g/dL


 


RDW   (11.5-14.5)  %


 


Plt Count   (130-400)  K/uL


 


MPV   (7.2-11.7)  fL


 


Neut % (Auto)   (50.0-75.0)  %


 


Lymph % (Auto)   (20.0-40.0)  %


 


Mono % (Auto)   (0.0-10.0)  %


 


Eos % (Auto)   (0.0-4.0)  %


 


Baso % (Auto)   (0.0-2.0)  %


 


Neut #   (1.8-7.0)  K/uL


 


Lymph #   (1.0-4.3)  K/uL


 


Mono #   (0.0-0.8)  K/uL


 


Eos #   (0.0-0.7)  K/uL


 


Baso #   (0.0-0.2)  K/uL


 


Neutrophils % (Manual)   (50-75)  %


 


Band Neutrophils %   (0-2)  %


 


Lymphocytes % (Manual)   (20-40)  %


 


Monocytes % (Manual)   (0-10)  %


 


Metamyelocytes %   (0-0)  %


 


Myelocytes %   (0-0)  %


 


Platelet Estimate   (NORMAL)  


 


Plt Clumps, EDTA   


 


Large Platelets   


 


Polychromasia   


 


Hypochromasia (manual)   


 


Poikilocytosis (manual   


 


Anisocytosis (manual)   


 


Mila Cells   


 


Puncture Site   


 


pCO2   (35-45)  mm/Hg


 


pO2   ()  mm/Hg


 


HCO3   (21-28)  mmol/L


 


ABG pH   (7.35-7.45)  


 


ABG Total CO2   (22-28)  mmol/L


 


ABG O2 Saturation   (95-98)  %


 


ABG Base Excess   (-2.0-3.0)  mmol/L


 


ABG Hemoglobin   (11.7-17.4)  g/dL


 


ABG Carboxyhemoglobin   (0.5-1.5)  %


 


POC ABG HHb (Measured)   (0.0-5.0)  %


 


ABG Methemoglobin   (0.0-3.0)  %


 


Martin Test   


 


A-a O2 Difference   mm/Hg


 


Respiratory Index   


 


Hgb O2 Saturation   (95.0-98.0)  %


 


Mechanical Rate   


 


FiO2   %


 


Tidal Volume   


 


PEEP   


 


Sodium   (132-148)  mmol/L


 


Potassium   (3.6-5.2)  mmol/L


 


Chloride   ()  mmol/L


 


Carbon Dioxide   (22-30)  mmol/L


 


Anion Gap   (10-20)  


 


BUN   (7-17)  mg/dL


 


Creatinine   (0.7-1.2)  MG/DL


 


Est GFR (African Amer)   


 


Est GFR (Non-Af Amer)   


 


Random Glucose   ()  mg/dL


 


Calcium   (8.6-10.4)  mg/dl


 


Phosphorus   (2.5-4.5)  mg/dL


 


Magnesium   (1.6-2.3)  mg/dL


 


Total Bilirubin   (0.2-1.3)  mg/dL


 


AST   (14-36)  U/L


 


ALT   (9-52)  U/L


 


Alkaline Phosphatase   ()  U/L


 


Total Protein   (6.3-8.3)  g/dL


 


Albumin   (3.5-5.0)  g/dL


 


Globulin   (2.2-3.9)  gm/dL


 


Albumin/Globulin Ratio   (1.0-2.1)  


 


C. difficile Ag & Toxin  Positive H  (NEGATIVE)  








 Laboratory Results - last 24 hr











  03/15/17 03/16/17 03/17/17





  10:15 06:34 05:09


 


WBC   


 


RBC   


 


Hgb   


 


Hct   


 


MCV   


 


MCH   


 


MCHC   


 


RDW   


 


Plt Count   


 


MPV   


 


Neut % (Auto)   


 


Lymph % (Auto)   


 


Mono % (Auto)   


 


Eos % (Auto)   


 


Baso % (Auto)   


 


Neut #   


 


Lymph #   


 


Mono #   


 


Eos #   


 


Baso #   


 


Neutrophils % (Manual)   41 L 


 


Band Neutrophils %   50 H* 


 


Lymphocytes % (Manual)   1 L 


 


Monocytes % (Manual)   6 


 


Metamyelocytes %   1 H 


 


Myelocytes %   1 H 


 


Platelet Estimate   Normal 


 


Plt Clumps, EDTA   


 


Large Platelets   Present 


 


Polychromasia   Slight 


 


Hypochromasia (manual)   


 


Poikilocytosis (manual   Slight 


 


Anisocytosis (manual)   


 


Mila Cells   Slight 


 


Puncture Site    Lrad


 


pCO2    22 L


 


pO2    160 H


 


HCO3    15.8 L


 


ABG pH    7.35


 


ABG Total CO2    12.8 L


 


ABG O2 Saturation    99.3 H


 


ABG Base Excess    -11.7 L


 


ABG Hemoglobin    11.5 L


 


ABG Carboxyhemoglobin    1.3


 


POC ABG HHb (Measured)    0.7


 


ABG Methemoglobin    1.6


 


Martin Test    Pos


 


A-a O2 Difference    169.0


 


Respiratory Index    1.1


 


Hgb O2 Saturation    96.3


 


Mechanical Rate    14


 


FiO2    50.0


 


Tidal Volume    500


 


PEEP    5


 


Sodium   


 


Potassium   


 


Chloride   


 


Carbon Dioxide   


 


Anion Gap   


 


BUN   


 


Creatinine   


 


Est GFR ( Amer)   


 


Est GFR (Non-Af Amer)   


 


Random Glucose   


 


Calcium   


 


Phosphorus   


 


Magnesium   


 


Total Bilirubin   


 


AST   


 


ALT   


 


Alkaline Phosphatase   


 


Total Protein   


 


Albumin   


 


Globulin   


 


Albumin/Globulin Ratio   


 


C. difficile Ag & Toxin  Positive H  














  17





  05:59


 


WBC  43.6 H*


 


RBC  4.32


 


Hgb  11.5


 


Hct  36.2


 


MCV  83.7


 


MCH  26.7 L


 


MCHC  31.9 L


 


RDW  14.9 H


 


Plt Count  414 H


 


MPV  8.0


 


Neut % (Auto)  85.5 H


 


Lymph % (Auto)  3.4 L


 


Mono % (Auto)  10.8 H


 


Eos % (Auto)  0.0


 


Baso % (Auto)  0.3


 


Neut #  37.3 H


 


Lymph #  1.5


 


Mono #  4.7 H


 


Eos #  0.0


 


Baso #  0.1


 


Neutrophils % (Manual)  48 L


 


Band Neutrophils %  37 H*


 


Lymphocytes % (Manual)  1 L


 


Monocytes % (Manual)  14 H


 


Metamyelocytes % 


 


Myelocytes % 


 


Platelet Estimate  Normal


 


Plt Clumps, EDTA  Present


 


Large Platelets  Present


 


Polychromasia 


 


Hypochromasia (manual)  Slight


 


Poikilocytosis (manual  Slight


 


Anisocytosis (manual)  Slight


 


[place] Cells  Slight


 


Puncture Site 


 


pCO2 


 


pO2 


 


HCO3 


 


ABG pH 


 


ABG Total CO2 


 


ABG O2 Saturation 


 


ABG Base Excess 


 


ABG Hemoglobin 


 


ABG Carboxyhemoglobin 


 


POC ABG HHb (Measured) 


 


ABG Methemoglobin 


 


Martin Test 


 


A-a O2 Difference 


 


Respiratory Index 


 


Hgb O2 Saturation 


 


Mechanical Rate 


 


FiO2 


 


Tidal Volume 


 


PEEP 


 


Sodium  127 L


 


Potassium  3.5 L


 


Chloride  99


 


Carbon Dioxide  16 L


 


Anion Gap  16


 


BUN  22 H


 


Creatinine  0.8


 


Est GFR ( Amer)  > 60


 


Est GFR (Non-Af Amer)  > 60


 


Random Glucose  106 H


 


Calcium  6.3 L


 


Phosphorus  2.8


 


Magnesium  2.1


 


Total Bilirubin  0.6


 


AST  26


 


ALT  21


 


Alkaline Phosphatase  98


 


Total Protein  4.8 L


 


Albumin  2.0 L


 


Globulin  2.8


 


Albumin/Globulin Ratio  0.7 L


 


C. difficile Ag & Toxin 














Review of Systems





- Review of Systems


Review of Systems: 


as noted in subjective





Assessment/Plan





- Assessment and Plan (Free Text)


Assessment: 


77 year old female with a past medical history of HTN, arthritis, anxiety, 

seizures, back problems and gall bladder disease presents with C-diff colitis. 

Patient on antibiotics and monitored in critical care setting.


Plan: 


Neuro: 


Neuro check q4 


03/15 CT Head: no definite acute intracranial abnormality . 


 


Cardio: 


Maintain systolic BP >110 


Levophed IV 4mg /NS  @ 8mcg/min 


Imaging: 


3/17 CXR: feeding tube above level of diaphragm 


3/16: CXR: No active disease 


3/15 CXR: No active disease 


3/15 EKG: NSR @ 99 bpm 


3/15 CXR 08:50: distal tip of vascular catheter overlying the projection of the 

expected location of the SVC 


3/14 CXR 00:53: Developing hazy opacity overlying right upper lobe.  


 


Pulm: 


Maintain O2 Sat >92% 


3/17 Vent settings: extubated at 12:00pm, f/u  13:00 ABG 


3/16 Vent Settings: FiO2 50% RR 14 PEEP 5 ,  Peak 24   


ABG:  


3/17 05:09  pH 7.35, CO2 22, O2 160, HCO3 15.8, base excess 11.7 


3/16  05:16 pH 7.37, CO2 22, O2 142, HCO3 15.8, base excess 9.2 


3/15 05:25 pH 7.35, CO2 29, O2 312, HCO3 18.6, base excess 8.2  


Imagin/17 CXR: feeding tube above the level of the diaphragm 


03/15 CXR: correct placement of ET tube. 


 


Endo: 


Maintain euglycemia 


 


GI: 


C. diff colitis 


Monitor BM 


03/15 CT Abdomen/Pelvis: Colitis, indeterminate splenic lesion 


 


: 


I/Os 4125.5/737/3388.5 


Monitor I/Os 


Gonzalez is in place 


Maintain Gonzalez care 


 


Renal: 


BUN/Cr: 22/0.8 


Monitor I/Os 


Daily BMP 


NS 1000 cc @ 125cc/hr Q5H 


 


Heme: 


H&H-  11.5/36.2 


Monitor CBC 


 


ID:  


C diff colitis 


Low grade fever 


WBC: 43.6 (36.3 --> 50.1 --> 59.0 --> 50.3-->43.6) 


Neutrophils: 37.3 (31-->40-->41 -->37.3) 


Bands: 37 (55-->49-->50-->37) 


3/16: C-diff toxin Positive 


Daily CBC 


Acetaminophen 650 mg PO Q4 PRN Fever >100.4 F 


Flagyl 100 cc @ 100 cc/hr IVPB Q8 ARIELLE 


Vancomycin 500 mg PO QID 


Rifaximin 550 mg PO BID ARIELLE 


 


Prophylaxis: 


GI: Pepcid 20 mg IVP Q12 


DVT:  Heparin 5,000 units SC Q8





<Ketan Gimenez M - Last Filed: 17 23:55>





CCU Objective





- Vital Signs / Intake & Output


Vital Signs (Last 4 hours): 


Vital Signs











  BP


 


 17 21:56  89/59 L











Intake and Output (Last 8hrs): 


 Intake & Output











 17





 14:59 22:59 06:59


 


Intake Total 640 420 


 


Output Total 1365 380 


 


Balance -725 40 


 


Intake:   


 


  Intake, IV Amount 100  


 


    Right Medial Port 100  





    Internal Jugular   


 


  Oral 540 420 


 


Output:   


 


  Urine 1365 380 


 


    Urethral (Gonzalez) 1365 380 


 


Other:   


 


  # Bowel Movements 1  














- Medications


Active Medications: 


Active Medications











Generic Name Dose Route Start Last Admin





  Trade Name Freq  PRN Reason Stop Dose Admin


 


Acetaminophen  650 mg 03/15/17 04:18 17 09:14





  Tylenol 325mg Tab  PO   650 mg





  Q4 PRN   Administration





  Fever >100.4 F   


 


Famotidine  20 mg 03/15/17 10:00 17 21:56





  Pepcid  IVP   20 mg





  Q12 ARIELLE   Administration


 


Furosemide  40 mg 17 07:45 17 21:56





  Lasix  IVP   Not Given





  Q12 ARIELLE   


 


Heparin Sodium (Porcine)  5,000 units 17 22:00 17 21:55





  Heparin  SC   5,000 units





  Q12 ARIELLE   Administration


 


Metronidazole  100 mls @ 100 mls/hr 03/15/17 06:45 17 21:00





  Flagyl  IVPB   100 mls/hr





  Q8 ARIELLE   Administration


 


Rifaximin  550 mg 17 10:45 17 17:29





  Xifaxan  PO   550 mg





  BID ARIELLE   Administration


 


Saccharomyces Boulardii  250 mg 17 10:00 17 17:30





  Florastor  PO   250 mg





  TID ARIELLE   Administration


 


Vancomycin HCl  500 mg 03/15/17 10:00 17 21:55





  Vancocin (Oral Or Rectal Use)  PO   500 mg





  QID ARIELLE   Administration














- Patient Studies


Lab Studies: 


 Lab Studies











  17 Range/Units





  06:25 


 


WBC  32.7 H  (4.8-10.8)  K/uL


 


RBC  4.29  (3.80-5.20)  Mil/uL


 


Hgb  11.7  (11.0-16.0)  g/dL


 


Hct  36.6  (34.0-47.0)  %


 


MCV  85.2  (81.0-99.0)  fL


 


MCH  27.3  (27.0-31.0)  pg


 


MCHC  32.0 L  (33.0-37.0)  g/dL


 


RDW  15.8 H  (11.5-14.5)  %


 


Plt Count  523 H D  (130-400)  K/uL


 


MPV  7.9  (7.2-11.7)  fL


 


Neut % (Auto)  78.2 H  (50.0-75.0)  %


 


Lymph % (Auto)  13.1 L  (20.0-40.0)  %


 


Mono % (Auto)  8.2  (0.0-10.0)  %


 


Eos % (Auto)  0.3  (0.0-4.0)  %


 


Baso % (Auto)  0.2  (0.0-2.0)  %


 


Neut #  25.6 H  (1.8-7.0)  K/uL


 


Lymph #  4.3  (1.0-4.3)  K/uL


 


Mono #  2.7 H  (0.0-0.8)  K/uL


 


Eos #  0.1  (0.0-0.7)  K/uL


 


Baso #  0.1  (0.0-0.2)  K/uL


 


Neutrophils % (Manual)  57  (50-75)  %


 


Band Neutrophils %  13 H*  (0-2)  %


 


Lymphocytes % (Manual)  19 L  (20-40)  %


 


Monocytes % (Manual)  6  (0-10)  %


 


Metamyelocytes %  2 H  (0-0)  %


 


Myelocytes %  3 H  (0-0)  %


 


Nucleated RBC %  2 H  (0-0)  %


 


Platelet Estimate  Slightly increased H  (NORMAL)  


 


Polychromasia  Slight  


 


Hypochromasia (manual)  Slight  


 


Anisocytosis (manual)  Slight  


 


Sodium  130 L  (132-148)  mmol/L


 


Potassium  4.0  (3.6-5.2)  mmol/L


 


Chloride  100  ()  mmol/L


 


Carbon Dioxide  21 L  (22-30)  mmol/L


 


Anion Gap  13  (10-20)  


 


BUN  11  (7-17)  mg/dL


 


Creatinine  0.5 L  (0.7-1.2)  MG/DL


 


Est GFR (African Amer)  > 60  


 


Est GFR (Non-Af Amer)  > 60  


 


Random Glucose  77  ()  mg/dL


 


Calcium  7.5 L  (8.6-10.4)  mg/dl


 


Phosphorus  4.3  (2.5-4.5)  mg/dL


 


Magnesium  1.9  (1.6-2.3)  mg/dL


 


Total Bilirubin  0.2  (0.2-1.3)  mg/dL


 


AST  37 H  (14-36)  U/L


 


ALT  23  (9-52)  U/L


 


Alkaline Phosphatase  37 L D  ()  U/L


 


Total Protein  4.3 L  (6.3-8.3)  g/dL


 


Albumin  2.0 L  (3.5-5.0)  g/dL


 


Globulin  2.3  (2.2-3.9)  gm/dL


 


Albumin/Globulin Ratio  0.9 L  (1.0-2.1)  








 Laboratory Results - last 24 hr











  17





  06:25


 


WBC  32.7 H


 


RBC  4.29


 


Hgb  11.7


 


Hct  36.6


 


MCV  85.2


 


MCH  27.3


 


MCHC  32.0 L


 


RDW  15.8 H


 


Plt Count  523 H D


 


MPV  7.9


 


Neut % (Auto)  78.2 H


 


Lymph % (Auto)  13.1 L


 


Mono % (Auto)  8.2


 


Eos % (Auto)  0.3


 


Baso % (Auto)  0.2


 


Neut #  25.6 H


 


Lymph #  4.3


 


Mono #  2.7 H


 


Eos #  0.1


 


Baso #  0.1


 


Neutrophils % (Manual)  57


 


Band Neutrophils %  13 H*


 


Lymphocytes % (Manual)  19 L


 


Monocytes % (Manual)  6


 


Metamyelocytes %  2 H


 


Myelocytes %  3 H


 


Nucleated RBC %  2 H


 


Platelet Estimate  Slightly increased H


 


Polychromasia  Slight


 


Hypochromasia (manual)  Slight


 


Anisocytosis (manual)  Slight


 


Sodium  130 L


 


Potassium  4.0


 


Chloride  100


 


Carbon Dioxide  21 L


 


Anion Gap  13


 


BUN  11


 


Creatinine  0.5 L


 


Est GFR ( Amer)  > 60


 


Est GFR (Non-Af Amer)  > 60


 


Random Glucose  77


 


Calcium  7.5 L


 


Phosphorus  4.3


 


Magnesium  1.9


 


Total Bilirubin  0.2


 


AST  37 H


 


ALT  23


 


Alkaline Phosphatase  37 L D


 


Total Protein  4.3 L


 


Albumin  2.0 L


 


Globulin  2.3


 


Albumin/Globulin Ratio  0.9 L














Critical Care Progress Note





- Nutrition


Nutrition: 


 Nutrition











 Category Date Time Status


 


 Pureed [Dysphagia/Modified Consistency Diet] [DIET] Diets  17 Breakfast 

Active














Attending/Attestation





- Attestation


I have personally seen and examined this patient.: Yes


I have fully participated in the care of the patient.: Yes


Notes (Text): 








Today: 2017





The Patient was seen and examined at the bedside, Medical records reviewed, all 

clinical/lab/hemodynamic/radiographic data were reviewed and management issues 

were discussed and formulated,


Events reviewed


Pain issues, skin care, head of the bed elevation, glycemic control were 

addressed.








Agree with above treatment plans as transcribed in Dr. Spencer note

## 2017-03-17 NOTE — RAD
HISTORY:

intubated  



COMPARISON:

03/16/2017. 



FINDINGS:



LUNGS:

No focal airspace opacity. Please note that the right apex is not 

clearly seen.



PLEURA:

No significant pleural effusion identified, no pneumothorax apparent.



CARDIOVASCULAR:

Normal.



OSSEOUS STRUCTURES:

The osseous structures demonstrate degenerative changes. 



VISUALIZED UPPER ABDOMEN:

Upper abdomen is suboptimally evaluated. 



OTHER FINDINGS:

Presumed right-sided vascular catheter with the distal tip of the 

catheter overlying the projection of the SVC/right atrial junction. 

ET tube with the distal tip above the tracheal bifurcation.



Feeding tube noted with the distal tip above the level of the 

diaphragm.



IMPRESSION:

Feeding tube noted above the level of the diaphragm. 



Discussed with nurse Rae approximately 9:55 on 03/17/2017.

## 2017-03-18 NOTE — CP.PCM.PN
Subjective





- Date & Time of Evaluation


Date of Evaluation: 03/17/17


Time of Evaluation: 10:00





- Subjective


Subjective: 


F/U colitis.


Pt was seen 3/17/17.


Some soft BMs.


No report of RB, melena, hemoptysis, cough, hematuria, SZ, tremor





Objective





- Vital Signs/Intake and Output


Vital Signs (last 24 hours): 


 











Temp Pulse Resp BP Pulse Ox


 


 99.3 F   63   20   115/54 L  100 


 


 03/18/17 04:00  03/18/17 04:00  03/18/17 04:00  03/18/17 04:00  03/18/17 04:00








Intake and Output: 


 











 03/18/17 03/18/17





 06:59 18:59


 


Intake Total 2111.8 161.4


 


Output Total 192 30


 


Balance 1919.8 131.4














- Medications


Medications: 


 Current Medications





Acetaminophen (Tylenol 325mg Tab)  650 mg PO Q4 PRN


   PRN Reason: Fever >100.4 F


   Last Admin: 03/16/17 09:14 Dose:  650 mg


Famotidine (Pepcid)  20 mg IVP Q12 [facility]


   Last Admin: 03/17/17 22:30 Dose:  20 mg


Heparin Sodium (Porcine) (Heparin)  5,000 units SC Q8 [facility]


   Last Admin: 03/18/17 06:36 Dose:  5,000 units


Metronidazole (Flagyl)  100 mls @ 100 mls/hr IVPB Q8 [facility]


   Last Admin: 03/18/17 06:34 Dose:  100 mls/hr


Norepinephrine Bitartrate 4 mg (/ Sodium Chloride)  254 mls @ 15.24 mls/hr IV 

.[id number] PRN; Protocol; 4 MCG/MIN


   PRN Reason: TITRATE PER MD ORDER


   Last Admin: 03/17/17 13:19 Dose:  11.43 mls/hr


Sodium Chloride (Sodium Chloride 0.9%)  1,000 mls @ 125 mls/hr IV .Q8H [facility]


   Last Admin: 03/18/17 06:37 Dose:  125 mls/hr


Midazolam HCl (Versed Inj)  1 mg IV Q6H PRN


   PRN Reason: Sedation


   Last Admin: 03/17/17 22:30 Dose:  1 mg


Rifaximin (Xifaxan)  550 mg PO BID [facility]


   Last Admin: 03/17/17 18:19 Dose:  550 mg


Vancomycin HCl (Vancocin (Oral Or Rectal Use))  500 mg PO QID [facility]


   Last Admin: 03/17/17 22:30 Dose:  500 mg











- Labs


Labs: 


 





 03/18/17 06:36 





 03/18/17 06:36 





 











PT  14.8 SECONDS (9.7-12.2)  H  03/15/17  01:01    


 


INR  1.3   03/15/17  01:01    


 


APTT  37 SECONDS (21-34)  H  03/15/17  01:01    














- Respiratory Exam


Respiratory Exam: Clear to Ausculation Bilateral





- Cardiovascular Exam


Cardiovascular Exam: RRR





- GI/Abdominal Exam


GI & Abdominal Exam: Distended, Tenderness, Normal Bowel Sounds


Additional comments: 


Mild mid tenderness





- Neurological Exam


Neurological Exam: Alert, Awake





Assessment and Plan


(1) Acute respiratory failure


Status: Acute





(2) Hyponatremia


Status: Acute





(3) Sepsis


Assessment & Plan: 


wbc sl improving


Status: Acute





(4) Abdominal pain


Status: Acute





(5) GERD (gastroesophageal reflux disease)


Status: Chronic





(6) Colitis


Assessment & Plan: 


r/o c difficile


Check stools.  F/u wbc.  Continue vanco


Status: Acute

## 2017-03-18 NOTE — CP.PCM.PN
Subjective





- Date & Time of Evaluation


Date of Evaluation: 03/18/17


Time of Evaluation: 10:30





- Subjective


Subjective: 


F/U colitis.


C difficile is +


Occasional soft stools.


No report of RB, melena, SZ, hemoptysis, hematuria, HA, cough, tremor.





Objective





- Vital Signs/Intake and Output


Vital Signs (last 24 hours): 


 











Temp Pulse Resp BP Pulse Ox


 


 99.3 F   63   20   93/47 L  100 


 


 03/18/17 04:00  03/18/17 04:00  03/18/17 04:00  03/18/17 10:44  03/18/17 04:00








Intake and Output: 


 











 03/18/17 03/18/17





 06:59 18:59


 


Intake Total 2111.8 161.4


 


Output Total 192 30


 


Balance 1919.8 131.4














- Medications


Medications: 


 Current Medications





Acetaminophen (Tylenol 325mg Tab)  650 mg PO Q4 PRN


   PRN Reason: Fever >100.4 F


   Last Admin: 03/16/17 09:14 Dose:  650 mg


Famotidine (Pepcid)  20 mg IVP Q12 [facility]


   Last Admin: 03/18/17 10:42 Dose:  20 mg


Heparin Sodium (Porcine) (Heparin)  5,000 units SC Q8 [facility]


   Last Admin: 03/18/17 06:36 Dose:  5,000 units


Metronidazole (Flagyl)  100 mls @ 100 mls/hr IVPB Q8 [facility]


   Last Admin: 03/18/17 06:34 Dose:  100 mls/hr


Norepinephrine Bitartrate 4 mg (/ Sodium Chloride)  254 mls @ 15.24 mls/hr IV 

.[id number] PRN; Protocol; 4 MCG/MIN


   PRN Reason: TITRATE PER MD ORDER


   Last Admin: 03/18/17 10:44 Dose:  11.43 mls/hr


Sodium Chloride (Sodium Chloride 0.9%)  1,000 mls @ 125 mls/hr IV .Q8H [facility]


   Last Admin: 03/18/17 06:37 Dose:  125 mls/hr


Midazolam HCl (Versed Inj)  1 mg IV Q6H PRN


   PRN Reason: Sedation


   Last Admin: 03/17/17 22:30 Dose:  1 mg


Rifaximin (Xifaxan)  550 mg PO BID [facility]


   Last Admin: 03/18/17 10:42 Dose:  550 mg


Vancomycin HCl (Vancocin (Oral Or Rectal Use))  500 mg PO QID [facility]


   Last Admin: 03/18/17 10:41 Dose:  500 mg


Vancomycin HCl (Vancocin (Oral Or Rectal Use))  500 mg [place] TID [facility]


   Last Admin: 03/18/17 10:42 Dose:  500 mg











- Labs


Labs: 


 





 03/18/17 06:36 





 03/18/17 06:36 





 











PT  14.8 SECONDS (9.7-12.2)  H  03/15/17  01:01    


 


INR  1.3   03/15/17  01:01    


 


APTT  37 SECONDS (21-34)  H  03/15/17  01:01    














- Respiratory Exam


Respiratory Exam: Clear to Ausculation Bilateral





- Cardiovascular Exam


Cardiovascular Exam: RRR





- GI/Abdominal Exam


GI & Abdominal Exam: Distended, Tenderness


Additional comments: 


BSs present.  Mild mid abdom tenderness.





- Neurological Exam


Additional comments: 


responds, opens eyes.





Assessment and Plan


(1) Acute respiratory failure


Status: Acute





(2) Hyponatremia


Status: Acute





(3) Sepsis


Status: Acute





(4) Abdominal pain


Status: Acute





(5) GERD (gastroesophageal reflux disease)


Status: Chronic





(6) Colitis


Assessment & Plan: 


c difficile..


Continue meds.  Check CBC.


Status: Acute

## 2017-03-18 NOTE — CP.PCM.PN
Subjective





- Date & Time of Evaluation


Date of Evaluation: 03/18/17


Time of Evaluation: 14:42





- Subjective


Subjective: 


INFECTIOUS DISEASE ICU PROGRESS NOTES


NANCY JUÁREZ MD, FACP


3/17-18/2017


ICU/CCU #6





77 YEAR OLD WHITE FEMALE SLOWLY RESPONDING TO IV FLAGYL AND PO/NV VANCOMYCIN.





OF NOTE HER FAMILY AND STAFF MENTION THAT SHE WAS SELF MEDICATING HERSELF WITH 

IMODIUM AT HE REHAB/NURSING HOME.








Objective





- Vital Signs/Intake and Output


Vital Signs (last 24 hours): 


 











Temp Pulse Resp BP Pulse Ox


 


 99.3 F   63   20   93/47 L  90 L


 


 03/18/17 04:00  03/18/17 04:00  03/18/17 04:00  03/18/17 10:44  03/18/17 12:47








Intake and Output: 


 











 03/18/17 03/18/17





 06:59 18:59


 


Intake Total 2111.8 161.4


 


Output Total 192 30


 


Balance 1919.8 131.4














- Medications


Medications: 


 Current Medications





Acetaminophen (Tylenol 325mg Tab)  650 mg PO Q4 PRN


   PRN Reason: Fever >100.4 F


   Last Admin: 03/16/17 09:14 Dose:  650 mg


Famotidine (Pepcid)  20 mg IVP Q12 [facility]


   Last Admin: 03/18/17 10:42 Dose:  20 mg


Heparin Sodium (Porcine) (Heparin)  5,000 units SC Q8 [facility]


   Last Admin: 03/18/17 06:36 Dose:  5,000 units


Metronidazole (Flagyl)  100 mls @ 100 mls/hr IVPB Q8 [facility]


   Last Admin: 03/18/17 06:34 Dose:  100 mls/hr


Norepinephrine Bitartrate 4 mg (/ Sodium Chloride)  254 mls @ 15.24 mls/hr IV 

.[id number] PRN; Protocol; 4 MCG/MIN


   PRN Reason: TITRATE PER MD ORDER


   Last Admin: 03/18/17 10:44 Dose:  11.43 mls/hr


Sodium Chloride (Sodium Chloride 0.9%)  1,000 mls @ 125 mls/hr IV .Q8H [facility]


   Last Admin: 03/18/17 13:01 Dose:  Not Given


Midazolam HCl (Versed Inj)  1 mg IV Q6H PRN


   PRN Reason: Sedation


   Last Admin: 03/17/17 22:30 Dose:  1 mg


Rifaximin (Xifaxan)  550 mg PO BID [facility]


   Last Admin: 03/18/17 10:42 Dose:  550 mg


Vancomycin HCl (Vancocin (Oral Or Rectal Use))  500 mg PO QID [facility]


   Last Admin: 03/18/17 10:41 Dose:  500 mg


Vancomycin HCl (Vancocin (Oral Or Rectal Use))  500 mg [place] TID [facility]


   Last Admin: 03/18/17 10:42 Dose:  500 mg











- Labs


Labs: 


 





 03/18/17 06:36 





 03/18/17 06:36 





 











PT  14.8 SECONDS (9.7-12.2)  H  03/15/17  01:01    


 


INR  1.3   03/15/17  01:01    


 


APTT  37 SECONDS (21-34)  H  03/15/17  01:01    














- Constitutional


Appears: In Acute Distress, Chronically Ill





- Head Exam


Head Exam: ATRAUMATIC, NORMOCEPHALIC





- Eye Exam


Eye Exam: Normal appearance





- ENT Exam


ENT Exam: Mucous Membranes Moist





- Neck Exam


Additional comments: 


INTUBATED





- Respiratory Exam


Respiratory Exam: Decreased Breath Sounds, NORMAL BREATHING PATTERN





- Cardiovascular Exam


Cardiovascular Exam: REGULAR RHYTHM





- GI/Abdominal Exam


GI & Abdominal Exam: Distended, Tenderness, Diminished Bowel Sounds.  absent: 

Guarding, Rigid, Mass





- Rectal Exam


Rectal Exam: Deferred





- Extremities Exam


Extremities Exam: Normal Inspection





- Neurological Exam


Neurological Exam: Alert, Awake





- Psychiatric Exam


Psychiatric exam: Anxious, Depressed





- Skin


Skin Exam: Dry, Warm





Assessment and Plan


(1) C. difficile colitis


Status: Acute





(2) Acute respiratory failure


Status: Acute





(3) Hyponatremia


Status: Resolved





(4) GERD (gastroesophageal reflux disease)


Status: Chronic





(5) Knee injury


Status: Chronic





(6) Sepsis


Status: Acute





(7) Bladder incontinence


Status: Chronic





(8) Abdominal pain


Status: Acute





(9) Seizure disorder


Status: Chronic

## 2017-03-18 NOTE — CP.CCUPN
CCU Subjective





- Physician Review


Events Since Last Encounter (Free Text): 





03/18/17 18:49


77 year old female with a past medical history of HTN, arthritis, anxiety, 

seizures, back problems and gall bladder disease presents with C-diff colitis. 

Patient on antibiotics and monitored in critical care setting


Remains intubated on ventilatory support 


Response to vocal command


Afebrile








CCU Objective





- Vital Signs / Intake & Output


Vital Signs (Last 4 hours): 


Vital Signs











  Temp


 


 03/18/17 16:00  98.1 F











Intake and Output (Last 8hrs): 


 Intake & Output











 03/18/17 03/18/17 03/18/17





 06:59 14:59 22:59


 


Intake Total 1386.2 1291.2 484.2


 


Output Total 147 170 70


 


Balance 1239.2 1121.2 414.2


 


Intake:   


 


  Intake, IV Amount 1191.2 1091.2 409.2


 


    Right Proximal Port 1100 1000 375


 


    Right Medial Port 91.2 91.2 34.2





    Internal Jugular   


 


  Oral 0 0 


 


  Tube Feeding 195 200 75


 


Output:   


 


  Urine 145 170 70


 


    Urethral (Gonzalez) 145 170 70


 


  Stool 2 0 


 


Other:   


 


  # Bowel Movements 1  














- Physical Exam


Head: Positive for: Atraumatic, Normocephalic


Pupils: Positive for: PERRL


Extroacular Muscles: Positive for: EOMI


Conjunctiva: Positive for: Normal


Ears: Positive for: Normal


Mouth: Positive for: Moist Mucous Membranes


Respiratory/Chest: Positive for: Clear to Auscultation.  Negative for: Wheezes


Cardiovascular: Positive for: Normal S1, S2


Abdomen: Positive for: Distention, Normal Bowel Sounds.  Negative for: 

Peritoneal Signs


Upper Extremity: Positive for: NORMAL PULSES


Lower Extremity: Negative for: Edema


Skin: Positive for: Warm, Dry


Psychiatric: Positive for: Other (sedated at this time).  Negative for: Alert





- Medications


Active Medications: 


Active Medications











Generic Name Dose Route Start Last Admin





  Trade Name Freq  PRN Reason Stop Dose Admin


 


Acetaminophen  650 mg 03/15/17 04:18 03/16/17 09:14





  Tylenol 325mg Tab  PO   650 mg





  Q4 PRN   Administration





  Fever >100.4 F   


 


Famotidine  20 mg 03/15/17 10:00 03/18/17 10:42





  Pepcid  IVP   20 mg





  Q12 [facility]   Administration


 


Heparin Sodium (Porcine)  5,000 units 03/15/17 06:00 03/18/17 15:35





  Heparin  SC   5,000 units





  Q8 [facility]   Administration


 


Metronidazole  100 mls @ 100 mls/hr 03/15/17 06:45 03/18/17 15:35





  Flagyl  IVPB   100 mls/hr





  Q8 [facility]   Administration


 


Norepinephrine Bitartrate 4 mg  254 mls @ 15.24 mls/hr 03/15/17 09:08 03/18/17 

10:44





  / Sodium Chloride  IV   11.43 mls/hr





  .[id number] PRN   Administration





  TITRATE PER MD ORDER   





  Protocol   





  4 MCG/MIN   


 


Sodium Chloride  1,000 mls @ 125 mls/hr 03/15/17 13:06 03/18/17 13:01





  Sodium Chloride 0.9%  IV   Not Given





  .Q8H [facility]   


 


Midazolam HCl  1 mg 03/17/17 21:53 03/17/17 22:30





  Versed Inj  IV   1 mg





  Q6H PRN   Administration





  Sedation   


 


Rifaximin  550 mg 03/17/17 10:45 03/18/17 18:35





  Xifaxan  PO   550 mg





  BID [facility]   Administration


 


Vancomycin HCl  500 mg 03/15/17 10:00 03/18/17 18:34





  Vancocin (Oral Or Rectal Use)  PO   500 mg





  QID [facility]   Administration


 


Vancomycin HCl  500 mg 03/18/17 10:00 03/18/17 18:34





  Vancocin (Oral Or Rectal Use)  IN   500 mg





  TID [facility]   Administration














- Patient Studies


Lab Studies: 


 Lab Studies











  03/18/17 03/18/17 03/18/17 Range/Units





  12:52 06:36 04:35 


 


WBC   46.6 H*   (4.8-10.8)  K/uL


 


RBC   4.02   (3.80-5.20)  Mil/uL


 


Hgb   11.1   (11.0-16.0)  g/dL


 


Hct   34.0   (34.0-47.0)  %


 


MCV   84.7   (81.0-99.0)  fL


 


MCH   27.6   (27.0-31.0)  pg


 


MCHC   32.6 L   (33.0-37.0)  g/dL


 


RDW   15.8 H   (11.5-14.5)  %


 


Plt Count   432 H   (130-400)  K/uL


 


MPV   7.9   (7.2-11.7)  fL


 


Neut % (Auto)   90.2 H   (50.0-75.0)  %


 


Lymph % (Auto)   4.3 L   (20.0-40.0)  %


 


Mono % (Auto)   4.7   (0.0-10.0)  %


 


Eos % (Auto)   0.6   (0.0-4.0)  %


 


Baso % (Auto)   0.2   (0.0-2.0)  %


 


Neut #   42.0 H   (1.8-7.0)  K/uL


 


Lymph #   2.0   (1.0-4.3)  K/uL


 


Mono #   2.2 H   (0.0-0.8)  K/uL


 


Eos #   0.3   (0.0-0.7)  K/uL


 


Baso #   0.1   (0.0-0.2)  K/uL


 


Neutrophils % (Manual)   60   (50-75)  %


 


Band Neutrophils %   26 H*   (0-2)  %


 


Lymphocytes % (Manual)   2 L   (20-40)  %


 


Reactive Lymphs %   4 H   (0-0)  %


 


Monocytes % (Manual)   8   (0-10)  %


 


Toxic Granulation   Present   


 


Platelet Estimate   Slightly increased H   (NORMAL)  


 


Large Platelets   Present   


 


Polychromasia   Slight   


 


Hypochromasia (manual)   Slight   


 


Poikilocytosis (manual   Slight   


 


Anisocytosis (manual)   Slight   


 


Ovalocytes   Slight   


 


Rand Cells   Slight   


 


Puncture Site    Rr  


 


pCO2    25 L  (35-45)  mm/Hg


 


pO2    153 H  ()  mm/Hg


 


HCO3    15.5 L  (21-28)  mmol/L


 


ABG pH    7.31 L  (7.35-7.45)  


 


ABG Total CO2    13.4 L  (22-28)  mmol/L


 


ABG O2 Saturation    99.7 H  (95-98)  %


 


ABG Base Excess    -12.1 L  (-2.0-3.0)  mmol/L


 


ABG Hemoglobin    11.0 L  (11.7-17.4)  g/dL


 


ABG Carboxyhemoglobin    1.6 H  (0.5-1.5)  %


 


POC ABG HHb (Measured)    0.3  (0.0-5.0)  %


 


ABG Methemoglobin    1.3  (0.0-3.0)  %


 


Martin Test    Pos  


 


A-a O2 Difference    101.0  mm/Hg


 


Respiratory Index    0.7  


 


Hgb O2 Saturation    96.8  (95.0-98.0)  %


 


Mechanical Rate    14  


 


FiO2    40.0  %


 


Tidal Volume    500  


 


PEEP    5  


 


Sodium   131 L   (132-148)  mmol/L


 


Potassium   4.0   (3.6-5.2)  mmol/L


 


Chloride   107   ()  mmol/L


 


Carbon Dioxide   15 L   (22-30)  mmol/L


 


Anion Gap   13   (10-20)  


 


BUN   28 H   (7-17)  mg/dL


 


Creatinine   1.0   (0.7-1.2)  MG/DL


 


Est GFR (African Amer)   > 60   


 


Est GFR (Non-Af Amer)   53   


 


Random Glucose   109 H   ()  mg/dL


 


Lactic Acid  0.9    (0.7-2.1)  mmol/L


 


Calcium   6.9 L   (8.6-10.4)  mg/dl


 


Phosphorus   2.9   (2.5-4.5)  mg/dL


 


Magnesium   2.2   (1.6-2.3)  mg/dL


 


Total Bilirubin   0.3   (0.2-1.3)  mg/dL


 


AST   26   (14-36)  U/L


 


ALT   26   (9-52)  U/L


 


Alkaline Phosphatase   80   ()  U/L


 


Total Protein   4.7 L   (6.3-8.3)  g/dL


 


Albumin   1.8 L   (3.5-5.0)  g/dL


 


Globulin   2.9   (2.2-3.9)  gm/dL


 


Albumin/Globulin Ratio   0.6 L   (1.0-2.1)  








 Laboratory Results - last 24 hr











  03/18/17 03/18/17 03/18/17





  04:35 06:36 12:52


 


WBC   46.6 H* 


 


RBC   4.02 


 


Hgb   11.1 


 


Hct   34.0 


 


MCV   84.7 


 


MCH   27.6 


 


MCHC   32.6 L 


 


RDW   15.8 H 


 


Plt Count   432 H 


 


MPV   7.9 


 


Neut % (Auto)   90.2 H 


 


Lymph % (Auto)   4.3 L 


 


Mono % (Auto)   4.7 


 


Eos % (Auto)   0.6 


 


Baso % (Auto)   0.2 


 


Neut #   42.0 H 


 


Lymph #   2.0 


 


Mono #   2.2 H 


 


Eos #   0.3 


 


Baso #   0.1 


 


Neutrophils % (Manual)   60 


 


Band Neutrophils %   26 H* 


 


Lymphocytes % (Manual)   2 L 


 


Reactive Lymphs %   4 H 


 


Monocytes % (Manual)   8 


 


Toxic Granulation   Present 


 


Platelet Estimate   Slightly increased H 


 


Large Platelets   Present 


 


Polychromasia   Slight 


 


Hypochromasia (manual)   Slight 


 


Poikilocytosis (manual   Slight 


 


Anisocytosis (manual)   Slight 


 


Ovalocytes   Slight 


 


Mila Cells   Slight 


 


Puncture Site  Rr  


 


pCO2  25 L  


 


pO2  153 H  


 


HCO3  15.5 L  


 


ABG pH  7.31 L  


 


ABG Total CO2  13.4 L  


 


ABG O2 Saturation  99.7 H  


 


ABG Base Excess  -12.1 L  


 


ABG Hemoglobin  11.0 L  


 


ABG Carboxyhemoglobin  1.6 H  


 


POC ABG HHb (Measured)  0.3  


 


ABG Methemoglobin  1.3  


 


Martin Test  Pos  


 


A-a O2 Difference  101.0  


 


Respiratory Index  0.7  


 


Hgb O2 Saturation  96.8  


 


Mechanical Rate  14  


 


FiO2  40.0  


 


Tidal Volume  500  


 


PEEP  5  


 


Sodium   131 L 


 


Potassium   4.0 


 


Chloride   107 


 


Carbon Dioxide   15 L 


 


Anion Gap   13 


 


BUN   28 H 


 


Creatinine   1.0 


 


Est GFR ( Amer)   > 60 


 


Est GFR (Non-Af Amer)   53 


 


Random Glucose   109 H 


 


Lactic Acid    0.9


 


Calcium   6.9 L 


 


Phosphorus   2.9 


 


Magnesium   2.2 


 


Total Bilirubin   0.3 


 


AST   26 


 


ALT   26 


 


Alkaline Phosphatase   80 


 


Total Protein   4.7 L 


 


Albumin   1.8 L 


 


Globulin   2.9 


 


Albumin/Globulin Ratio   0.6 L 














Review of Systems





- Review of Systems


Systems not reviewed;Unavailable: Intubated





Critical Care Progress Note





- Ventilator Checklist


Head of Bed 30 Degrees: Yes


Daily Sedation Vacation: Yes


Daily Spontaneous Breathing Trial: Yes





- Vent Settings


MODE:: PRVC





Assessment/Plan


(1) Acute respiratory failure


Current Visit: Yes   Status: Acute


Comment: wean as tolerated


Continue treatment for C. difficile colitis


Condition: Improving











(2) C. difficile colitis


Current Visit: Yes   Status: Acute

## 2017-03-19 NOTE — CP.PCM.PN
Subjective





- Date & Time of Evaluation


Date of Evaluation: 03/19/17


Time of Evaluation: 16:05





- Subjective


Subjective: 


INFECTIOUS DISEASE ICU # 6 PROGRESS NOTE


JAGDEEP JUÁREZ MD, FACP


3/19/2017





CHART REVIEWED


PT EXCMAINED


CASE DISCUSSED WITH FAMILY AND STAFF





AWAKE AND ALERT STILL A LITTLE ACIDOTIC BUT BREATHI G BETTER


DECREASED BREATH SOUNDS BILATERALLY


ABD NO REBOUND


EXT NO CCE





SLOW RESPONCE WITH PO'/MN VANCOMYCIN.





IF FECAL TRANSPLANT IS CONSIDERED PLEASE ARRANGE SAME.








Objective





- Vital Signs/Intake and Output


Vital Signs (last 24 hours): 


 











Temp Pulse Resp BP Pulse Ox


 


 98.4 F   93 H  26 H  127/71   98 


 


 03/19/17 12:00  03/19/17 15:02  03/19/17 15:02  03/19/17 15:02  03/19/17 15:02








Intake and Output: 


 











 03/19/17 03/19/17





 06:59 18:59


 


Intake Total 2145.2 1704.0


 


Output Total 385 450


 


Balance 1760.2 1254.0














- Medications


Medications: 


 Current Medications





Acetaminophen (Tylenol 325mg Tab)  650 mg PO Q4 PRN


   PRN Reason: Fever >100.4 F


   Last Admin: 03/16/17 09:14 Dose:  650 mg


Famotidine (Pepcid)  20 mg IVP Q12 [facility]


   Last Admin: 03/19/17 09:39 Dose:  20 mg


Heparin Sodium (Porcine) (Heparin)  5,000 units SC Q8 [facility]


   Last Admin: 03/19/17 13:34 Dose:  5,000 units


Metronidazole (Flagyl)  100 mls @ 100 mls/hr IVPB Q8 [facility]


   Last Admin: 03/19/17 13:34 Dose:  100 mls/hr


Norepinephrine Bitartrate 4 mg (/ Sodium Chloride)  254 mls @ 15.24 mls/hr IV 

.[id number] PRN; Protocol; 4 MCG/MIN


   PRN Reason: TITRATE PER MD ORDER


   Last Titration: 03/19/17 08:00 Dose:  1 mcg/min


Sodium Chloride (Sodium Chloride 0.9%)  1,000 mls @ 75 mls/hr IV .[id number] [facility]


Midazolam HCl (Versed Inj)  1 mg IV Q6H PRN


   PRN Reason: Sedation


   Last Admin: 03/17/17 22:30 Dose:  1 mg


Rifaximin (Xifaxan)  550 mg PO BID [facility]


   Last Admin: 03/19/17 09:39 Dose:  550 mg


Vancomycin HCl (Vancocin (Oral Or Rectal Use))  500 mg PO QID [facility]


   Last Admin: 03/19/17 13:37 Dose:  500 mg


Vancomycin HCl (Vancocin (Oral Or Rectal Use))  500 mg [place] TID [facility]


   Last Admin: 03/19/17 13:38 Dose:  500 mg











- Labs


Labs: 


 





 03/19/17 06:28 





 03/19/17 06:28 





 











PT  14.8 SECONDS (9.7-12.2)  H  03/15/17  01:01    


 


INR  1.3   03/15/17  01:01    


 


APTT  37 SECONDS (21-34)  H  03/15/17  01:01    














- Constitutional


Appears: Non-toxic, Older Than Stated Age, Chronically Ill





- Head Exam


Head Exam: ATRAUMATIC





- Eye Exam


Eye Exam: Normal appearance





- ENT Exam


ENT Exam: Mucous Membranes Dry





- Neck Exam


Neck Exam: absent: Meningismus, Tenderness





- Respiratory Exam


Respiratory Exam: Decreased Breath Sounds, Clear to Ausculation Bilateral, 

NORMAL BREATHING PATTERN





- Cardiovascular Exam


Cardiovascular Exam: REGULAR RHYTHM





- GI/Abdominal Exam


GI & Abdominal Exam: Tenderness, Diminished Bowel Sounds, Hypoactive Bowel 

Sounds.  absent: Rigid, Organomegaly, Pulsatile Mass, Rebound





- Rectal Exam


Rectal Exam: Deferred





Assessment and Plan


(1) C. difficile colitis


Status: Acute





(2) Acute respiratory failure


Status: Acute





(3) Hyponatremia


Status: Resolved





(4) GERD (gastroesophageal reflux disease)


Status: Suspected





(5) Knee injury


Status: Chronic





(6) Sepsis


Status: Acute





(7) Bladder incontinence


Status: Chronic





(8) Abdominal pain


Status: Acute





(9) Seizure disorder


Status: Chronic

## 2017-03-19 NOTE — CP.PCM.PN
Subjective





- Date & Time of Evaluation


Date of Evaluation: 03/19/17


Time of Evaluation: 12:12





- Subjective


Subjective: 


pt is on cpap


doing ok


but still elevated wbc


no fever


diarrhea noted


low albumin


feeding on


continue the current treatment





Objective





- Vital Signs/Intake and Output


Vital Signs (last 24 hours): 


 











Temp Pulse Resp BP Pulse Ox


 


 98 F   88   26 H  124/63   98 


 


 03/19/17 08:00  03/19/17 11:02  03/19/17 11:02  03/19/17 11:02  03/19/17 11:02








Intake and Output: 


 











 03/19/17 03/19/17





 06:59 18:59


 


Intake Total 2145.2 994.0


 


Output Total 385 250


 


Balance 1760.2 744.0














- Medications


Medications: 


 Current Medications





Acetaminophen (Tylenol 325mg Tab)  650 mg PO Q4 PRN


   PRN Reason: Fever >100.4 F


   Last Admin: 03/16/17 09:14 Dose:  650 mg


Albumin Human (Albumin Human 25% (12.5 Gm/50 Ml))  12.5 gm IV Q5H Pending sale to [facility]


   Stop: 03/19/17 15:46


   Last Admin: 03/19/17 10:59 Dose:  12.5 gm


Famotidine (Pepcid)  20 mg IVP Q12 [facility]


   Last Admin: 03/19/17 09:39 Dose:  20 mg


Heparin Sodium (Porcine) (Heparin)  5,000 units SC Q8 [facility]


   Last Admin: 03/19/17 05:46 Dose:  5,000 units


Metronidazole (Flagyl)  100 mls @ 100 mls/hr IVPB Q8 [facility]


   Last Admin: 03/19/17 05:45 Dose:  100 mls/hr


Norepinephrine Bitartrate 4 mg (/ Sodium Chloride)  254 mls @ 15.24 mls/hr IV 

.[id number] PRN; Protocol; 4 MCG/MIN


   PRN Reason: TITRATE PER MD ORDER


   Last Titration: 03/19/17 08:00 Dose:  1 mcg/min


Sodium Chloride (Sodium Chloride 0.9%)  1,000 mls @ 125 mls/hr IV .Q8H ARIELLE


   Last Admin: 03/19/17 05:47 Dose:  125 mls/hr


Midazolam HCl (Versed Inj)  1 mg IV Q6H PRN


   PRN Reason: Sedation


   Last Admin: 03/17/17 22:30 Dose:  1 mg


Rifaximin (Xifaxan)  550 mg PO BID Pending sale to [facility]


   Last Admin: 03/19/17 09:39 Dose:  550 mg


Vancomycin HCl (Vancocin (Oral Or Rectal Use))  500 mg PO QID Pending sale to [facility]


   Last Admin: 03/19/17 09:39 Dose:  500 mg


Vancomycin HCl (Vancocin (Oral Or Rectal Use))  500 mg CO TID Pending sale to [facility]


   Last Admin: 03/19/17 10:09 Dose:  500 mg











- Labs


Labs: 


 





 03/19/17 06:28 





 03/19/17 06:28 





 











PT  14.8 SECONDS (9.7-12.2)  H  03/15/17  01:01    


 


INR  1.3   03/15/17  01:01    


 


APTT  37 SECONDS (21-34)  H  03/15/17  01:01

## 2017-03-19 NOTE — CP.CCUPN
CCU Subjective





- Physician Review


Events Since Last Encounter (Free Text): 





03/19/17 16:42


Alert and able to communicate using communication board, clinically stable.





CCU Objective





- Vital Signs / Intake & Output


Vital Signs (Last 4 hours): 


Vital Signs











  Pulse Resp BP Pulse Ox


 


 03/19/17 15:02  93 H  26 H  127/71  98


 


 03/19/17 15:00  90  26 H   97


 


 03/19/17 14:02  88  22  113/55 L  99


 


 03/19/17 14:00  88  20   99


 


 03/19/17 13:02  93 H  22  118/60  99


 


 03/19/17 13:00  94 H  23   99











Intake and Output (Last 8hrs): 


 Intake & Output











 03/19/17 03/19/17 03/19/17





 06:59 14:59 22:59


 


Intake Total 1399.6 1589.0 115


 


Output Total 260 400 50


 


Balance 1139.6 1189.0 65


 


Weight 205 lb 4.006 oz  


 


Intake:   


 


  Intake, IV Amount 1164.6 1019.0 75


 


    Right Proximal Port 1100 1000 75


 


    Right Medial Port 64.6 19.0 0





    Internal Jugular   


 


  Tube Feeding 235 300 40


 


  Albumin  50 


 


  Other  220 


 


Output:   


 


  Urine 260 400 50


 


    Urethral (Cuellar) 260 400 50


 


  Stool  0 


 


Other:   


 


  # Bowel Movements  1 














- Physical Exam


Head: Positive for: Atraumatic, Normocephalic


Pupils: Positive for: PERRL


Extroacular Muscles: Positive for: EOMI


Conjunctiva: Positive for: Normal


Ears: Positive for: Normal


Mouth: Positive for: Moist Mucous Membranes


Respiratory/Chest: Positive for: Clear to Auscultation.  Negative for: Wheezes


Cardiovascular: Positive for: Normal S1, S2


Abdomen: Positive for: Distention, Normal Bowel Sounds.  Negative for: 

Peritoneal Signs


Upper Extremity: Positive for: NORMAL PULSES


Lower Extremity: Negative for: Edema


Skin: Positive for: Warm, Dry


Psychiatric: Positive for: Other (sedated at this time).  Negative for: Alert





- Medications


Active Medications: 


Active Medications











Generic Name Dose Route Start Last Admin





  Trade Name Freq  PRN Reason Stop Dose Admin


 


Acetaminophen  650 mg 03/15/17 04:18 03/16/17 09:14





  Tylenol 325mg Tab  PO   650 mg





  Q4 PRN   Administration





  Fever >100.4 F   


 


Famotidine  20 mg 03/15/17 10:00 03/19/17 09:39





  Pepcid  IVP   20 mg





  Q12 ARIELLE   Administration


 


Heparin Sodium (Porcine)  5,000 units 03/15/17 06:00 03/19/17 13:34





  Heparin  SC   5,000 units





  Q8 ARIELLE   Administration


 


Metronidazole  100 mls @ 100 mls/hr 03/15/17 06:45 03/19/17 13:34





  Flagyl  IVPB   100 mls/hr





  Q8 ARIELLE   Administration


 


Norepinephrine Bitartrate 4 mg  254 mls @ 15.24 mls/hr 03/15/17 09:08 03/19/17 

08:00





  / Sodium Chloride  IV   1 mcg/min





  .[id number] PRN   Titration





  TITRATE PER MD ORDER   





  Protocol   





  4 MCG/MIN   


 


Sodium Chloride  1,000 mls @ 75 mls/hr 03/19/17 15:30  





  Sodium Chloride 0.9%  IV   





  .[id number] ARIELLE   


 


Midazolam HCl  1 mg 03/17/17 21:53 03/17/17 22:30





  Versed Inj  IV   1 mg





  Q6H PRN   Administration





  Sedation   


 


Rifaximin  550 mg 03/17/17 10:45 03/19/17 09:39





  Xifaxan  PO   550 mg





  BID ARIELLE   Administration


 


Vancomycin HCl  500 mg 03/15/17 10:00 03/19/17 13:37





  Vancocin (Oral Or Rectal Use)  PO   500 mg





  QID ARIELLE   Administration


 


Vancomycin HCl  500 mg 03/18/17 10:00 03/19/17 13:38





  Vancocin (Oral Or Rectal Use)  CT   500 mg





  TID ARIELLE   Administration














- Patient Studies


Lab Studies: 


 Lab Studies











  03/19/17 03/19/17 Range/Units





  06:28 05:07 


 


WBC  46.8 H*   (4.8-10.8)  K/uL


 


RBC  3.99   (3.80-5.20)  Mil/uL


 


Hgb  10.9 L   (11.0-16.0)  g/dL


 


Hct  33.7 L   (34.0-47.0)  %


 


MCV  84.4   (81.0-99.0)  fL


 


MCH  27.4   (27.0-31.0)  pg


 


MCHC  32.4 L   (33.0-37.0)  g/dL


 


RDW  15.9 H   (11.5-14.5)  %


 


Plt Count  438 H   (130-400)  K/uL


 


MPV  8.1   (7.2-11.7)  fL


 


Neut % (Auto)  86.5 H   (50.0-75.0)  %


 


Lymph % (Auto)  5.9 L   (20.0-40.0)  %


 


Mono % (Auto)  7.1   (0.0-10.0)  %


 


Eos % (Auto)  0.3   (0.0-4.0)  %


 


Baso % (Auto)  0.2   (0.0-2.0)  %


 


Neut #  40.5 H   (1.8-7.0)  K/uL


 


Lymph #  2.7   (1.0-4.3)  K/uL


 


Mono #  3.3 H   (0.0-0.8)  K/uL


 


Eos #  0.1   (0.0-0.7)  K/uL


 


Baso #  0.1   (0.0-0.2)  K/uL


 


Neutrophils % (Manual)  52   (50-75)  %


 


Band Neutrophils %  23 H*   (0-2)  %


 


Lymphocytes % (Manual)  5 L   (20-40)  %


 


Monocytes % (Manual)  6   (0-10)  %


 


Metamyelocytes %  5 H   (0-0)  %


 


Myelocytes %  9 H   (0-0)  %


 


Toxic Granulation  Present   


 


Platelet Estimate  Slightly increased H   (NORMAL)  


 


Large Platelets  Present   


 


Poikilocytosis (manual  Slight   


 


Anisocytosis (manual)  Slight   


 


Tear Drop Cells  Slight   


 


Puncture Site   Rr  


 


pCO2   27 L  (35-45)  mm/Hg


 


pO2   123 H  ()  mm/Hg


 


HCO3   15.8 L  (21-28)  mmol/L


 


ABG pH   7.30 L  (7.35-7.45)  


 


ABG Total CO2   14.1 L  (22-28)  mmol/L


 


ABG O2 Saturation   99.7 H  (95-98)  %


 


ABG Base Excess   -11.7 L  (-2.0-3.0)  mmol/L


 


ABG Hemoglobin   10.8 L  (11.7-17.4)  g/dL


 


ABG Carboxyhemoglobin   1.8 H  (0.5-1.5)  %


 


POC ABG HHb (Measured)   0.3  (0.0-5.0)  %


 


ABG Methemoglobin   1.4  (0.0-3.0)  %


 


Martin Test   Pos  


 


A-a O2 Difference   128.0  mm/Hg


 


Respiratory Index   1.0  


 


Hgb O2 Saturation   96.5  (95.0-98.0)  %


 


Mechanical Rate   14  


 


FiO2   40.0  %


 


Tidal Volume   500  


 


PEEP   5  


 


Sodium  132   (132-148)  mmol/L


 


Potassium  4.1   (3.6-5.2)  mmol/L


 


Chloride  107   ()  mmol/L


 


Carbon Dioxide  13 L   (22-30)  mmol/L


 


Anion Gap  16   (10-20)  


 


BUN  26 H   (7-17)  mg/dL


 


Creatinine  0.7   (0.7-1.2)  MG/DL


 


Est GFR (African Amer)  > 60   


 


Est GFR (Non-Af Amer)  > 60   


 


Random Glucose  85   ()  mg/dL


 


Calcium  7.2 L   (8.6-10.4)  mg/dl


 


Phosphorus  3.0   (2.5-4.5)  mg/dL


 


Magnesium  2.2   (1.6-2.3)  mg/dL


 


Total Bilirubin  0.2   (0.2-1.3)  mg/dL


 


AST  38 H D   (14-36)  U/L


 


ALT  16   (9-52)  U/L


 


Alkaline Phosphatase  78   ()  U/L


 


Total Protein  4.4 L   (6.3-8.3)  g/dL


 


Albumin  1.9 L   (3.5-5.0)  g/dL


 


Globulin  2.5   (2.2-3.9)  gm/dL


 


Albumin/Globulin Ratio  0.8 L   (1.0-2.1)  








 Laboratory Results - last 24 hr











  03/19/17 03/19/17





  05:07 06:28


 


WBC   46.8 H*


 


RBC   3.99


 


Hgb   10.9 L


 


Hct   33.7 L


 


MCV   84.4


 


MCH   27.4


 


MCHC   32.4 L


 


RDW   15.9 H


 


Plt Count   438 H


 


MPV   8.1


 


Neut % (Auto)   86.5 H


 


Lymph % (Auto)   5.9 L


 


Mono % (Auto)   7.1


 


Eos % (Auto)   0.3


 


Baso % (Auto)   0.2


 


Neut #   40.5 H


 


Lymph #   2.7


 


Mono #   3.3 H


 


Eos #   0.1


 


Baso #   0.1


 


Neutrophils % (Manual)   52


 


Band Neutrophils %   23 H*


 


Lymphocytes % (Manual)   5 L


 


Monocytes % (Manual)   6


 


Metamyelocytes %   5 H


 


Myelocytes %   9 H


 


Toxic Granulation   Present


 


Platelet Estimate   Slightly increased H


 


Large Platelets   Present


 


Poikilocytosis (manual   Slight


 


Anisocytosis (manual)   Slight


 


Tear Drop Cells   Slight


 


Puncture Site  Rr 


 


pCO2  27 L 


 


pO2  123 H 


 


HCO3  15.8 L 


 


ABG pH  7.30 L 


 


ABG Total CO2  14.1 L 


 


ABG O2 Saturation  99.7 H 


 


ABG Base Excess  -11.7 L 


 


ABG Hemoglobin  10.8 L 


 


ABG Carboxyhemoglobin  1.8 H 


 


POC ABG HHb (Measured)  0.3 


 


ABG Methemoglobin  1.4 


 


Martin Test  Pos 


 


A-a O2 Difference  128.0 


 


Respiratory Index  1.0 


 


Hgb O2 Saturation  96.5 


 


Mechanical Rate  14 


 


FiO2  40.0 


 


Tidal Volume  500 


 


PEEP  5 


 


Sodium   132


 


Potassium   4.1


 


Chloride   107


 


Carbon Dioxide   13 L


 


Anion Gap   16


 


BUN   26 H


 


Creatinine   0.7


 


Est GFR ( Amer)   > 60


 


Est GFR (Non-Af Amer)   > 60


 


Random Glucose   85


 


Calcium   7.2 L


 


Phosphorus   3.0


 


Magnesium   2.2


 


Total Bilirubin   0.2


 


AST   38 H D


 


ALT   16


 


Alkaline Phosphatase   78


 


Total Protein   4.4 L


 


Albumin   1.9 L


 


Globulin   2.5


 


Albumin/Globulin Ratio   0.8 L














Review of Systems





- Review of Systems


Systems not reviewed;Unavailable: Intubated





Critical Care Progress Note





- Ventilator Checklist


Head of Bed 30 Degrees: Yes


Daily Sedation Vacation: Yes


Daily Assessment of Readiness to Wean: Yes


Daily Spontaneous Breathing Trial: Yes


PUD Prophalyxis: Yes


DVT Prophylaxis: Yes





Assessment/Plan


(1) C. difficile colitis


Assessment and plan: 


77 year old female with a past medical history of HTN, arthritis, anxiety, 

seizures, back problems and gall bladder disease presents with C-diff colitis.





Neuro: Alert and following commands, Versed when necessary for agitation.





Pulm: Acute respiratory failure, starting pressure support trials. If patient 

tolerates possible extubation tomorrow.





CV: Septic shock improving, titrating Levophed off, starting albumin drip.





Hem: Anemia of critical illness





Renal: No acute issues, urine output within normal limits. Normal saline, 

decreasing to 75 miles per hour.





Endo: No acute issues





GI: Nothing by mouth, Peptamen at 40.





ID: Severe sepsis from C. difficile colitis, continue Vanco by mouth and Flagyl 

IV.





DVT proph - heparin subcutaneous


GI proph - Pepcid


cuellar for strict I/O's during acute illness


Code status - full code





Crtical Care Time spent 35 minutes


Multi-disciplinary rounds were performed with house staff, nursing, speech 

therapy, respiratory therapy, pharmacy and nutrition with integrated input from 

the primary team/attending and other consulting services.  The documented time 

is cumulative and includes review of patient data/exams/labs/chart review and 

examination of the patient on rounds and throughout the day; time is exclusive 

of any procedures or teaching time.


Current Visit: Yes   Status: Acute

## 2017-03-19 NOTE — RAD
HISTORY:

follow up  



COMPARISON:

Comparison chest dated 03/17/2017. 



FINDINGS:



LUNGS:

Low lung volumes, crowded bronchovascular markings and mild bibasilar 

atelectasis.  There may also be small left questionable tiny left 



PLEURA:

Right-sided effusions. No apparent



CARDIOVASCULAR:

Borderline cardiomegaly.



OSSEOUS STRUCTURES:

No significant abnormalities.



VISUALIZED UPPER ABDOMEN:

Normal.



OTHER FINDINGS:

Pneumothorax in situ ETT, tip of which lies approximately 5.67 cm 

above selma.  Right IJ central venous line with tip in the SVC 

unchanged. NGT is present, the distal aspect of which is poorly 

delineated on this study though does appear to be lie well below EG 

junction.



IMPRESSION:

ETT, NGT and right IJ central venous line unchanged. Low lung volumes 

crowded bronchovascular markings and mild bibasilar atelectasis.  

Questionable small left and tiny right effusion.

## 2017-03-19 NOTE — CP.PCM.PN
Subjective





- Date & Time of Evaluation


Date of Evaluation: 03/19/17


Time of Evaluation: 11:40





- Subjective


Subjective: 


F/u colitis.


Having diarrhea.  Pt is more awake.


No report of RB, melena, chills, SZ, hematuria, HA, cough, hematemesis, 

hemoptysis, tremor, jaundice, pruritis





Objective





- Vital Signs/Intake and Output


Vital Signs (last 24 hours): 


 











Temp Pulse Resp BP Pulse Ox


 


 98 F   88   26 H  124/63   98 


 


 03/19/17 08:00  03/19/17 11:02  03/19/17 11:02  03/19/17 11:02  03/19/17 11:02








Intake and Output: 


 











 03/19/17 03/19/17





 06:59 18:59


 


Intake Total 2145.2 994.0


 


Output Total 385 250


 


Balance 1760.2 744.0














- Medications


Medications: 


 Current Medications





Acetaminophen (Tylenol 325mg Tab)  650 mg PO Q4 PRN


   PRN Reason: Fever >100.4 F


   Last Admin: 03/16/17 09:14 Dose:  650 mg


Albumin Human (Albumin Human 25% (12.5 Gm/50 Ml))  12.5 gm IV Q5H [facility]


   Stop: 03/19/17 15:46


   Last Admin: 03/19/17 10:59 Dose:  12.5 gm


Famotidine (Pepcid)  20 mg IVP Q12 [facility]


   Last Admin: 03/19/17 09:39 Dose:  20 mg


Heparin Sodium (Porcine) (Heparin)  5,000 units SC Q8 [facility]


   Last Admin: 03/19/17 05:46 Dose:  5,000 units


Metronidazole (Flagyl)  100 mls @ 100 mls/hr IVPB Q8 [facility]


   Last Admin: 03/19/17 05:45 Dose:  100 mls/hr


Norepinephrine Bitartrate 4 mg (/ Sodium Chloride)  254 mls @ 15.24 mls/hr IV 

.[id number] PRN; Protocol; 4 MCG/MIN


   PRN Reason: TITRATE PER MD ORDER


   Last Titration: 03/19/17 08:00 Dose:  1 mcg/min


Sodium Chloride (Sodium Chloride 0.9%)  1,000 mls @ 125 mls/hr IV .Q8H [facility]


   Last Admin: 03/19/17 05:47 Dose:  125 mls/hr


Midazolam HCl (Versed Inj)  1 mg IV Q6H PRN


   PRN Reason: Sedation


   Last Admin: 03/17/17 22:30 Dose:  1 mg


Rifaximin (Xifaxan)  550 mg PO BID [facility]


   Last Admin: 03/19/17 09:39 Dose:  550 mg


Vancomycin HCl (Vancocin (Oral Or Rectal Use))  500 mg PO QID [facility]


   Last Admin: 03/19/17 09:39 Dose:  500 mg


Vancomycin HCl (Vancocin (Oral Or Rectal Use))  500 mg NM TID [facility]


   Last Admin: 03/19/17 10:09 Dose:  500 mg











- Labs


Labs: 


 





 03/19/17 06:28 





 03/19/17 06:28 





 











PT  14.8 SECONDS (9.7-12.2)  H  03/15/17  01:01    


 


INR  1.3   03/15/17  01:01    


 


APTT  37 SECONDS (21-34)  H  03/15/17  01:01    














- Constitutional


Appears: Other





- ENT Exam


Additional comments: 


Intubated





- Respiratory Exam


Respiratory Exam: Rhonchi





- Cardiovascular Exam


Cardiovascular Exam: REGULAR RHYTHM





- GI/Abdominal Exam


GI & Abdominal Exam: Distended.  absent: Rebound


Additional comments: 


BSs are hear, softly distended, less tender





- Extremities Exam


Extremities Exam: absent: Calf Tenderness





- Neurological Exam


Neurological Exam: Alert, Awake





- Psychiatric Exam


Psychiatric exam: Normal Mood





- Skin


Skin Exam: Intact





Assessment and Plan


(1) Acute respiratory failure


Status: Acute





(2) Hyponatremia


Assessment & Plan: 


Better


Status: Resolved





(3) Sepsis


Assessment & Plan: 


WBC and bands slowly improving.


Status: Acute





(4) Abdominal pain


Status: Acute





(5) GERD (gastroesophageal reflux disease)


Status: Suspected





(6) Colitis


Assessment & Plan: 


c difficile.


Continue flagyl and vanco.  Xifaxin.


Consider fecal transplant


Status: Acute





(7) Diarrhea


Assessment & Plan: 


c difficile.


Status: Acute

## 2017-03-20 NOTE — RAD
PROCEDURE:  CHEST RADIOGRAPH, 1 VIEW



HISTORY:

intubated



COMPARISON:

None available.



FINDINGS:



LUNGS:

In situ ETT, tip of which lies approximately with 5 cm above selma.  

Right IJ central venous line with tip in the SVC unchanged. In situ 

NGT, the distal aspect of which is poorly seen. .



Bibasilar atelectasis and or infiltrates and small bilateral 

effusions felt present left larger than right.



PLEURA:

As above.  No apparent pneumothorax



CARDIOVASCULAR:

Normal.



OSSEOUS STRUCTURES:

No significant abnormalities.



VISUALIZED UPPER ABDOMEN:

Eggshell calcification left upper quadrant the abdomen unchanged



OTHER FINDINGS:

None. 



IMPRESSION:

Support lines and tubes as above. Mild bibasilar atelectasis and 

small effusions felt to be present left larger than right

## 2017-03-20 NOTE — CP.PCM.PN
Subjective





- Date & Time of Evaluation


Date of Evaluation: 03/20/17


Time of Evaluation: 18:43





- Subjective


Subjective: 


INFECTIOUS DISEASE ICU PROGRESS NOTE


JAGDEEP JUÁREZ MD, FACP


ICU/CCU #6


3/20/2017





CHART REVIEWED


PT EXAMINED


CASE DISCUSSED








PT EXTUBATED


MORE AWAKE AND ALERT


C. DIFF NOTED STILL REPEAT LABS


WATCH CBC AND CMP








Objective





- Vital Signs/Intake and Output


Vital Signs (last 24 hours): 


 











Temp Pulse Resp BP Pulse Ox


 


 98.4 F   96 H  27 H  146/74   98 


 


 03/20/17 16:00  03/20/17 18:09  03/20/17 18:09  03/20/17 18:09  03/20/17 18:09








Intake and Output: 


 











 03/20/17 03/20/17





 06:59 18:59


 


Intake Total 1430 1760


 


Output Total 510 650


 


Balance 920 1110














- Medications


Medications: 


 Current Medications





Acetaminophen (Tylenol 325mg Tab)  650 mg PO Q4 PRN


   PRN Reason: Fever >100.4 F


   Last Admin: 03/16/17 09:14 Dose:  650 mg


Famotidine (Pepcid)  20 mg IVP Q12 [facility]


   Last Admin: 03/20/17 09:05 Dose:  20 mg


Heparin Sodium (Porcine) (Heparin)  5,000 units SC Q12 [facility]


   Last Admin: 03/20/17 09:05 Dose:  5,000 units


Metronidazole (Flagyl)  100 mls @ 100 mls/hr IVPB Q8 [facility]


   Last Admin: 03/20/17 14:18 Dose:  100 mls/hr


Sodium Bicarbonate 100 meq/ (Sodium Chloride)  1,000 mls @ 75 mls/hr IV 

.[id number] [facility]


   Stop: 03/20/17 22:19


   Last Admin: 03/20/17 16:06 Dose:  Not Given


Rifaximin (Xifaxan)  550 mg PO BID [facility]


   Last Admin: 03/20/17 18:23 Dose:  550 mg


Vancomycin HCl (Vancocin (Oral Or Rectal Use))  500 mg PO QID [facility]


   Last Admin: 03/20/17 18:24 Dose:  500 mg


Vancomycin HCl (Vancocin (Oral Or Rectal Use))  500 mg AL TID [facility]


   Last Admin: 03/20/17 18:23 Dose:  500 mg











- Labs


Labs: 


 





 03/20/17 06:21 





 03/20/17 06:21 





 











PT  14.8 SECONDS (9.7-12.2)  H  03/15/17  01:01    


 


INR  1.3   03/15/17  01:01    


 


APTT  37 SECONDS (21-34)  H  03/15/17  01:01    














- Constitutional


Appears: Non-toxic, Older Than Stated Age, Chronically Ill





- Head Exam


Head Exam: ATRAUMATIC





- Eye Exam


Eye Exam: Normal appearance





- ENT Exam


ENT Exam: Mucous Membranes Moist





- Neck Exam


Neck Exam: Normal Inspection





- Respiratory Exam


Respiratory Exam: Decreased Breath Sounds, Clear to Ausculation Bilateral, 

NORMAL BREATHING PATTERN





- Cardiovascular Exam


Cardiovascular Exam: REGULAR RHYTHM





- GI/Abdominal Exam


GI & Abdominal Exam: Soft, Tenderness, Diminished Bowel Sounds, Hypoactive 

Bowel Sounds.  absent: Organomegaly, Rebound





- Rectal Exam


Rectal Exam: Deferred





Assessment and Plan


(1) C. difficile colitis


Status: Acute





(2) Acute respiratory failure


Status: Acute





(3) Hyponatremia


Status: Resolved





(4) GERD (gastroesophageal reflux disease)


Status: Suspected





(5) Knee injury


Status: Chronic





(6) Sepsis


Status: Acute





(7) Bladder incontinence


Status: Chronic





(8) Abdominal pain


Status: Acute





(9) Seizure disorder


Status: Chronic

## 2017-03-20 NOTE — CP.CCUPN
<Hadley Spencer - Last Filed: 03/20/17 15:26>





CCU Subjective





- Physician Review


Subjective (Free Text): 


03/16/17 17:58


Patient seen at bedside and is in no acute distress.  Patient is intubated Vent 

settings ( FiO2 50% RR 14 PEEP 5 ). Isolation precautions in place due to 

c. diff colitis findings. Patient cannot comply to an ROS at this time due to 

intubation and sedation. Family bedside and informed of management.. 





03/17/17 12:11





Patient seen at beside and is in no acute distress. Patient was placed on a 

trial of CPAP with PS 10 and PEEP of 5. Patient tolerated CPAP well. Patient 

was extubated at 12pm with a follow up ABG at 1 pm. Patient remains on 8 mcg/

min of levophed. No ROS obtained at this time secondary to somnolence.





03/20/17 15:02


Pt seen and examined in no acute distress.  Vitals stable. Unable to obtain ROS 

at initial time of evaluation because patient was still intubated. Patient








CCU Objective





- Vital Signs / Intake & Output


Vital Signs (Last 4 hours): 


Vital Signs











  Pulse Resp BP Pulse Ox


 


 03/20/17 06:09  98 H  26 H  142/75  95


 


 03/20/17 06:00  101 H  28 H   93 L


 


 03/20/17 05:03  101 H  31 H  135/69  96


 


 03/20/17 05:00  99 H  30 H   97


 


 03/20/17 04:02  92 H  23  120/57 L  95


 


 03/20/17 04:00  91 H  25 H  125/55 L  95











Intake and Output (Last 8hrs): 


 Intake & Output











 03/19/17 03/20/17 03/20/17





 22:59 06:59 14:59


 


Intake Total 1115 945 115


 


Output Total 475 310 40


 


Balance 640 635 75


 


Weight  205 lb 0.478 oz 


 


Intake:   


 


  Intake, IV Amount 625 625 75


 


    Right Proximal Port 625 625 75


 


    Right Medial Port 0  





    Internal Jugular   


 


  Tube Feeding 320 320 40


 


  Albumin 50  


 


  Other 120  


 


Output:   


 


  Urine 475 310 40


 


    Urethral (Gonzalez) 475 310 40


 


Other:   


 


  # Bowel Movements 1  














- Physical Exam


Head: Positive for: Atraumatic, Normocephalic


Pupils: Positive for: PERRL


Extroacular Muscles: Positive for: EOMI


Conjunctiva: Positive for: Normal


Ears: Positive for: Normal


Mouth: Positive for: Moist Mucous Membranes


Respiratory/Chest: Positive for: Clear to Auscultation.  Negative for: Wheezes


Cardiovascular: Positive for: Normal S1, S2


Abdomen: Positive for: Distention, Normal Bowel Sounds.  Negative for: 

Peritoneal Signs


Upper Extremity: Positive for: NORMAL PULSES


Lower Extremity: Negative for: Edema


Skin: Positive for: Warm, Dry


Psychiatric: Positive for: Other (sedated at this time).  Negative for: Alert





- Medications


Active Medications: 


Active Medications











Generic Name Dose Route Start Last Admin





  Trade Name Freq  PRN Reason Stop Dose Admin


 


Acetaminophen  650 mg 03/15/17 04:18 03/16/17 09:14





  Tylenol 325mg Tab  PO   650 mg





  Q4 PRN   Administration





  Fever >100.4 F   


 


Famotidine  20 mg 03/15/17 10:00 03/19/17 22:00





  Pepcid  IVP   20 mg





  Q12 ARIELLE   Administration


 


Heparin Sodium (Porcine)  5,000 units 03/19/17 22:00 03/19/17 22:00





  Heparin  SC   5,000 units





  Q12 ARIELLE   Administration


 


Metronidazole  100 mls @ 100 mls/hr 03/15/17 06:45 03/20/17 06:00





  Flagyl  IVPB   100 mls/hr





  Q8 ARIELLE   Administration


 


Norepinephrine Bitartrate 4 mg  254 mls @ 15.24 mls/hr 03/15/17 09:08 03/19/17 

11:00





  / Sodium Chloride  IV   0 mcg/min





  .[id number] PRN   Titration





  TITRATE PER MD ORDER   





  Protocol   





  4 MCG/MIN   


 


Sodium Chloride  1,000 mls @ 75 mls/hr 03/19/17 15:30 03/20/17 06:00





  Sodium Chloride 0.9%  IV   75 mls/hr





  [id number] ARIELLE   Administration


 


Midazolam HCl  1 mg 03/17/17 21:53 03/17/17 22:30





  Versed Inj  IV   1 mg





  Q6H PRN   Administration





  Sedation   


 


Rifaximin  550 mg 03/17/17 10:45 03/19/17 17:39





  Xifaxan  PO   550 mg





  BID ARIELLE   Administration


 


Vancomycin HCl  500 mg 03/15/17 10:00 03/19/17 22:00





  Vancocin (Oral Or Rectal Use)  PO   500 mg





  QID ARIELLE   Administration


 


Vancomycin HCl  500 mg 03/18/17 10:00 03/19/17 17:41





  Vancocin (Oral Or Rectal Use)  MD   500 mg





  TID ARIELLE   Administration














- Patient Studies


Lab Studies: 


 Lab Studies











  03/20/17 03/20/17 03/19/17 Range/Units





  06:21 05:11 06:28 


 


WBC  44.7 H*    (4.8-10.8)  K/uL


 


RBC  4.30    (3.80-5.20)  Mil/uL


 


Hgb  11.8    (11.0-16.0)  g/dL


 


Hct  36.7    (34.0-47.0)  %


 


MCV  85.4    (81.0-99.0)  fL


 


MCH  27.4    (27.0-31.0)  pg


 


MCHC  32.1 L    (33.0-37.0)  g/dL


 


RDW  15.9 H    (11.5-14.5)  %


 


Plt Count  460 H    (130-400)  K/uL


 


MPV  8.7    (7.2-11.7)  fL


 


Neut % (Auto)  85.0 H    (50.0-75.0)  %


 


Lymph % (Auto)  7.5 L    (20.0-40.0)  %


 


Mono % (Auto)  6.2    (0.0-10.0)  %


 


Eos % (Auto)  0.6    (0.0-4.0)  %


 


Baso % (Auto)  0.7    (0.0-2.0)  %


 


Neut #  38.0 H    (1.8-7.0)  K/uL


 


Lymph #  3.4    (1.0-4.3)  K/uL


 


Mono #  2.8 H    (0.0-0.8)  K/uL


 


Eos #  0.3    (0.0-0.7)  K/uL


 


Baso #  0.3 H    (0.0-0.2)  K/uL


 


Neutrophils % (Manual)    52  (50-75)  %


 


Band Neutrophils %    23 H*  (0-2)  %


 


Lymphocytes % (Manual)    5 L  (20-40)  %


 


Monocytes % (Manual)    6  (0-10)  %


 


Metamyelocytes %    5 H  (0-0)  %


 


Myelocytes %    9 H  (0-0)  %


 


Toxic Granulation    Present  


 


Platelet Estimate    Slightly increased H  (NORMAL)  


 


Large Platelets    Present  


 


Poikilocytosis (manual    Slight  


 


Anisocytosis (manual)    Slight  


 


Tear Drop Cells    Slight  


 


Puncture Site   Rr   


 


pCO2   31 L   (35-45)  mm/Hg


 


pO2   105 H   ()  mm/Hg


 


HCO3   16.3 L   (21-28)  mmol/L


 


ABG pH   7.28 L   (7.35-7.45)  


 


ABG Total CO2   15.6 L   (22-28)  mmol/L


 


ABG O2 Saturation   99.4 H   (95-98)  %


 


ABG Base Excess   -11.0 L   (-2.0-3.0)  mmol/L


 


ABG Hemoglobin   11.4 L   (11.7-17.4)  g/dL


 


ABG Carboxyhemoglobin   1.6 H   (0.5-1.5)  %


 


POC ABG HHb (Measured)   0.6   (0.0-5.0)  %


 


ABG Methemoglobin   1.0   (0.0-3.0)  %


 


Martin Test   Pos   


 


A-a O2 Difference   141.0   mm/Hg


 


Respiratory Index   1.3   


 


Hgb O2 Saturation   96.8   (95.0-98.0)  %


 


FiO2   40.0   %


 


Pressure Support   10   


 


CPAP   5   


 


Sodium  132    (132-148)  mmol/L


 


Potassium  3.8    (3.6-5.2)  mmol/L


 


Chloride  107    ()  mmol/L


 


Carbon Dioxide  16 L    (22-30)  mmol/L


 


Anion Gap  13    (10-20)  


 


BUN  18 H    (7-17)  mg/dL


 


Creatinine  0.6 L    (0.7-1.2)  MG/DL


 


Est GFR (African Amer)  > 60    


 


Est GFR (Non-Af Amer)  > 60    


 


Random Glucose  89    ()  mg/dL


 


Calcium  7.5 L    (8.6-10.4)  mg/dl


 


Phosphorus  3.5    (2.5-4.5)  mg/dL


 


Magnesium  1.9    (1.6-2.3)  mg/dL


 


Total Bilirubin  0.5    (0.2-1.3)  mg/dL


 


AST  26    (14-36)  U/L


 


ALT  16    (9-52)  U/L


 


Alkaline Phosphatase  62    ()  U/L


 


Total Protein  4.9 L    (6.3-8.3)  g/dL


 


Albumin  2.2 L    (3.5-5.0)  g/dL


 


Globulin  2.7    (2.2-3.9)  gm/dL


 


Albumin/Globulin Ratio  0.8 L    (1.0-2.1)  








 Laboratory Results - last 24 hr











  03/19/17 03/20/17 03/20/17





  06:28 05:11 06:21


 


WBC    44.7 H*


 


RBC    4.30


 


Hgb    11.8


 


Hct    36.7


 


MCV    85.4


 


MCH    27.4


 


MCHC    32.1 L


 


RDW    15.9 H


 


Plt Count    460 H


 


MPV    8.7


 


Neut % (Auto)    85.0 H


 


Lymph % (Auto)    7.5 L


 


Mono % (Auto)    6.2


 


Eos % (Auto)    0.6


 


Baso % (Auto)    0.7


 


Neut #    38.0 H


 


Lymph #    3.4


 


Mono #    2.8 H


 


Eos #    0.3


 


Baso #    0.3 H


 


Neutrophils % (Manual)  52  


 


Band Neutrophils %  23 H*  


 


Lymphocytes % (Manual)  5 L  


 


Monocytes % (Manual)  6  


 


Metamyelocytes %  5 H  


 


Myelocytes %  9 H  


 


Toxic Granulation  Present  


 


Platelet Estimate  Slightly increased H  


 


Large Platelets  Present  


 


Poikilocytosis (manual  Slight  


 


Anisocytosis (manual)  Slight  


 


Tear Drop Cells  Slight  


 


Puncture Site   Rr 


 


pCO2   31 L 


 


pO2   105 H 


 


HCO3   16.3 L 


 


ABG pH   7.28 L 


 


ABG Total CO2   15.6 L 


 


ABG O2 Saturation   99.4 H 


 


ABG Base Excess   -11.0 L 


 


ABG Hemoglobin   11.4 L 


 


ABG Carboxyhemoglobin   1.6 H 


 


POC ABG HHb (Measured)   0.6 


 


ABG Methemoglobin   1.0 


 


Martin Test   Pos 


 


A-a O2 Difference   141.0 


 


Respiratory Index   1.3 


 


Hgb O2 Saturation   96.8 


 


FiO2   40.0 


 


Pressure Support   10 


 


CPAP   5 


 


Sodium    132


 


Potassium    3.8


 


Chloride    107


 


Carbon Dioxide    16 L


 


Anion Gap    13


 


BUN    18 H


 


Creatinine    0.6 L


 


Est GFR ( Amer)    > 60


 


Est GFR (Non-Af Amer)    > 60


 


Random Glucose    89


 


Calcium    7.5 L


 


Phosphorus    3.5


 


Magnesium    1.9


 


Total Bilirubin    0.5


 


AST    26


 


ALT    16


 


Alkaline Phosphatase    62


 


Total Protein    4.9 L


 


Albumin    2.2 L


 


Globulin    2.7


 


Albumin/Globulin Ratio    0.8 L














<Ab Ford - Last Filed: 03/20/17 15:35>





CCU Subjective





- Physician Review


Events Since Last Encounter (Free Text): 





03/20/17 15:34


Patient today doing well.


She got extubated today.


Postextubation patient is doing well.


She has no chest pain or abdominal pain.


Still receiving vancomycin edema.





Vital signs stable chest good air entry regular heart sound nontender abdomen 

edema noted





Labs reviewed


Still WBC elevated.


Chest x-rays nonspecific





Assessment and a condition:


77-year-old female with a history of C. difficile colitis, respiratory failure 

fever sepsis.


Currently doing well.


We will continue the current treatment.


Possible transferred to telemetry if needed





CCU Objective





- Vital Signs / Intake & Output


Vital Signs (Last 4 hours): 


Vital Signs











  Temp Pulse Resp BP Pulse Ox


 


 03/20/17 14:00   87  24   100


 


 03/20/17 13:39   88  23  127/67  98


 


 03/20/17 12:09   87  24  137/67  100


 


 03/20/17 12:00  98.4 F    











Intake and Output (Last 8hrs): 


 Intake & Output











 03/20/17 03/20/17 03/20/17





 06:59 14:59 22:59


 


Intake Total 945 1160 75


 


Output Total 310 420 50


 


Balance 635 740 25


 


Weight 205 lb 0.478 oz  


 


Intake:   


 


  Intake, IV Amount 625 1000 75


 


    Right Distal Port  200 





    Internal Jugular   


 


    Right Proximal Port 625 600 75


 


    Right Medial Port  200 





    Internal Jugular   


 


  Tube Feeding 320 160 0


 


Output:   


 


  Urine 310 420 50


 


    Urethral (Gonzalez) 310 420 50














- Medications


Active Medications: 


Active Medications











Generic Name Dose Route Start Last Admin





  Trade Name Freq  PRN Reason Stop Dose Admin


 


Acetaminophen  650 mg 03/15/17 04:18 03/16/17 09:14





  Tylenol 325mg Tab  PO   650 mg





  Q4 PRN   Administration





  Fever >100.4 F   


 


Famotidine  20 mg 03/15/17 10:00 03/20/17 09:05





  Pepcid  IVP   20 mg





  Q12 ARIELLE   Administration


 


Heparin Sodium (Porcine)  5,000 units 03/19/17 22:00 03/20/17 09:05





  Heparin  SC   5,000 units





  Q12 ARIELLE   Administration


 


Metronidazole  100 mls @ 100 mls/hr 03/15/17 06:45 03/20/17 14:18





  Flagyl  IVPB   100 mls/hr





  Q8 ARIELLE   Administration


 


Sodium Bicarbonate 100 meq/  1,000 mls @ 75 mls/hr 03/20/17 09:00 03/20/17 08:53





  Sodium Chloride  IV   75 mls/hr





  .[id number] ARIELLE   Administration


 


Midazolam HCl  1 mg 03/17/17 21:53 03/17/17 22:30





  Versed Inj  IV   1 mg





  Q6H PRN   Administration





  Sedation   


 


Rifaximin  550 mg 03/17/17 10:45 03/20/17 09:07





  Xifaxan  PO   550 mg





  BID ARIELLE   Administration


 


Vancomycin HCl  500 mg 03/15/17 10:00 03/20/17 14:18





  Vancocin (Oral Or Rectal Use)  PO   Not Given





  QID [facility]   


 


Vancomycin HCl  500 mg 03/18/17 10:00 03/20/17 14:18





  Vancocin (Oral Or Rectal Use)  MD   500 mg





  TID ARIELLE   Administration














- Patient Studies


Lab Studies: 


 Lab Studies











  03/20/17 03/20/17 Range/Units





  06:21 05:11 


 


WBC  44.7 H*   (4.8-10.8)  K/uL


 


RBC  4.30   (3.80-5.20)  Mil/uL


 


Hgb  11.8   (11.0-16.0)  g/dL


 


Hct  36.7   (34.0-47.0)  %


 


MCV  85.4   (81.0-99.0)  fL


 


MCH  27.4   (27.0-31.0)  pg


 


MCHC  32.1 L   (33.0-37.0)  g/dL


 


RDW  15.9 H   (11.5-14.5)  %


 


Plt Count  460 H   (130-400)  K/uL


 


MPV  8.7   (7.2-11.7)  fL


 


Neut % (Auto)  85.0 H   (50.0-75.0)  %


 


Lymph % (Auto)  7.5 L   (20.0-40.0)  %


 


Mono % (Auto)  6.2   (0.0-10.0)  %


 


Eos % (Auto)  0.6   (0.0-4.0)  %


 


Baso % (Auto)  0.7   (0.0-2.0)  %


 


Neut #  38.0 H   (1.8-7.0)  K/uL


 


Lymph #  3.4   (1.0-4.3)  K/uL


 


Mono #  2.8 H   (0.0-0.8)  K/uL


 


Eos #  0.3   (0.0-0.7)  K/uL


 


Baso #  0.3 H   (0.0-0.2)  K/uL


 


Neutrophils % (Manual)  62   (50-75)  %


 


Band Neutrophils %  18 H*   (0-2)  %


 


Lymphocytes % (Manual)  6 L   (20-40)  %


 


Monocytes % (Manual)  7   (0-10)  %


 


Metamyelocytes %  5 H   (0-0)  %


 


Myelocytes %  2 H   (0-0)  %


 


Platelet Estimate  Slightly increased H   (NORMAL)  


 


Anisocytosis (manual)  Slight   


 


Puncture Site   Rr  


 


pCO2   31 L  (35-45)  mm/Hg


 


pO2   105 H  ()  mm/Hg


 


HCO3   16.3 L  (21-28)  mmol/L


 


ABG pH   7.28 L  (7.35-7.45)  


 


ABG Total CO2   15.6 L  (22-28)  mmol/L


 


ABG O2 Saturation   99.4 H  (95-98)  %


 


ABG Base Excess   -11.0 L  (-2.0-3.0)  mmol/L


 


ABG Hemoglobin   11.4 L  (11.7-17.4)  g/dL


 


ABG Carboxyhemoglobin   1.6 H  (0.5-1.5)  %


 


POC ABG HHb (Measured)   0.6  (0.0-5.0)  %


 


ABG Methemoglobin   1.0  (0.0-3.0)  %


 


Martin Test   Pos  


 


A-a O2 Difference   141.0  mm/Hg


 


Respiratory Index   1.3  


 


Hgb O2 Saturation   96.8  (95.0-98.0)  %


 


FiO2   40.0  %


 


Pressure Support   10  


 


CPAP   5  


 


Sodium  132   (132-148)  mmol/L


 


Potassium  3.8   (3.6-5.2)  mmol/L


 


Chloride  107   ()  mmol/L


 


Carbon Dioxide  16 L   (22-30)  mmol/L


 


Anion Gap  13   (10-20)  


 


BUN  18 H   (7-17)  mg/dL


 


Creatinine  0.6 L   (0.7-1.2)  MG/DL


 


Est GFR (African Amer)  > 60   


 


Est GFR (Non-Af Amer)  > 60   


 


Random Glucose  89   ()  mg/dL


 


Calcium  7.5 L   (8.6-10.4)  mg/dl


 


Phosphorus  3.5   (2.5-4.5)  mg/dL


 


Magnesium  1.9   (1.6-2.3)  mg/dL


 


Total Bilirubin  0.5   (0.2-1.3)  mg/dL


 


AST  26   (14-36)  U/L


 


ALT  16   (9-52)  U/L


 


Alkaline Phosphatase  62   ()  U/L


 


Total Protein  4.9 L   (6.3-8.3)  g/dL


 


Albumin  2.2 L   (3.5-5.0)  g/dL


 


Globulin  2.7   (2.2-3.9)  gm/dL


 


Albumin/Globulin Ratio  0.8 L   (1.0-2.1)  








 Laboratory Results - last 24 hr











  03/20/17 03/20/17





  05:11 06:21


 


WBC   44.7 H*


 


RBC   4.30


 


Hgb   11.8


 


Hct   36.7


 


MCV   85.4


 


MCH   27.4


 


MCHC   32.1 L


 


RDW   15.9 H


 


Plt Count   460 H


 


MPV   8.7


 


Neut % (Auto)   85.0 H


 


Lymph % (Auto)   7.5 L


 


Mono % (Auto)   6.2


 


Eos % (Auto)   0.6


 


Baso % (Auto)   0.7


 


Neut #   38.0 H


 


Lymph #   3.4


 


Mono #   2.8 H


 


Eos #   0.3


 


Baso #   0.3 H


 


Neutrophils % (Manual)   62


 


Band Neutrophils %   18 H*


 


Lymphocytes % (Manual)   6 L


 


Monocytes % (Manual)   7


 


Metamyelocytes %   5 H


 


Myelocytes %   2 H


 


Platelet Estimate   Slightly increased H


 


Anisocytosis (manual)   Slight


 


Puncture Site  Rr 


 


pCO2  31 L 


 


pO2  105 H 


 


HCO3  16.3 L 


 


ABG pH  7.28 L 


 


ABG Total CO2  15.6 L 


 


ABG O2 Saturation  99.4 H 


 


ABG Base Excess  -11.0 L 


 


ABG Hemoglobin  11.4 L 


 


ABG Carboxyhemoglobin  1.6 H 


 


POC ABG HHb (Measured)  0.6 


 


ABG Methemoglobin  1.0 


 


Martin Test  Pos 


 


A-a O2 Difference  141.0 


 


Respiratory Index  1.3 


 


Hgb O2 Saturation  96.8 


 


FiO2  40.0 


 


Pressure Support  10 


 


CPAP  5 


 


Sodium   132


 


Potassium   3.8


 


Chloride   107


 


Carbon Dioxide   16 L


 


Anion Gap   13


 


BUN   18 H


 


Creatinine   0.6 L


 


Est GFR ( Amer)   > 60


 


Est GFR (Non-Af Amer)   > 60


 


Random Glucose   89


 


Calcium   7.5 L


 


Phosphorus   3.5


 


Magnesium   1.9


 


Total Bilirubin   0.5


 


AST   26


 


ALT   16


 


Alkaline Phosphatase   62


 


Total Protein   4.9 L


 


Albumin   2.2 L


 


Globulin   2.7


 


Albumin/Globulin Ratio   0.8 L

## 2017-03-20 NOTE — CP.PCM.PN
Subjective





- Date & Time of Evaluation


Date of Evaluation: 03/20/17


Time of Evaluation: 15:56





- Subjective


Subjective: 


CC: Follow up C Diff Colitis





Loose BMs after Vanco enemas administered.


Denies abdominal pain.


Extubated, in good spirits





Objective





- Vital Signs/Intake and Output


Vital Signs (last 24 hours): 


 











Temp Pulse Resp BP Pulse Ox


 


 98.4 F   87   24   127/67   100 


 


 03/20/17 12:00  03/20/17 14:00  03/20/17 14:00  03/20/17 13:39  03/20/17 14:00








Intake and Output: 


 











 03/20/17 03/20/17





 06:59 18:59


 


Intake Total 1430 1235


 


Output Total 510 470


 


Balance 920 765














- Medications


Medications: 


 Current Medications





Acetaminophen (Tylenol 325mg Tab)  650 mg PO Q4 PRN


   PRN Reason: Fever >100.4 F


   Last Admin: 03/16/17 09:14 Dose:  650 mg


Famotidine (Pepcid)  20 mg IVP Q12 [facility]


   Last Admin: 03/20/17 09:05 Dose:  20 mg


Heparin Sodium (Porcine) (Heparin)  5,000 units SC Q12 [facility]


   Last Admin: 03/20/17 09:05 Dose:  5,000 units


Metronidazole (Flagyl)  100 mls @ 100 mls/hr IVPB Q8 [facility]


   Last Admin: 03/20/17 14:18 Dose:  100 mls/hr


Sodium Bicarbonate 100 meq/ (Sodium Chloride)  1,000 mls @ 75 mls/hr IV 

.[id number] [facility]


   Stop: 03/20/17 22:19


Rifaximin (Xifaxan)  550 mg PO BID [facility]


   Last Admin: 03/20/17 09:07 Dose:  550 mg


Vancomycin HCl (Vancocin (Oral Or Rectal Use))  500 mg PO QID [facility]


   Last Admin: 03/20/17 14:18 Dose:  Not Given


Vancomycin HCl (Vancocin (Oral Or Rectal Use))  500 mg MN TID [facility]


   Last Admin: 03/20/17 14:18 Dose:  500 mg











- Labs


Labs: 


 





 03/20/17 06:21 





 03/20/17 06:21 





 











PT  14.8 SECONDS (9.7-12.2)  H  03/15/17  01:01    


 


INR  1.3   03/15/17  01:01    


 


APTT  37 SECONDS (21-34)  H  03/15/17  01:01    














- Constitutional


Appears: Chronically Ill





- Head Exam


Head Exam: NORMOCEPHALIC





- Eye Exam


Eye Exam: absent: Scleral icterus





- Respiratory Exam


Respiratory Exam: NORMAL BREATHING PATTERN





- Cardiovascular Exam


Cardiovascular Exam: REGULAR RHYTHM





- GI/Abdominal Exam


GI & Abdominal Exam: Distended, Soft, Hyperactive Bowel Sounds.  absent: 

Guarding, Tenderness, Rebound





Assessment and Plan


(1) Acute respiratory failure


Assessment & Plan: 


Extubated


Status: Acute





(2) Sepsis


Assessment & Plan: 


Improving


Status: Acute





(3) GERD (gastroesophageal reflux disease)


Assessment & Plan: 


Stable


Status: Suspected





(4) C. difficile colitis


Assessment & Plan: 


Severe. Improving on PO Vanco, rectal Vanco, IV Flagyl.


Would hold off transplant for now.


Status: Acute

## 2017-03-20 NOTE — RAD
HISTORY:

colitis  



COMPARISON:

Comparison made with prior CT scan of the abdomen and pelvis dated 

03/15/2017.



FINDINGS:



BOWEL:

The current study demonstrates distended air-filled loops of bowel in 

the right lower quadrant and left upper abdomen likely representing 

distended cecum and distal distal transverse and descending colon 

consistent with this patient's history of colitis best visualized on 

prior CT scan. Note that this supine view is limited to assess for 

free intraperitoneal air 



BONES:

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



OTHER FINDINGS:

Again noted is a large rounded eggshell like calcification left upper 

quadrant of the abdomen. 



IMPRESSION:

Distended air-filled loops of colon as above consistent with this 

patient's history of colitis as above. Note that the supine view is 

limited to assess for free intraperitoneal air

## 2017-03-21 NOTE — [email or web address]
Subjective





- Date & Time of Evaluation


Date of Evaluation: 03/21/17


Time of Evaluation: 20:49





- Subjective


Subjective: 


pt is feeling good


able to sit up


no chest pain


she ate well today


diarrhea noted





labs still not improving


WBC still high


will check the labs in am





PT





Objective





- Vital Signs/Intake and Output


Vital Signs (last 24 hours): 


 











Temp Pulse Resp BP Pulse Ox


 


 98.4 F   99 H  18   174/83 H  96 


 


 03/21/17 19:00  03/21/17 19:00  03/21/17 19:00  03/21/17 19:00  03/21/17 15:13








Intake and Output: 


 











 03/21/17 03/22/17





 18:59 06:59


 


Intake Total 775 240


 


Output Total 190 


 


Balance 585 240














- Medications


Medications: 


 Current Medications





Acetaminophen (Tylenol 325mg Tab)  650 mg PO Q4 PRN


   PRN Reason: Fever >100.4 F


   Last Admin: 03/16/17 09:14 Dose:  650 mg


Famotidine (Pepcid)  20 mg IVP Q12 [facility]


   Last Admin: 03/21/17 09:50 Dose:  20 mg


Heparin Sodium (Porcine) (Heparin)  5,000 units SC Q12 [facility]


   Last Admin: 03/21/17 09:50 Dose:  5,000 units


Metronidazole (Flagyl)  100 mls @ 100 mls/hr IVPB Q8 [facility]


   Last Admin: 03/21/17 15:47 Dose:  100 mls/hr


Rifaximin (Xifaxan)  550 mg PO BID [facility]


   Last Admin: 03/21/17 17:55 Dose:  550 mg


Saccharomyces Boulardii (Florastor)  250 mg PO TID [facility]


Vancomycin HCl (Vancocin (Oral Or Rectal Use))  500 mg PO QID [facility]


   Last Admin: 03/21/17 17:55 Dose:  500 mg


Vancomycin HCl (Vancocin (Oral Or Rectal Use))  500 mg OK TID [facility]


   Last Admin: 03/21/17 15:48 Dose:  500 mg











- Labs


Labs: 


 





 03/21/17 06:30 





 03/21/17 06:30 





 











PT  14.8 SECONDS (9.7-12.2)  H  03/15/17  01:01    


 


INR  1.3   03/15/17  01:01    


 


APTT  37 SECONDS (21-34)  H  03/15/17  01:01

## 2017-03-21 NOTE — CP.PCM.PN
Subjective





- Date & Time of Evaluation


Date of Evaluation: 03/21/17


Time of Evaluation: 08:37





- Subjective


Subjective: 


F/U diarrhea.


Pt is more awake and is looking better.  Sitting in chair and answers questions.


Still diarrhea.  Worse post enema.


Denies fever, chills, SZ, CP, RB, melena, hematuria, hemoptysis





Objective





- Vital Signs/Intake and Output


Vital Signs (last 24 hours): 


 











Temp Pulse Resp BP Pulse Ox


 


 97.9 F   103 H  26 H  146/78   97 


 


 03/21/17 08:00  03/21/17 07:39  03/21/17 07:39  03/21/17 07:39  03/21/17 07:39








Intake and Output: 


 











 03/21/17 03/21/17





 06:59 18:59


 


Intake Total 1810 75


 


Output Total 465 40


 


Balance 1345 35














- Medications


Medications: 


 Current Medications





Acetaminophen (Tylenol 325mg Tab)  650 mg PO Q4 PRN


   PRN Reason: Fever >100.4 F


   Last Admin: 03/16/17 09:14 Dose:  650 mg


Famotidine (Pepcid)  20 mg IVP Q12 [facility]


   Last Admin: 03/20/17 22:21 Dose:  20 mg


Heparin Sodium (Porcine) (Heparin)  5,000 units SC Q12 [facility]


   Last Admin: 03/20/17 22:21 Dose:  5,000 units


Metronidazole (Flagyl)  100 mls @ 100 mls/hr IVPB Q8 [facility]


   Last Admin: 03/21/17 06:02 Dose:  100 mls/hr


Sodium Bicarbonate 100 meq/ (Sodium Chloride)  1,000 mls @ 75 mls/hr IV 

[id number] [facility]


   Stop: 03/21/17 13:39


   Last Admin: 03/20/17 23:19 Dose:  75 mls/hr


Rifaximin (Xifaxan)  550 mg PO BID [facility]


   Last Admin: 03/20/17 18:23 Dose:  550 mg


Vancomycin HCl (Vancocin (Oral Or Rectal Use))  500 mg PO QID [facility]


   Last Admin: 03/20/17 22:22 Dose:  500 mg


Vancomycin HCl (Vancocin (Oral Or Rectal Use))  500 mg [place] TID [facility]


   Last Admin: 03/20/17 18:23 Dose:  500 mg











- Labs


Labs: 


 





 03/21/17 06:30 





 03/21/17 06:30 





 











PT  14.8 SECONDS (9.7-12.2)  H  03/15/17  01:01    


 


INR  1.3   03/15/17  01:01    


 


APTT  37 SECONDS (21-34)  H  03/15/17  01:01    














- Constitutional


Appears: Non-toxic





- Neck Exam


Neck Exam: absent: Tenderness





- Respiratory Exam


Respiratory Exam: Clear to Ausculation Bilateral





- Cardiovascular Exam


Cardiovascular Exam: RRR





- GI/Abdominal Exam


GI & Abdominal Exam: Distended, Soft, Normal Bowel Sounds.  absent: Guarding, 

Rebound


Additional comments: 


softer.  less tender.





- Extremities Exam


Extremities Exam: absent: Calf Tenderness





- Neurological Exam


Neurological Exam: Alert, Awake, Oriented x3





Assessment and Plan


(1) Acute respiratory failure


Assessment & Plan: 


better


Status: Acute





(2) Sepsis


Assessment & Plan: 


Decreasing WBC


Status: Acute





(3) Abdominal pain


Assessment & Plan: 


colitis.  c diff.


Status: Acute





(4) GERD (gastroesophageal reflux disease)


Status: Suspected





(5) Colitis


Assessment & Plan: 


c difficile.  


Continue meds.


Status: Acute





(6) Diarrhea


Status: Acute

## 2017-03-21 NOTE — CP.CCUPN
CCU Subjective





- Physician Review


Subjective (Free Text): 


03/16/17 17:58


Patient seen at bedside and is in no acute distress.  Patient is intubated Vent 

settings ( FiO2 50% RR 14 PEEP 5 ). Isolation precautions in place due to 

c. diff colitis findings. Patient cannot comply to an ROS at this time due to 

intubation and sedation. Family bedside and informed of management.. 





03/17/17 12:11





Patient seen at beside and is in no acute distress. Patient was placed on a 

trial of CPAP with PS 10 and PEEP of 5. Patient tolerated CPAP well. Patient 

was extubated at 12pm with a follow up ABG at 1 pm. Patient remains on 8 mcg/

min of levophed. No ROS obtained at this time secondary to somnolence.





03/20/17 15:02


Pt seen and examined in no acute distress.  Vitals stable. Unable to obtain ROS 

at initial time of evaluation because patient was still intubated. Patient








03/21/17 14:22


Patient seen and examined in no acute distress. Patient was extubated yesterday 

and on 3 L nc cannula satting well. Nursing reports no acute events overnight. 

Patient OOB to chair and responding well to questions. Patient still has 

diarrhea, which has been worse since after enema. Patient denies subjective 

fevers or chills, nausea, vomiting, constipation, paresthesias or headaches at 

this time.

















CCU Objective





- Vital Signs / Intake & Output


Vital Signs (Last 4 hours): 


Vital Signs











  Pulse Resp BP Pulse Ox


 


 03/21/17 07:21    148/84 


 


 03/21/17 07:00  102 H  22   95


 


 03/21/17 05:00  98 H  20   85 L


 


 03/21/17 04:51  95 H  15   96


 


 03/21/17 04:39  95 H  17  153/74 H  100











Intake and Output (Last 8hrs): 


 Intake & Output











 03/20/17 03/21/17 03/21/17





 22:59 06:59 14:59


 


Intake Total 1360 1050 75


 


Output Total 385 310 40


 


Balance 975 740 35


 


Weight  200 lb 9.93 oz 


 


Intake:   


 


  Intake, IV Amount 700 700 75


 


    Right Distal Port 100 100 





    Internal Jugular   


 


    Right Proximal Port 600 600 75


 


  Oral 360 350 0


 


  Tube Feeding 300  


 


Output:   


 


  Urine 385 310 40


 


    Urethral (Gonzalez) 385 310 40


 


Other:   


 


  # Bowel Movements 1 1 














- Physical Exam


Head: Positive for: Atraumatic, Normocephalic


Pupils: Positive for: PERRL


Extroacular Muscles: Positive for: EOMI


Conjunctiva: Positive for: Normal


Ears: Positive for: Normal


Mouth: Positive for: Moist Mucous Membranes


Respiratory/Chest: Positive for: Clear to Auscultation.  Negative for: Wheezes


Cardiovascular: Positive for: Regular Rate and Rhythm, Normal S1, S2


Abdomen: Positive for: Normal Bowel Sounds.  Negative for: Tenderness, 

Peritoneal Signs


Upper Extremity: Positive for: NORMAL PULSES


Lower Extremity: Negative for: Edema


Skin: Positive for: Warm, Dry


Psychiatric: Positive for: Oriented x 3, Normal Insight, Normal Concentration.  

Negative for: Alert





- Medications


Active Medications: 


Active Medications











Generic Name Dose Route Start Last Admin





  Trade Name Freq  PRN Reason Stop Dose Admin


 


Acetaminophen  650 mg 03/15/17 04:18 03/16/17 09:14





  Tylenol 325mg Tab  PO   650 mg





  Q4 PRN   Administration





  Fever >100.4 F   


 


Famotidine  20 mg 03/15/17 10:00 03/20/17 22:21





  Pepcid  IVP   20 mg





  Q12 [facility]   Administration


 


Heparin Sodium (Porcine)  5,000 units 03/19/17 22:00 03/20/17 22:21





  Heparin  SC   5,000 units





  Q12 [facility]   Administration


 


Metronidazole  100 mls @ 100 mls/hr 03/15/17 06:45 03/21/17 06:02





  Flagyl  IVPB   100 mls/hr





  Q8 [facility]   Administration


 


Sodium Bicarbonate 100 meq/  1,000 mls @ 75 mls/hr 03/20/17 23:00 03/20/17 23:19





  Sodium Chloride  IV 03/21/17 13:39  75 mls/hr





  [id number] [facility]   Administration


 


Rifaximin  550 mg 03/17/17 10:45 03/20/17 18:23





  Xifaxan  PO   550 mg





  BID [facility]   Administration


 


Vancomycin HCl  500 mg 03/15/17 10:00 03/20/17 22:22





  Vancocin (Oral Or Rectal Use)  PO   500 mg





  QID [facility]   Administration


 


Vancomycin HCl  500 mg 03/18/17 10:00 03/20/17 18:23





  Vancocin (Oral Or Rectal Use)  [facility]   500 mg





  TID [facility]   Administration














- Patient Studies


Lab Studies: 


 Lab Studies











  03/21/17 03/20/17 Range/Units





  06:30 06:21 


 


WBC  43.5 H*   (4.8-10.8)  K/uL


 


RBC  4.37   (3.80-5.20)  Mil/uL


 


Hgb  11.8   (11.0-16.0)  g/dL


 


Hct  36.8   (34.0-47.0)  %


 


MCV  84.2   (81.0-99.0)  fL


 


MCH  27.1   (27.0-31.0)  pg


 


MCHC  32.2 L   (33.0-37.0)  g/dL


 


RDW  15.8 H   (11.5-14.5)  %


 


Plt Count  532 H   (130-400)  K/uL


 


MPV  8.2   (7.2-11.7)  fL


 


Neut % (Auto)  80.7 H   (50.0-75.0)  %


 


Lymph % (Auto)  8.9 L   (20.0-40.0)  %


 


Mono % (Auto)  9.6   (0.0-10.0)  %


 


Eos % (Auto)  0.4   (0.0-4.0)  %


 


Baso % (Auto)  0.4   (0.0-2.0)  %


 


Neut #  35.1 H   (1.8-7.0)  K/uL


 


Lymph #  3.9   (1.0-4.3)  K/uL


 


Mono #  4.2 H   (0.0-0.8)  K/uL


 


Eos #  0.2   (0.0-0.7)  K/uL


 


Baso #  0.2   (0.0-0.2)  K/uL


 


Neutrophils % (Manual)   62  (50-75)  %


 


Band Neutrophils %   18 H*  (0-2)  %


 


Lymphocytes % (Manual)   6 L  (20-40)  %


 


Monocytes % (Manual)   7  (0-10)  %


 


Metamyelocytes %   5 H  (0-0)  %


 


Myelocytes %   2 H  (0-0)  %


 


Platelet Estimate   Slightly increased H  (NORMAL)  


 


Anisocytosis (manual)   Slight  


 


Sodium  133   (132-148)  mmol/L


 


Potassium  3.5 L   (3.6-5.2)  mmol/L


 


Chloride  102   ()  mmol/L


 


Carbon Dioxide  21 L   (22-30)  mmol/L


 


Anion Gap  14   (10-20)  


 


BUN  13   (7-17)  mg/dL


 


Creatinine  0.5 L   (0.7-1.2)  MG/DL


 


Est GFR (African Amer)  > 60   


 


Est GFR (Non-Af Amer)  > 60   


 


Random Glucose  85   ()  mg/dL


 


Calcium  7.4 L   (8.6-10.4)  mg/dl


 


Phosphorus  3.8   (2.5-4.5)  mg/dL


 


Magnesium  1.7   (1.6-2.3)  mg/dL


 


Total Bilirubin  0.5   (0.2-1.3)  mg/dL


 


AST  45 H D   (14-36)  U/L


 


ALT  27   (9-52)  U/L


 


Alkaline Phosphatase  57   ()  U/L


 


Total Protein  4.6 L   (6.3-8.3)  g/dL


 


Albumin  2.0 L   (3.5-5.0)  g/dL


 


Globulin  2.5   (2.2-3.9)  gm/dL


 


Albumin/Globulin Ratio  0.8 L   (1.0-2.1)  








 Laboratory Results - last 24 hr











  03/20/17 03/21/17





  06:21 06:30


 


WBC   43.5 H*


 


RBC   4.37


 


Hgb   11.8


 


Hct   36.8


 


MCV   84.2


 


MCH   27.1


 


MCHC   32.2 L


 


RDW   15.8 H


 


Plt Count   532 H


 


MPV   8.2


 


Neut % (Auto)   80.7 H


 


Lymph % (Auto)   8.9 L


 


Mono % (Auto)   9.6


 


Eos % (Auto)   0.4


 


Baso % (Auto)   0.4


 


Neut #   35.1 H


 


Lymph #   3.9


 


Mono #   4.2 H


 


Eos #   0.2


 


Baso #   0.2


 


Neutrophils % (Manual)  62 


 


Band Neutrophils %  18 H* 


 


Lymphocytes % (Manual)  6 L 


 


Monocytes % (Manual)  7 


 


Metamyelocytes %  5 H 


 


Myelocytes %  2 H 


 


Platelet Estimate  Slightly increased H 


 


Anisocytosis (manual)  Slight 


 


Sodium   133


 


Potassium   3.5 L


 


Chloride   102


 


Carbon Dioxide   21 L


 


Anion Gap   14


 


BUN   13


 


Creatinine   0.5 L


 


Est GFR ( Amer)   > 60


 


Est GFR (Non-Af Amer)   > 60


 


Random Glucose   85


 


Calcium   7.4 L


 


Phosphorus   3.8


 


Magnesium   1.7


 


Total Bilirubin   0.5


 


AST   45 H D


 


ALT   27


 


Alkaline Phosphatase   57


 


Total Protein   4.6 L


 


Albumin   2.0 L


 


Globulin   2.5


 


Albumin/Globulin Ratio   0.8 L














Review of Systems





- Review of Systems


Review of Systems: 


as noted in subjective





Critical Care Progress Note





- Nutrition


Nutrition: 


 Nutrition











 Category Date Time Status


 


 Pureed [Dysphagia/Modified Consistency Diet] [DIET] Diets  03/20/17 Breakfast 

Active














Assessment/Plan





- Assessment and Plan (Free Text)


Assessment: 


77 year old female with a past medical history of HTN, arthritis, anxiety, 

seizures, back problems and gall bladder disease presents with fever and C-diff 

colitis. Patient was placed on antibiotic therapy, with signs of improvement. 

Patient medically stable for transfer to the medical  floors.


Plan: 


Neuro: 


Neuro check q4 


03/15 CT Head: no definite acute intracranial abnormality . 


Midazolam 1 mg IV Q6H PRN 





Cardio: 


Maintain systolic BP >110 


Levophed IV 4mg /NS @ 8mcg/min, titrated off 3/19 due to BP 96/60 





Pulm: 


Maintain O2 Sat >92% 


3/17 Vent settings: FiO2 40% CPAP 5/PS10 


3/16 Vent Settings: FiO2 50% RR 14 PEEP 5 , Peak 24 


ABG: 


3/20 05:11 pH 7.28, CO2 31, O2 105, HCO3 16.3, base excess 11.0 


3/19 05:07 pH 7.30, CO2 27, O2 123, HCO3 15.8, base excess 11.7 


3/18 04:35 pH 7.31, CO2 25, O2 123, HCO3 15.8, base excess 12.1 


 


Imaging: 


3/20 CXR: mild bibasilar atelectasis and small effusions felt to be present 

left larger than right. 


3/19 CXR: low lung volumes. Crowed bronchovascular markings and mild bibasilar 

atelectasis 


3/17 CXR: feeding tube above level of diaphragm 


3/16: CXR: No active disease 





Endo: 


Maintain euglycemia 





GI: 


C. diff colitis 


Monitor BM 


03/20 XRAY Abdomen: distal air-filled loops of colon consistent with colitis 


03/15 CT Abdomen/Pelvis: Colitis, indeterminate splenic lesion 





: 


I/Os 3570/1115 = 2455.0 


Monitor I/Os 


Gonzalez is in place 


Maintain Gonzalez care 


Renal: 


BUN/Cr: 13/0.5 


Monitor I/Os 


Daily BMP 


Hypokalemia  repleted 


Sodium bicarb @ 75 cc/hr IV [id number] [facility] 





Heme: 


H&H- 11.8/36.8 


Monitor CBC 





ID: 


Patient appears clinically improved


C diff colitis 


Contact precautions 


Low grade fever 


WBC: 43.5 ( 50.3-->43.6-->46.6>46.8>44.7>43.5) 


Neutrophils: 62 (48>60>52>62>62) 


Bands: 12 (37>26>23>18>12) 


3/16: C-diff toxin Positive 


Daily CBC 


Acetaminophen 650 mg PO Q4 PRN Fever >100.4 F 


Flagyl 100 cc @ 100 cc/hr IVPB Q8 [facility] 


Vancomycin 500 mg PO QID, Vancomycin 500 mg [place] TID 


Rifaximin 550 mg PO BID [facility] 


Metronidazole 100 cc @ 100 cc/hr IVPB Q8 [facility] 





Prophylaxis: 


GI: Pepcid 20 mg IVP Q12 


DVT: Heparin 5,000 units SC Q8

## 2017-03-21 NOTE — CP.PCM.PN
Subjective





- Date & Time of Evaluation


Date of Evaluation: 03/21/17


Time of Evaluation: 21:15





- Subjective


Subjective: 


INFECTIOUS DISEASE PROGRESS NOTE


JAGDEEP JUÁREZ MD, FACP


5T 554


3/21/2017





CHART REVIEWED


PT EXAMINED


CASE DISCUSSED





PT IS A CANDIDATE FOR 'HARSHAD PROTOCOL' FOR RECURRENT /DIFFICULT TO TREAT C. 

DIFF.


ON HER LAST ADMISSION DESPITE NEGATIVE C. DIFF'S I TREATED HER ID EMPIRICALLY 

SINCE IT MADE THERAPEUTIC SENSE, AND SHE RESPONDED ACROSS THE BOARD.


I DON'T KNOW WHAT OCCURRED AT [facility].


ERGO, WILL RE-EVALAUTE CONCURRENTLY.





Objective





- Vital Signs/Intake and Output


Vital Signs (last 24 hours): 


 











Temp Pulse Resp BP Pulse Ox


 


 98.4 F   99 H  18   174/83 H  96 


 


 03/21/17 19:00  03/21/17 19:00  03/21/17 19:00  03/21/17 19:00  03/21/17 15:13








Intake and Output: 


 











 03/21/17 03/22/17





 18:59 06:59


 


Intake Total 775 240


 


Output Total 190 


 


Balance 585 240














- Medications


Medications: 


 Current Medications





Acetaminophen (Tylenol 325mg Tab)  650 mg PO Q4 PRN


   PRN Reason: Fever >100.4 F


   Last Admin: 03/16/17 09:14 Dose:  650 mg


Famotidine (Pepcid)  20 mg IVP Q12 [facility]


   Last Admin: 03/21/17 09:50 Dose:  20 mg


Heparin Sodium (Porcine) (Heparin)  5,000 units SC Q12 [facility]


   Last Admin: 03/21/17 09:50 Dose:  5,000 units


Metronidazole (Flagyl)  100 mls @ 100 mls/hr IVPB Q8 [facility]


   Last Admin: 03/21/17 15:47 Dose:  100 mls/hr


Rifaximin (Xifaxan)  550 mg PO BID [facility]


   Last Admin: 03/21/17 17:55 Dose:  550 mg


Saccharomyces Boulardii (Florastor)  250 mg PO TID [facility]


Vancomycin HCl (Vancocin (Oral Or Rectal Use))  500 mg PO QID [facility]


   Last Admin: 03/21/17 17:55 Dose:  500 mg


Vancomycin HCl (Vancocin (Oral Or Rectal Use))  500 mg [place] TID [facility]


   Last Admin: 03/21/17 15:48 Dose:  500 mg











- Labs


Labs: 


 





 03/21/17 06:30 





 03/21/17 06:30 





 











PT  14.8 SECONDS (9.7-12.2)  H  03/15/17  01:01    


 


INR  1.3   03/15/17  01:01    


 


APTT  37 SECONDS (21-34)  H  03/15/17  01:01    














- Constitutional


Appears: Non-toxic, No Acute Distress





- Head Exam


Head Exam: NORMAL INSPECTION





- Eye Exam


Eye Exam: Normal appearance


Pupil Exam: PERRL





- ENT Exam


ENT Exam: Mucous Membranes Moist





- Neck Exam


Neck Exam: Normal Inspection





- Respiratory Exam


Respiratory Exam: Decreased Breath Sounds, Clear to Ausculation Bilateral, 

NORMAL BREATHING PATTERN





- Cardiovascular Exam


Cardiovascular Exam: REGULAR RHYTHM





- GI/Abdominal Exam


GI & Abdominal Exam: Soft, Normal Bowel Sounds.  absent: Tenderness





- Rectal Exam


Rectal Exam: Deferred





-  Exam


 Exam: NORMAL INSPECTION





- Neurological Exam


Neurological Exam: Alert


Additional comments: 


RESTING





- Skin


Skin Exam: Intact, Warm





Assessment and Plan


(1) C. difficile colitis


Status: Acute





(2) Acute respiratory failure


Assessment & Plan: 


POSSIBLE RECURRENT C. DIFF, TO CONSIDER HARSHAD PROTOCOL.


Status: Acute





(3) GERD (gastroesophageal reflux disease)


Status: Suspected





(4) Knee injury


Status: Chronic





(5) Sepsis


Status: Acute





(6) Bladder incontinence


Status: Chronic





(7) Abdominal pain


Status: Acute





(8) Seizure disorder


Status: Chronic

## 2017-03-22 NOTE — CP.PCM.PN
Subjective





- Date & Time of Evaluation


Date of Evaluation: 03/22/17


Time of Evaluation: 12:06





- Subjective


Subjective: 


CC: Follow up C Difficile Colitis





Gets watery BMs, brown, also getting Vanco via enema. WBC decreasing (improving

) and no abdominal pain. 


C/O urinary retention








Objective





- Vital Signs/Intake and Output


Vital Signs (last 24 hours): 


 











Temp Pulse Resp BP Pulse Ox


 


 97.8 F   98 H  20   118/71   95 


 


 03/22/17 08:05  03/22/17 08:05  03/22/17 08:05  03/22/17 08:05  03/22/17 08:05








Intake and Output: 


 











 03/22/17 03/22/17





 06:59 18:59


 


Intake Total 440 


 


Balance 440 














- Medications


Medications: 


 Current Medications





Acetaminophen (Tylenol 325mg Tab)  650 mg PO Q4 PRN


   PRN Reason: Fever >100.4 F


   Last Admin: 03/16/17 09:14 Dose:  650 mg


Famotidine (Pepcid)  20 mg IVP Q12 [facility]


   Last Admin: 03/22/17 10:42 Dose:  20 mg


Heparin Sodium (Porcine) (Heparin)  5,000 units SC Q12 [facility]


   Last Admin: 03/22/17 11:42 Dose:  5,000 units


Metronidazole (Flagyl)  100 mls @ 100 mls/hr IVPB Q8 [facility]


   Last Admin: 03/22/17 05:38 Dose:  100 mls/hr


Rifaximin (Xifaxan)  550 mg PO BID [facility]


   Last Admin: 03/22/17 10:43 Dose:  550 mg


Saccharomyces Boulardii (Florastor)  250 mg PO TID [facility]


   Last Admin: 03/22/17 10:41 Dose:  250 mg


Vancomycin HCl (Vancocin (Oral Or Rectal Use))  500 mg PO QID [facility]


   Last Admin: 03/22/17 10:42 Dose:  500 mg


Vancomycin HCl (Vancocin (Oral Or Rectal Use))  500 mg [place] TID [facility]


   Last Admin: 03/22/17 10:37 Dose:  500 mg











- Labs


Labs: 


 





 03/22/17 06:41 





 03/22/17 06:41 





 











PT  14.8 SECONDS (9.7-12.2)  H  03/15/17  01:01    


 


INR  1.3   03/15/17  01:01    


 


APTT  37 SECONDS (21-34)  H  03/15/17  01:01    














- Constitutional


Appears: No Acute Distress, Chronically Ill





- Head Exam


Head Exam: NORMOCEPHALIC





- Eye Exam


Eye Exam: absent: Scleral icterus





- Neck Exam


Neck Exam: absent: Thyromegaly





- Respiratory Exam


Respiratory Exam: Clear to Ausculation Bilateral





- Cardiovascular Exam


Cardiovascular Exam: REGULAR RHYTHM





- GI/Abdominal Exam


GI & Abdominal Exam: Distended, Soft, Hyperactive Bowel Sounds.  absent: 

Guarding, Tenderness, Mass, Rebound





- Extremities Exam


Additional comments: 


Bilat LE pitting edema





Assessment and Plan


(1) Acute respiratory failure


Assessment & Plan: 


Resolved


Status: Acute





(2) Sepsis


Assessment & Plan: 


Improving


Status: Acute





(3) GERD (gastroesophageal reflux disease)


Assessment & Plan: 


Stable


Status: Suspected





(4) C. difficile colitis


Assessment & Plan: 


On Vanco (PO and Enema) + IV Flagyl and Rifaximin, with gradual clinical 

improvement. ID notes read and appreciated.


Status: Acute

## 2017-03-22 NOTE — CP.PCM.PN
Subjective





- Date & Time of Evaluation


Date of Evaluation: 03/22/17


Time of Evaluation: 19:21





- Subjective


Subjective: 


pt was doing well this am


suddenly she started to have sob and cough and mucous


pt was sweating 


tachycardia 


tachypnea noted





immedietly pt was placed on BIPAP


vitals noted


140/mt


sinus


123/70





IV lasix 40mg given


placed on bipap


cuellar will be done if no improvemt will transfer her to ICU





CXR pulmonary edema noted


edema and anasarca noted








Objective





- Vital Signs/Intake and Output


Vital Signs (last 24 hours): 


 











Temp Pulse Resp BP Pulse Ox


 


 97.6 F   144 H  20   126/97 H  98 


 


 03/22/17 15:27  03/22/17 19:11  03/22/17 15:27  03/22/17 19:05  03/22/17 15:27








Intake and Output: 


 











 03/22/17 03/23/17





 18:59 06:59


 


Intake Total 620 


 


Output Total 2 


 


Balance 618 














- Medications


Medications: 


 Current Medications





Acetaminophen (Tylenol 325mg Tab)  650 mg PO Q4 PRN


   PRN Reason: Fever >100.4 F


   Last Admin: 03/16/17 09:14 Dose:  650 mg


Famotidine (Pepcid)  20 mg IVP Q12 [facility]


   Last Admin: 03/22/17 10:42 Dose:  20 mg


Heparin Sodium (Porcine) (Heparin)  5,000 units SC Q12 [facility]


   Last Admin: 03/22/17 11:42 Dose:  5,000 units


Metronidazole (Flagyl)  100 mls @ 100 mls/hr IVPB Q8 [facility]


   Last Admin: 03/22/17 14:59 Dose:  100 mls/hr


Magnesium Sulfate/Dextrose (Magnesium Sulfate 1 Gm/100 Ml D5w)  100 mls @ 300 

mls/hr IVPB Q30M [facility]


   Stop: 03/22/17 20:04


Potassium Chloride (Potassium Chloride 20 Meq/100 Ml)  100 mls @ 50 mls/hr IVPB 

ONCE ONE


   Stop: 03/22/17 21:00


Rifaximin (Xifaxan)  550 mg PO BID [facility]


   Last Admin: 03/22/17 10:43 Dose:  550 mg


Saccharomyces Boulardii (Florastor)  250 mg PO TID [facility]


   Last Admin: 03/22/17 17:53 Dose:  250 mg


Vancomycin HCl (Vancocin (Oral Or Rectal Use))  500 mg PO QID [facility]


   Last Admin: 03/22/17 17:53 Dose:  500 mg











- Labs


Labs: 


 





 03/22/17 06:41 





 03/22/17 06:41 





 











PT  14.8 SECONDS (9.7-12.2)  H  03/15/17  01:01    


 


INR  1.3   03/15/17  01:01    


 


APTT  37 SECONDS (21-34)  H  03/15/17  01:01

## 2017-03-23 NOTE — CP.CCUPN
<Hadley Spencer - Last Filed: 03/23/17 15:54>





CCU Subjective





- Physician Review


Subjective (Free Text): 


03/16/17 17:58


Patient seen at bedside and is in no acute distress.  Patient is intubated Vent 

settings ( FiO2 50% RR 14 PEEP 5 ). Isolation precautions in place due to 

c. diff colitis findings. Patient cannot comply to an ROS at this time due to 

intubation and sedation. Family bedside and informed of management.. 





03/17/17 12:11





Patient seen at beside and is in no acute distress. Patient was placed on a 

trial of CPAP with PS 10 and PEEP of 5. Patient tolerated CPAP well. Patient 

was extubated at 12pm with a follow up ABG at 1 pm. Patient remains on 8 mcg/

min of levophed. No ROS obtained at this time secondary to somnolence.





03/20/17 15:02


Pt seen and examined in no acute distress.  Vitals stable. Unable to obtain ROS 

at initial time of evaluation because patient was still intubated. Patient








03/21/17 14:22


Patient seen and examined in no acute distress. Patient was extubated yesterday 

and on 3 L nc cannula satting well. Nursing reports no acute events overnight. 

Patient OOB to chair and responding well to questions. Patient still has 

diarrhea, which has been worse since after enema. Patient denies subjective 

fevers or chills, nausea, vomiting, constipation, paresthesias or headaches at 

this time.





03/23/17 08:59


Pt seen and examined in no acute distress. Patient currently on BiPAP. Patient 

states that she became short of breath last night. She felt very uncomfortable 

and nursing was alerted.  bedside stated that patient's legs appeared 

very swollen yesterday; decreasing considerably today. Patient admits to some 

diarrhea. She denied any chest pain, palpitations, current dyspnea, subjective 

fevers or chills, nausea, vomiting or constipation at this time.











CCU Objective





- Vital Signs / Intake & Output


Vital Signs (Last 4 hours): 


Vital Signs











  Temp Pulse Resp BP Pulse Ox


 


 03/23/17 08:17     105/48 L 


 


 03/23/17 08:14   100 H   


 


 03/23/17 08:00  97.5 F L  99 H  19  97/36 L  100


 


 03/23/17 05:06   100 H   











Intake and Output (Last 8hrs): 


 Intake & Output











 03/22/17 03/23/17 03/23/17





 22:59 06:59 14:59


 


Intake Total 540 260 


 


Output Total 250 670 105


 


Balance 290 -410 -105


 


Weight 200 lb  


 


Intake:   


 


  Intake, IV Amount 300 200 


 


    Right Proximal Port 100 0 


 


    Right Medial Port 100 200 





    Internal Jugular   


 


    Right External Jugular 100  


 


  Oral 240 60 


 


Output:   


 


  Urine 250 670 105


 


    Urethral (Gonzalez) 250 670 105


 


Other:   


 


  # Voids   


 


    Urethral (Gonzalez) 1  


 


  # Bowel Movements 1  














- Physical Exam


Head: Positive for: Atraumatic, Normocephalic


Pupils: Positive for: PERRL


Extroacular Muscles: Positive for: EOMI


Conjunctiva: Positive for: Normal


Ears: Positive for: Normal


Mouth: Positive for: Moist Mucous Membranes


Respiratory/Chest: Positive for: Clear to Auscultation.  Negative for: Wheezes


Cardiovascular: Positive for: Regular Rate and Rhythm, Normal S1, S2


Abdomen: Positive for: Normal Bowel Sounds.  Negative for: Tenderness, 

Peritoneal Signs


Upper Extremity: Positive for: NORMAL PULSES


Lower Extremity: Negative for: Edema


Skin: Positive for: Warm, Dry


Psychiatric: Positive for: Oriented x 3, Normal Insight, Normal Concentration.  

Negative for: Alert





- Medications


Active Medications: 


Active Medications











Generic Name Dose Route Start Last Admin





  Trade Name Freq  PRN Reason Stop Dose Admin


 


Acetaminophen  650 mg 03/15/17 04:18 03/16/17 09:14





  Tylenol 325mg Tab  PO   650 mg





  Q4 PRN   Administration





  Fever >100.4 F   


 


Famotidine  20 mg 03/15/17 10:00 03/22/17 21:34





  Pepcid  IVP   20 mg





  Q12 ARIELLE   Administration


 


Furosemide  40 mg 03/23/17 07:45 03/23/17 08:17





  Lasix  IVP   40 mg





  Q12 ARIELLE   Administration


 


Heparin Sodium (Porcine)  5,000 units 03/19/17 22:00 03/22/17 21:33





  Heparin  SC   5,000 units





  Q12 ARIELLE   Administration


 


Metronidazole  100 mls @ 100 mls/hr 03/15/17 06:45 03/23/17 05:50





  Flagyl  IVPB   100 mls/hr





  Q8 ARIELLE   Administration


 


Rifaximin  550 mg 03/17/17 10:45 03/22/17 19:55





  Xifaxan  PO   550 mg





  BID ARIELLE   Administration


 


Saccharomyces Boulardii  250 mg 03/22/17 10:00 03/22/17 17:53





  Florastor  PO   250 mg





  TID ARIELLE   Administration


 


Vancomycin HCl  500 mg 03/15/17 10:00 03/22/17 21:53





  Vancocin (Oral Or Rectal Use)  PO   500 mg





  QID ARIELLE   Administration














- Patient Studies


Lab Studies: 


 Microbiology Studies











 03/21/17 Unknown MRSA Culture - Final





 Nose    MRSA NOT DETECTED








 Lab Studies











  03/23/17 03/22/17 03/22/17 Range/Units





  06:25 19:20 06:41 


 


WBC  32.7 H  54.6 H*   (4.8-10.8)  K/uL


 


RBC  4.29  4.71   (3.80-5.20)  Mil/uL


 


Hgb  11.7  12.7   (11.0-16.0)  g/dL


 


Hct  36.6  40.3   (34.0-47.0)  %


 


MCV  85.2  85.7   (81.0-99.0)  fL


 


MCH  27.3  27.0   (27.0-31.0)  pg


 


MCHC  32.0 L  31.6 L   (33.0-37.0)  g/dL


 


RDW  15.8 H  15.6 H   (11.5-14.5)  %


 


Plt Count  523 H D  646 H D   (130-400)  K/uL


 


MPV  7.9  7.7   (7.2-11.7)  fL


 


Neut % (Auto)  78.2 H    (50.0-75.0)  %


 


Lymph % (Auto)  13.1 L    (20.0-40.0)  %


 


Mono % (Auto)  8.2    (0.0-10.0)  %


 


Eos % (Auto)  0.3    (0.0-4.0)  %


 


Baso % (Auto)  0.2    (0.0-2.0)  %


 


Neut #  25.6 H    (1.8-7.0)  K/uL


 


Lymph #  4.3    (1.0-4.3)  K/uL


 


Mono #  2.7 H    (0.0-0.8)  K/uL


 


Eos #  0.1    (0.0-0.7)  K/uL


 


Baso #  0.1    (0.0-0.2)  K/uL


 


Neutrophils % (Manual)    59  (50-75)  %


 


Band Neutrophils %    21 H*  (0-2)  %


 


Lymphocytes % (Manual)    3 L  (20-40)  %


 


Reactive Lymphs %    4 H  (0-0)  %


 


Monocytes % (Manual)    5  (0-10)  %


 


Metamyelocytes %    4 H  (0-0)  %


 


Myelocytes %    4 H  (0-0)  %


 


Toxic Granulation    Present  


 


Platelet Estimate    Increased H  (NORMAL)  


 


Poikilocytosis (manual    Slight  


 


Anisocytosis (manual)    Slight  


 


Sodium  130 L  131 L   (132-148)  mmol/L


 


Potassium  4.0  4.2   (3.6-5.2)  mmol/L


 


Chloride  100  98   ()  mmol/L


 


Carbon Dioxide  21 L  16 L   (22-30)  mmol/L


 


Anion Gap  13  21 H   (10-20)  


 


BUN  11  10   (7-17)  mg/dL


 


Creatinine  0.5 L  0.8   (0.7-1.2)  MG/DL


 


Est GFR (African Amer)  > 60  > 60   


 


Est GFR (Non-Af Amer)  > 60  > 60   


 


Random Glucose  77  205 H   ()  mg/dL


 


Calcium  7.5 L  7.8 L   (8.6-10.4)  mg/dl


 


Phosphorus  4.3    (2.5-4.5)  mg/dL


 


Magnesium  1.9    (1.6-2.3)  mg/dL


 


Total Bilirubin  0.2  0.3   (0.2-1.3)  mg/dL


 


AST  37 H  45 H   (14-36)  U/L


 


ALT  23  19   (9-52)  U/L


 


Alkaline Phosphatase  37 L D  62   ()  U/L


 


Total Creatine Kinase   < 20 L   ()  U/L


 


CK-MB (Mass)   1.01   (0.0-3.38)  ng/mL


 


Troponin I, Quant   0.0210   (0.00-0.120)  ng/mL


 


Total Protein  4.3 L  5.2 L   (6.3-8.3)  g/dL


 


Albumin  2.0 L  2.3 L   (3.5-5.0)  g/dL


 


Globulin  2.3  2.9   (2.2-3.9)  gm/dL


 


Albumin/Globulin Ratio  0.9 L  0.8 L   (1.0-2.1)  








 Laboratory Results - last 24 hr











  03/22/17 03/22/17 03/23/17





  06:41 19:20 06:25


 


WBC   54.6 H*  32.7 H


 


RBC   4.71  4.29


 


Hgb   12.7  11.7


 


Hct   40.3  36.6


 


MCV   85.7  85.2


 


MCH   27.0  27.3


 


MCHC   31.6 L  32.0 L


 


RDW   15.6 H  15.8 H


 


Plt Count   646 H D  523 H D


 


MPV   7.7  7.9


 


Neut % (Auto)    78.2 H


 


Lymph % (Auto)    13.1 L


 


Mono % (Auto)    8.2


 


Eos % (Auto)    0.3


 


Baso % (Auto)    0.2


 


Neut #    25.6 H


 


Lymph #    4.3


 


Mono #    2.7 H


 


Eos #    0.1


 


Baso #    0.1


 


Neutrophils % (Manual)  59  


 


Band Neutrophils %  21 H*  


 


Lymphocytes % (Manual)  3 L  


 


Reactive Lymphs %  4 H  


 


Monocytes % (Manual)  5  


 


Metamyelocytes %  4 H  


 


Myelocytes %  4 H  


 


Toxic Granulation  Present  


 


Platelet Estimate  Increased H  


 


Poikilocytosis (manual  Slight  


 


Anisocytosis (manual)  Slight  


 


Sodium   131 L  130 L


 


Potassium   4.2  4.0


 


Chloride   98  100


 


Carbon Dioxide   16 L  21 L


 


Anion Gap   21 H  13


 


BUN   10  11


 


Creatinine   0.8  0.5 L


 


Est GFR ( Amer)   > 60  > 60


 


Est GFR (Non-Af Amer)   > 60  > 60


 


Random Glucose   205 H  77


 


Calcium   7.8 L  7.5 L


 


Phosphorus    4.3


 


Magnesium    1.9


 


Total Bilirubin   0.3  0.2


 


AST   45 H  37 H


 


ALT   19  23


 


Alkaline Phosphatase   62  37 L D


 


Total Creatine Kinase   < 20 L 


 


CK-MB (Mass)   1.01 


 


Troponin I, Quant   0.0210 


 


Total Protein   5.2 L  4.3 L


 


Albumin   2.3 L  2.0 L


 


Globulin   2.9  2.3


 


Albumin/Globulin Ratio   0.8 L  0.9 L














Review of Systems





- Review of Systems


Review of Systems: 


refer to subjective





Critical Care Progress Note





- Nutrition


Nutrition: 


 Nutrition











 Category Date Time Status


 


 Pureed [Dysphagia/Modified Consistency Diet] [DIET] Diets  03/20/17 Breakfast 

Active














Assessment/Plan





- Assessment and Plan (Free Text)


Assessment: 


77 year old female with a past medical history of HTN, arthritis, anxiety, 

seizures, back problems and gall bladder disease presents with fever and C-diff 

colitis. Patient was placed on antibiotic therapy, with signs of improvement. 

Patient was transferred to the medical floor earlier in the week however had an 

episode of respiratory distress last night for which she was placed on BiPAP 

and upgraded for critical care monitoring.


Plan: 


Neuro: 


Neuro check q4 


03/15 CT Head: no definite acute intracranial abnormality . 


Midazolam 1 mg IV Q6H PRN 





Cardio: 


Hypotensive, but stable


Maintain systolic BP >110 





Pulm: 


Maintain O2 Sat >92% 


on BiPAP therapy now- Likely secondary to fluid retention- Lasix 40 mg IV Q12


Monitor


ABG: 


3/20 05:11 pH 7.28, CO2 31, O2 105, HCO3 16.3, base excess 11.0 


Imaging: 


3/20 CXR: mild bibasilar atelectasis and small effusions felt to be present 

left larger than right. 


F/U XRAY in AM





Endo: 


Maintain euglycemia 





GI: 


C. diff colitis - On abx as listed below ( ID)


Monitor BMs- Had a small loose BM today


03/20 XRAY Abdomen: distal air-filled loops of colon consistent with colitis 


03/15 CT Abdomen/Pelvis: Colitis, indeterminate splenic lesion 





: 


Monitor I/Os 


Gonzalez is in place 


Maintain Gonzalez care 





Renal: 


BUN/Cr: 11/0.5 


Monitor I/Os 


Daily BMP 


Hypokalemia  repleted 





Heme: 


H&H- 11.7/36.6


Monitor CBC 





ID: 


Patient appears clinically improved


C diff colitis 


Contact precautions 


Low grade fever 


WBC:32.7 ( decreasing)


Neutrophils: 62 (48>60>52>62>62) 


Bands: 13


Daily CBC 


Acetaminophen 650 mg PO Q4 PRN Fever >100.4 F 


Flagyl 100 cc @ 100 cc/hr IVPB Q8 ARIELLE 


Vancomycin 500 mg PO QID, Vancomycin 500 mg NC TID 


Rifaximin 550 mg PO BID ARIELLE 


Metronidazole 100 cc @ 100 cc/hr IVPB Q8 ARIELLE 


Will require PICC placement





Prophylaxis: 


GI: Pepcid 20 mg IVP Q12 


DVT: Heparin 5,000 units SC Q8





<Niko Riddle - Last Filed: 03/23/17 16:45>





CCU Objective





- Vital Signs / Intake & Output


Vital Signs (Last 4 hours): 


Vital Signs











  Temp Pulse Resp BP Pulse Ox


 


 03/23/17 16:00  97.4 F L  100 H  24  89/52 L  100


 


 03/23/17 15:00   102 H  22  94/52 L  98


 


 03/23/17 14:00   101 H  22  94/51 L  98


 


 03/23/17 13:00   118 H  23  104/48 L  100











Intake and Output (Last 8hrs): 


 Intake & Output











 03/23/17 03/23/17 03/23/17





 06:59 14:59 22:59


 


Intake Total 260 640 120


 


Output Total 670 1365 120


 


Balance -410 -725 0


 


Intake:   


 


  Intake, IV Amount 200 100 


 


    Right Proximal Port 0  


 


    Right Medial Port 200 100 





    Internal Jugular   


 


  Oral 60 540 120


 


Output:   


 


  Urine 670 1365 120


 


    Urethral (Gonzalez) 670 1365 120


 


Other:   


 


  # Bowel Movements  1 














- Medications


Active Medications: 


Active Medications











Generic Name Dose Route Start Last Admin





  Trade Name Freq  PRN Reason Stop Dose Admin


 


Acetaminophen  650 mg 03/15/17 04:18 03/16/17 09:14





  Tylenol 325mg Tab  PO   650 mg





  Q4 PRN   Administration





  Fever >100.4 F   


 


Famotidine  20 mg 03/15/17 10:00 03/23/17 09:40





  Pepcid  IVP   20 mg





  Q12 ARIELLE   Administration


 


Furosemide  40 mg 03/23/17 07:45 03/23/17 13:13





  Lasix  IVP   Not Given





  Q12 ARIELLE   


 


Heparin Sodium (Porcine)  5,000 units 03/19/17 22:00 03/23/17 09:40





  Heparin  SC   5,000 units





  Q12 ARIELLE   Administration


 


Metronidazole  100 mls @ 100 mls/hr 03/15/17 06:45 03/23/17 13:12





  Flagyl  IVPB   100 mls/hr





  Q8 ARIELLE   Administration


 


Rifaximin  550 mg 03/17/17 10:45 03/23/17 09:39





  Xifaxan  PO   550 mg





  BID ARIELLE   Administration


 


Saccharomyces Boulardii  250 mg 03/22/17 10:00 03/23/17 13:13





  Florastor  PO   250 mg





  TID ARIELLE   Administration


 


Vancomycin HCl  500 mg 03/15/17 10:00 03/23/17 13:14





  Vancocin (Oral Or Rectal Use)  PO   500 mg





  QID ARIELLE   Administration














- Patient Studies


Lab Studies: 


 Lab Studies











  03/23/17 03/22/17 Range/Units





  06:25 19:20 


 


WBC  32.7 H  54.6 H*  (4.8-10.8)  K/uL


 


RBC  4.29  4.71  (3.80-5.20)  Mil/uL


 


Hgb  11.7  12.7  (11.0-16.0)  g/dL


 


Hct  36.6  40.3  (34.0-47.0)  %


 


MCV  85.2  85.7  (81.0-99.0)  fL


 


MCH  27.3  27.0  (27.0-31.0)  pg


 


MCHC  32.0 L  31.6 L  (33.0-37.0)  g/dL


 


RDW  15.8 H  15.6 H  (11.5-14.5)  %


 


Plt Count  523 H D  646 H D  (130-400)  K/uL


 


MPV  7.9  7.7  (7.2-11.7)  fL


 


Neut % (Auto)  78.2 H   (50.0-75.0)  %


 


Lymph % (Auto)  13.1 L   (20.0-40.0)  %


 


Mono % (Auto)  8.2   (0.0-10.0)  %


 


Eos % (Auto)  0.3   (0.0-4.0)  %


 


Baso % (Auto)  0.2   (0.0-2.0)  %


 


Neut #  25.6 H   (1.8-7.0)  K/uL


 


Lymph #  4.3   (1.0-4.3)  K/uL


 


Mono #  2.7 H   (0.0-0.8)  K/uL


 


Eos #  0.1   (0.0-0.7)  K/uL


 


Baso #  0.1   (0.0-0.2)  K/uL


 


Neutrophils % (Manual)  57   (50-75)  %


 


Band Neutrophils %  13 H*   (0-2)  %


 


Lymphocytes % (Manual)  19 L   (20-40)  %


 


Monocytes % (Manual)  6   (0-10)  %


 


Metamyelocytes %  2 H   (0-0)  %


 


Myelocytes %  3 H   (0-0)  %


 


Nucleated RBC %  2 H   (0-0)  %


 


Platelet Estimate  Slightly increased H   (NORMAL)  


 


Polychromasia  Slight   


 


Hypochromasia (manual)  Slight   


 


Anisocytosis (manual)  Slight   


 


Sodium  130 L  131 L  (132-148)  mmol/L


 


Potassium  4.0  4.2  (3.6-5.2)  mmol/L


 


Chloride  100  98  ()  mmol/L


 


Carbon Dioxide  21 L  16 L  (22-30)  mmol/L


 


Anion Gap  13  21 H  (10-20)  


 


BUN  11  10  (7-17)  mg/dL


 


Creatinine  0.5 L  0.8  (0.7-1.2)  MG/DL


 


Est GFR (African Amer)  > 60  > 60  


 


Est GFR (Non-Af Amer)  > 60  > 60  


 


Random Glucose  77  205 H  ()  mg/dL


 


Calcium  7.5 L  7.8 L  (8.6-10.4)  mg/dl


 


Phosphorus  4.3   (2.5-4.5)  mg/dL


 


Magnesium  1.9   (1.6-2.3)  mg/dL


 


Total Bilirubin  0.2  0.3  (0.2-1.3)  mg/dL


 


AST  37 H  45 H  (14-36)  U/L


 


ALT  23  19  (9-52)  U/L


 


Alkaline Phosphatase  37 L D  62  ()  U/L


 


Total Creatine Kinase   < 20 L  ()  U/L


 


CK-MB (Mass)   1.01  (0.0-3.38)  ng/mL


 


Troponin I, Quant   0.0210  (0.00-0.120)  ng/mL


 


Total Protein  4.3 L  5.2 L  (6.3-8.3)  g/dL


 


Albumin  2.0 L  2.3 L  (3.5-5.0)  g/dL


 


Globulin  2.3  2.9  (2.2-3.9)  gm/dL


 


Albumin/Globulin Ratio  0.9 L  0.8 L  (1.0-2.1)  








 Laboratory Results - last 24 hr











  03/22/17 03/23/17





  19:20 06:25


 


WBC  54.6 H*  32.7 H


 


RBC  4.71  4.29


 


Hgb  12.7  11.7


 


Hct  40.3  36.6


 


MCV  85.7  85.2


 


MCH  27.0  27.3


 


MCHC  31.6 L  32.0 L


 


RDW  15.6 H  15.8 H


 


Plt Count  646 H D  523 H D


 


MPV  7.7  7.9


 


Neut % (Auto)   78.2 H


 


Lymph % (Auto)   13.1 L


 


Mono % (Auto)   8.2


 


Eos % (Auto)   0.3


 


Baso % (Auto)   0.2


 


Neut #   25.6 H


 


Lymph #   4.3


 


Mono #   2.7 H


 


Eos #   0.1


 


Baso #   0.1


 


Neutrophils % (Manual)   57


 


Band Neutrophils %   13 H*


 


Lymphocytes % (Manual)   19 L


 


Monocytes % (Manual)   6


 


Metamyelocytes %   2 H


 


Myelocytes %   3 H


 


Nucleated RBC %   2 H


 


Platelet Estimate   Slightly increased H


 


Polychromasia   Slight


 


Hypochromasia (manual)   Slight


 


Anisocytosis (manual)   Slight


 


Sodium  131 L  130 L


 


Potassium  4.2  4.0


 


Chloride  98  100


 


Carbon Dioxide  16 L  21 L


 


Anion Gap  21 H  13


 


BUN  10  11


 


Creatinine  0.8  0.5 L


 


Est GFR ( Amer)  > 60  > 60


 


Est GFR (Non-Af Amer)  > 60  > 60


 


Random Glucose  205 H  77


 


Calcium  7.8 L  7.5 L


 


Phosphorus   4.3


 


Magnesium   1.9


 


Total Bilirubin  0.3  0.2


 


AST  45 H  37 H


 


ALT  19  23


 


Alkaline Phosphatase  62  37 L D


 


Total Creatine Kinase  < 20 L 


 


CK-MB (Mass)  1.01 


 


Troponin I, Quant  0.0210 


 


Total Protein  5.2 L  4.3 L


 


Albumin  2.3 L  2.0 L


 


Globulin  2.9  2.3


 


Albumin/Globulin Ratio  0.8 L  0.9 L














Critical Care Progress Note





- Nutrition


Nutrition: 


 Nutrition











 Category Date Time Status


 


 Pureed [Dysphagia/Modified Consistency Diet] [DIET] Diets  03/20/17 Breakfast 

Active














Assessment/Plan


(1) C. difficile colitis


Current Visit: Yes   Status: Acute








Attending/Attestation





- Attestation


I have personally seen and examined this patient.: Yes


I have fully participated in the care of the patient.: Yes


I have reviewed all pertinent clinical information: Yes


Notes (Text): 





03/23/17 16:44


I have seen and examined the patient.  Medical records, lab studies, and 

imaging were reviewed by me and a management plan was formulated on 

multidisciplinary rounds with resident Dr. Spencer. I agree with their above 

documented assessment and plan.


Pulm: Acute respiratory failure secondary to fluid overload, starting diuresis. 

Patient already improving now on nasal cannula oxygen.





Critical Care Time 35 minutes.





Multi-disciplinary rounds were performed with house staff, nursing, speech 

therapy, respiratory therapy, pharmacy and nutrition with integrated input from 

the primary team/attending and other consulting services.  The documented time 

is cumulative and includes review of patient data/exams/labs/chart review and 

examination of the patient on rounds and throughout the day; time is exclusive 

of any procedures or teaching time.

## 2017-03-23 NOTE — RAD
HISTORY:

ch[facility]  



COMPARISON:

3/20/2017 



FINDINGS:



LUNGS:

Lung volumes are shallow.  Interval increased pulmonary venous 

congestion with interval right mid lung zone discoid atelectasis 

suggested.



PLEURA:

Tiny bilateral pleural effusions suggested. No pneumothorax apparent.



CARDIOVASCULAR:

Cardiomegaly-as before



OSSEOUS STRUCTURES:

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



VISUALIZED UPPER ABDOMEN:

A 5 cm rim calcified left upper abdominal quadrant lesion is present. 

A large old  calcified splenic hematoma is probable



OTHER FINDINGS:

The right central venous catheter tip is in the superior vena cava as 

before. 



IMPRESSION:

Prior endotracheal tube - removed.



Interval increased pulmonary venous congestion with cardiomegaly 

-consistent with mild CHF

## 2017-03-23 NOTE — CP.PCM.PN
Subjective





- Date & Time of Evaluation


Date of Evaluation: 03/23/17


Time of Evaluation: 11:20





- Subjective


Subjective: 


F/U colitis.


Pt reports feeling better.  CAme back to CCU for SOB- less now.


Denies RB, melena, Co, SZ, HA, cough, hematuria, hemoptysis.  Reports less 

diarrhea.





Objective





- Vital Signs/Intake and Output


Vital Signs (last 24 hours): 


 











Temp Pulse Resp BP Pulse Ox


 


 97.5 F L  104 H  22   99/57 L  100 


 


 03/23/17 08:00  03/23/17 11:00  03/23/17 11:00  03/23/17 11:00  03/23/17 11:00








Intake and Output: 


 











 03/23/17 03/23/17





 06:59 18:59


 


Intake Total 680 240


 


Output Total 920 840


 


Balance -240 -600














- Medications


Medications: 


 Current Medications





Acetaminophen (Tylenol 325mg Tab)  650 mg PO Q4 PRN


   PRN Reason: Fever >100.4 F


   Last Admin: 03/16/17 09:14 Dose:  650 mg


Famotidine (Pepcid)  20 mg IVP Q12 [facility]


   Last Admin: 03/23/17 09:40 Dose:  20 mg


Furosemide (Lasix)  40 mg IVP Q12 [facility]


   Last Admin: 03/23/17 08:17 Dose:  40 mg


Heparin Sodium (Porcine) (Heparin)  5,000 units SC Q12 [facility]


   Last Admin: 03/23/17 09:40 Dose:  5,000 units


Metronidazole (Flagyl)  100 mls @ 100 mls/hr IVPB Q8 [facility]


   Last Admin: 03/23/17 05:50 Dose:  100 mls/hr


Rifaximin (Xifaxan)  550 mg PO BID [facility]


   Last Admin: 03/23/17 09:39 Dose:  550 mg


Saccharomyces Boulardii (Florastor)  250 mg PO TID [facility]


   Last Admin: 03/23/17 09:41 Dose:  250 mg


Vancomycin HCl (Vancocin (Oral Or Rectal Use))  500 mg PO QID [facility]


   Last Admin: 03/23/17 09:41 Dose:  500 mg











- Labs


Labs: 


 





 03/23/17 06:25 





 03/23/17 06:25 





 











PT  14.8 SECONDS (9.7-12.2)  H  03/15/17  01:01    


 


INR  1.3   03/15/17  01:01    


 


APTT  37 SECONDS (21-34)  H  03/15/17  01:01    














- Constitutional


Appears: Non-toxic





- Respiratory Exam


Respiratory Exam: Wheezes





- Cardiovascular Exam


Cardiovascular Exam: RRR





- GI/Abdominal Exam


GI & Abdominal Exam: Distended, Soft, Normal Bowel Sounds.  absent: Tenderness





- Extremities Exam


Extremities Exam: absent: Calf Tenderness





- Neurological Exam


Neurological Exam: Alert, Awake





Assessment and Plan


(1) Acute respiratory failure


Assessment & Plan: 


SOB- improving.  Likely fluid overload.


Status: Acute





(2) Sepsis


Status: Acute





(3) Abdominal pain


Status: Acute





(4) GERD (gastroesophageal reflux disease)


Status: Suspected





(5) Colitis


Assessment & Plan: 


c diff.  Improving on meds


Status: Acute





(6) Diarrhea


Assessment & Plan: 


Lessening


Status: Acute

## 2017-03-23 NOTE — CP.PCM.PN
Subjective





- Date & Time of Evaluation


Date of Evaluation: 03/23/17


Time of Evaluation: 20:40





- Subjective


Subjective: 


INFECTIOUS DISEASE IUC/CCU PROGRESS NOTE #6


NANCY JUÁREZ MD, FACP


3/23/2017





CHART REVIEWED


PT EXAMINED


CASE DISCUSSED





CLINICALLY EVENTS NOTED, FROM CHF AND REQUIING TRANSFER TO ICU/CCU WITH USE OF 

BIPAP AND LASIX.


WBC SLOWLY RESPONDING, WOULD CHANGE OFF RECDTAL VANCOMCIN AND CONTINUE HIGH 

DOSE PO VANCOMYCIN 500 MG QID.


WATCH HER FLUID STATUS.





Objective





- Vital Signs/Intake and Output


Vital Signs (last 24 hours): 


 











Temp Pulse Resp BP Pulse Ox


 


 97.4 F L  96 H  22   99/52 L  100 


 


 03/23/17 16:00  03/23/17 18:00  03/23/17 18:00  03/23/17 18:00  03/23/17 18:00








Intake and Output: 


 











 03/23/17 03/24/17





 18:59 06:59


 


Intake Total 1060 


 


Output Total 1745 


 


Balance -685 














- Medications


Medications: 


 Current Medications





Acetaminophen (Tylenol 325mg Tab)  650 mg PO Q4 PRN


   PRN Reason: Fever >100.4 F


   Last Admin: 03/16/17 09:14 Dose:  650 mg


Famotidine (Pepcid)  20 mg IVP Q12 [facility]


   Last Admin: 03/23/17 09:40 Dose:  20 mg


Furosemide (Lasix)  40 mg IVP Q12 [facility]


   Last Admin: 03/23/17 13:13 Dose:  Not Given


Heparin Sodium (Porcine) (Heparin)  5,000 units SC Q12 [facility]


   Last Admin: 03/23/17 09:40 Dose:  5,000 units


Metronidazole (Flagyl)  100 mls @ 100 mls/hr IVPB Q8 [facility]


   Last Admin: 03/23/17 13:12 Dose:  100 mls/hr


Rifaximin (Xifaxan)  550 mg PO BID [facility]


   Last Admin: 03/23/17 17:29 Dose:  550 mg


Saccharomyces Boulardii (Florastor)  250 mg PO TID [facility]


   Last Admin: 03/23/17 17:30 Dose:  250 mg


Vancomycin HCl (Vancocin (Oral Or Rectal Use))  500 mg PO QID [facility]


   Last Admin: 03/23/17 17:30 Dose:  500 mg











- Labs


Labs: 


 





 03/23/17 06:25 





 03/23/17 06:25 





 











PT  14.8 SECONDS (9.7-12.2)  H  03/15/17  01:01    


 


INR  1.3   03/15/17  01:01    


 


APTT  37 SECONDS (21-34)  H  03/15/17  01:01    














- Constitutional


Appears: Non-toxic, Older Than Stated Age, Chronically Ill





- Head Exam


Head Exam: ATRAUMATIC





- Eye Exam


Eye Exam: Normal appearance





- ENT Exam


ENT Exam: Mucous Membranes Dry





- Respiratory Exam


Respiratory Exam: Decreased Breath Sounds, NORMAL BREATHING PATTERN





- Cardiovascular Exam


Cardiovascular Exam: REGULAR RHYTHM





- GI/Abdominal Exam


GI & Abdominal Exam: Soft, Hypoactive Bowel Sounds.  absent: Tenderness, Rebound





- Rectal Exam


Rectal Exam: Deferred





Assessment and Plan


(1) C. difficile colitis


Status: Acute





(2) Acute respiratory failure


Assessment & Plan: 


NOW ACUTE CHF AS ALSO APPRECIATED ON CXR.


Status: Acute





(3) GERD (gastroesophageal reflux disease)


Status: Suspected





(4) Knee injury


Status: Chronic





(5) Sepsis


Status: Acute





(6) Bladder incontinence


Status: Chronic





(7) Abdominal pain


Status: Acute





(8) Seizure disorder


Status: Chronic

## 2017-03-23 NOTE — CP.PCM.PN
Subjective





- Date & Time of Evaluation


Date of Evaluation: 03/23/17


Time of Evaluation: 17:50





- Subjective


Subjective: 


pt is feeling better now


off bipap


eating ok


less edema


making urination





low bp





 











Temp Pulse Resp BP Pulse Ox


 


 97.4 F L  96 H  21   91/50 L  100 


 


 03/23/17 16:00  03/23/17 17:00  03/23/17 17:00  03/23/17 17:00  03/23/17 17:00





chest good air entry


regular hs


abd soft


edema noted





 











Temp Pulse Resp BP Pulse Ox


 


 97.4 F L  96 H  21   91/50 L  100 


 


 03/23/17 16:00  03/23/17 17:00  03/23/17 17:00  03/23/17 17:00  03/23/17 17:00








 





 03/23/17 06:25 





 03/23/17 06:25 





a/P


likly fluid overload


pulmonary edema improving


c diff


seizures


diarrhea


continue current treatment





Objective





- Vital Signs/Intake and Output


Vital Signs (last 24 hours): 


 











Temp Pulse Resp BP Pulse Ox


 


 97.4 F L  96 H  21   91/50 L  100 


 


 03/23/17 16:00  03/23/17 17:00  03/23/17 17:00  03/23/17 17:00  03/23/17 17:00








Intake and Output: 


 











 03/23/17 03/23/17





 06:59 18:59


 


Intake Total 680 880


 


Output Total 920 1545


 


Balance -240 -665














- Medications


Medications: 


 Current Medications





Acetaminophen (Tylenol 325mg Tab)  650 mg PO Q4 PRN


   PRN Reason: Fever >100.4 F


   Last Admin: 03/16/17 09:14 Dose:  650 mg


Famotidine (Pepcid)  20 mg IVP Q12 [facility]


   Last Admin: 03/23/17 09:40 Dose:  20 mg


Furosemide (Lasix)  40 mg IVP Q12 [facility]


   Last Admin: 03/23/17 13:13 Dose:  Not Given


Heparin Sodium (Porcine) (Heparin)  5,000 units SC Q12 [facility]


   Last Admin: 03/23/17 09:40 Dose:  5,000 units


Metronidazole (Flagyl)  100 mls @ 100 mls/hr IVPB Q8 [facility]


   Last Admin: 03/23/17 13:12 Dose:  100 mls/hr


Rifaximin (Xifaxan)  550 mg PO BID [facility]


   Last Admin: 03/23/17 17:29 Dose:  550 mg


Saccharomyces Boulardii (Florastor)  250 mg PO TID [facility]


   Last Admin: 03/23/17 17:30 Dose:  250 mg


Vancomycin HCl (Vancocin (Oral Or Rectal Use))  500 mg PO QID [facility]


   Last Admin: 03/23/17 17:30 Dose:  500 mg











- Labs


Labs: 


 





 03/23/17 06:25 





 03/23/17 06:25 





 











PT  14.8 SECONDS (9.7-12.2)  H  03/15/17  01:01    


 


INR  1.3   03/15/17  01:01    


 


APTT  37 SECONDS (21-34)  H  03/15/17  01:01

## 2017-03-24 NOTE — CP.CCUPN
<Hadley Spencer - Last Filed: 03/24/17 18:22>





CCU Subjective





- Physician Review


Subjective (Free Text): 


03/16/17 17:58


Patient seen at bedside and is in no acute distress.  Patient is intubated Vent 

settings ( FiO2 50% RR 14 PEEP 5 ). Isolation precautions in place due to 

c. diff colitis findings. Patient cannot comply to an ROS at this time due to 

intubation and sedation. Family bedside and informed of management.. 





03/17/17 12:11





Patient seen at beside and is in no acute distress. Patient was placed on a 

trial of CPAP with PS 10 and PEEP of 5. Patient tolerated CPAP well. Patient 

was extubated at 12pm with a follow up ABG at 1 pm. Patient remains on 8 mcg/

min of levophed. No ROS obtained at this time secondary to somnolence.





03/20/17 15:02


Pt seen and examined in no acute distress.  Vitals stable. Unable to obtain ROS 

at initial time of evaluation because patient was still intubated. Patient





03/21/17 14:22


Patient seen and examined in no acute distress. Patient was extubated yesterday 

and on 3 L nc cannula satting well. Nursing reports no acute events overnight. 

Patient OOB to chair and responding well to questions. Patient still has 

diarrhea, which has been worse since after enema. Patient denies subjective 

fevers or chills, nausea, vomiting, constipation, paresthesias or headaches at 

this time.





03/23/17 08:59


Pt seen and examined in no acute distress. Patient currently on BiPAP. Patient 

states that she became short of breath last night. She felt very uncomfortable 

and nursing was alerted.  bedside stated that patient's legs appeared 

very swollen yesterday; decreasing considerably today. Patient admits to some 

diarrhea. She denied any chest pain, palpitations, current dyspnea, subjective 

fevers or chills, nausea, vomiting or constipation at this time.





03/24/17 18:05


Pt seen and examined in no acute distress. Patient is saturating well at 99% on 

3 L NC. Earlier in the morning, patient was breathing at a rate of 27, but this 

later decreased to a rate of 20. PICC line consent was obtained during morning 

rounds. Patient's questions and concerns were addressed.  Patient tolerated her 

breakfast without complaints. Patient had another episode of diarrhea. Patient 

denies chest pain, palpitations, current dyspnea, subjective fevers or chills, 

nausea, vomiting or constipation at this time.


 











03/24/17 18:30








CCU Objective





- Vital Signs / Intake & Output


Vital Signs (Last 4 hours): 


Vital Signs











  Temp Pulse Resp BP Pulse Ox


 


 03/24/17 17:00   98 H  21  110/56 L  99


 


 03/24/17 16:00  99 F  101 H  24  106/44 L  97


 


 03/24/17 15:00   100 H  24  106/44 L  97











Intake and Output (Last 8hrs): 


 Intake & Output











 03/24/17 03/24/17 03/24/17





 06:59 14:59 22:59


 


Intake Total 0 1130 100


 


Output Total 530 1000 650


 


Balance -530 130 -550


 


Weight 211 lb 10.3 oz  


 


Intake:   


 


  Intake, IV Amount  200 


 


    Right Medial Port  200 





    Internal Jugular   


 


  Oral 0 930 100


 


Output:   


 


  Urine 530 1000 650


 


    Urethral (Gonzalez) 530 1000 650


 


Other:   


 


  # Bowel Movements  1 1














- Physical Exam


Head: Positive for: Atraumatic, Normocephalic


Pupils: Positive for: PERRL


Extroacular Muscles: Positive for: EOMI


Conjunctiva: Positive for: Normal


Ears: Positive for: Normal


Mouth: Positive for: Moist Mucous Membranes


Respiratory/Chest: Positive for: Clear to Auscultation.  Negative for: Wheezes


Cardiovascular: Positive for: Regular Rate and Rhythm, Normal S1, S2


Abdomen: Positive for: Normal Bowel Sounds.  Negative for: Tenderness, 

Peritoneal Signs, Guarding


Upper Extremity: Positive for: NORMAL PULSES


Lower Extremity: Negative for: Edema


Neurological: Positive for: CN II-XII Intact, Speech Normal


Skin: Positive for: Warm, Dry.  Negative for: Rashes


Psychiatric: Positive for: Oriented x 3, Normal Insight, Normal Concentration, 

Normal Affect, Normal Mood.  Negative for: Alert





- Medications


Active Medications: 


Active Medications











Generic Name Dose Route Start Last Admin





  Trade Name Freq  PRN Reason Stop Dose Admin


 


Acetaminophen  650 mg 03/15/17 04:18 03/16/17 09:14





  Tylenol 325mg Tab  PO   650 mg





  Q4 PRN   Administration





  Fever >100.4 F   


 


Famotidine  20 mg 03/15/17 10:00 03/24/17 09:25





  Pepcid  IVP   20 mg





  Q12 [facility]   Administration


 


Furosemide  40 mg 03/25/17 10:00  





  Lasix  IVP   





  DAILY [facility]   


 


Heparin Sodium (Porcine)  5,000 units 03/19/17 22:00 03/24/17 09:24





  Heparin  SC   5,000 units





  Q12 [facility]   Administration


 


Metronidazole  100 mls @ 100 mls/hr 03/15/17 06:45 03/24/17 13:13





  Flagyl  IVPB   100 mls/hr





  Q8 [facility]   Administration


 


Rifaximin  550 mg 03/17/17 10:45 03/24/17 17:33





  Xifaxan  PO   550 mg





  BID [facility]   Administration


 


Saccharomyces Boulardii  250 mg 03/22/17 10:00 03/24/17 17:32





  Florastor  PO   250 mg





  TID [facility]   Administration


 


Vancomycin HCl  500 mg 03/15/17 10:00 03/24/17 17:33





  Vancocin (Oral Or Rectal Use)  PO   500 mg





  QID [facility]   Administration














- Patient Studies


Lab Studies: 


 Microbiology Studies











 03/22/17 20:42 MRSA Culture (Admit) - Final





 Naris    MRSA NOT DETECTED








 Lab Studies











  03/24/17 Range/Units





  08:19 


 


WBC  23.6 H  (4.8-10.8)  K/uL


 


RBC  4.34  (3.80-5.20)  Mil/uL


 


Hgb  11.6  (11.0-16.0)  g/dL


 


Hct  36.2  (34.0-47.0)  %


 


MCV  83.5  (81.0-99.0)  fL


 


MCH  26.7 L  (27.0-31.0)  pg


 


MCHC  31.9 L  (33.0-37.0)  g/dL


 


RDW  15.4 H  (11.5-14.5)  %


 


Plt Count  442 H  (130-400)  K/uL


 


MPV  7.6  (7.2-11.7)  fL


 


Neut % (Auto)  75.6 H  (50.0-75.0)  %


 


Lymph % (Auto)  16.0 L  (20.0-40.0)  %


 


Mono % (Auto)  7.1  (0.0-10.0)  %


 


Eos % (Auto)  1.0  (0.0-4.0)  %


 


Baso % (Auto)  0.3  (0.0-2.0)  %


 


Neut #  17.8 H  (1.8-7.0)  K/uL


 


Lymph #  3.8  (1.0-4.3)  K/uL


 


Mono #  1.7 H  (0.0-0.8)  K/uL


 


Eos #  0.2  (0.0-0.7)  K/uL


 


Baso #  0.1  (0.0-0.2)  K/uL


 


Neutrophils % (Manual)  64  (50-75)  %


 


Band Neutrophils %  8 H  (0-2)  %


 


Lymphocytes % (Manual)  18 L  (20-40)  %


 


Reactive Lymphs %  2 H  (0-0)  %


 


Monocytes % (Manual)  4  (0-10)  %


 


Metamyelocytes %  3 H  (0-0)  %


 


Myelocytes %  1 H  (0-0)  %


 


Platelet Estimate  Slightly increased H  (NORMAL)  


 


Anisocytosis (manual)  Slight  


 


Sodium  131 L  (132-148)  mmol/L


 


Potassium  3.6  (3.6-5.2)  mmol/L


 


Chloride  99  ()  mmol/L


 


Carbon Dioxide  22  (22-30)  mmol/L


 


Anion Gap  14  (10-20)  


 


BUN  9  (7-17)  mg/dL


 


Creatinine  0.5 L  (0.7-1.2)  MG/DL


 


Est GFR (African Amer)  > 60  


 


Est GFR (Non-Af Amer)  > 60  


 


Random Glucose  84  ()  mg/dL


 


Calcium  7.3 L  (8.6-10.4)  mg/dl


 


Phosphorus  3.7  (2.5-4.5)  mg/dL


 


Magnesium  1.5 L  (1.6-2.3)  mg/dL


 


Total Bilirubin  0.2  (0.2-1.3)  mg/dL


 


AST  45 H D  (14-36)  U/L


 


ALT  20  (9-52)  U/L


 


Alkaline Phosphatase  41  ()  U/L


 


Total Protein  4.7 L  (6.3-8.3)  g/dL


 


Albumin  2.0 L  (3.5-5.0)  g/dL


 


Globulin  2.7  (2.2-3.9)  gm/dL


 


Albumin/Globulin Ratio  0.8 L  (1.0-2.1)  








 Laboratory Results - last 24 hr











  03/24/17





  08:19


 


WBC  23.6 H


 


RBC  4.34


 


Hgb  11.6


 


Hct  36.2


 


MCV  83.5


 


MCH  26.7 L


 


MCHC  31.9 L


 


RDW  15.4 H


 


Plt Count  442 H


 


MPV  7.6


 


Neut % (Auto)  75.6 H


 


Lymph % (Auto)  16.0 L


 


Mono % (Auto)  7.1


 


Eos % (Auto)  1.0


 


Baso % (Auto)  0.3


 


Neut #  17.8 H


 


Lymph #  3.8


 


Mono #  1.7 H


 


Eos #  0.2


 


Baso #  0.1


 


Neutrophils % (Manual)  64


 


Band Neutrophils %  8 H


 


Lymphocytes % (Manual)  18 L


 


Reactive Lymphs %  2 H


 


Monocytes % (Manual)  4


 


Metamyelocytes %  3 H


 


Myelocytes %  1 H


 


Platelet Estimate  Slightly increased H


 


Anisocytosis (manual)  Slight


 


Sodium  131 L


 


Potassium  3.6


 


Chloride  99


 


Carbon Dioxide  22


 


Anion Gap  14


 


BUN  9


 


Creatinine  0.5 L


 


Est GFR ( Amer)  > 60


 


Est GFR (Non-Af Amer)  > 60


 


Random Glucose  84


 


Calcium  7.3 L


 


Phosphorus  3.7


 


Magnesium  1.5 L


 


Total Bilirubin  0.2


 


AST  45 H D


 


ALT  20


 


Alkaline Phosphatase  41


 


Total Protein  4.7 L


 


Albumin  2.0 L


 


Globulin  2.7


 


Albumin/Globulin Ratio  0.8 L














Review of Systems





- Review of Systems


Review of Systems: 


refer to subjective





Critical Care Progress Note





- Nutrition


Nutrition: 


 Nutrition











 Category Date Time Status


 


 Pureed [Dysphagia/Modified Consistency Diet] [DIET] Diets  03/20/17 Breakfast 

Active














Assessment/Plan





- Assessment and Plan (Free Text)


Assessment: 


77 year old female with a past medical history of HTN, arthritis, anxiety, 

seizures, back problems and gall bladder disease presents with fever and C-diff 

colitis. Patient was placed on antibiotic therapy, was transferred to the floor

, but brought back to the ICU for further monitoring after a subsequent episode 

of respiratory distress requiring BiPAP and close monitr. The plan is to 

continue diuretics for one more day and to add patient's home seizure 

medications to be administered during the patient's stay at the hospital.





Plan: 


Neuro: 


Neuro check q4 


03/15 CT Head: no definite acute intracranial abnormality . 


Midazolam 1 mg IV Q6H PRN  


Hx of seizures, restart anti-epileptic agents


 


Cardio: 


Intermittent tachycardia


Maintain systolic BP >110 


Furosemide 40 mg IVP daily 


 


Pulm: 


Maintain O2 Sat >92% 


3/23 3L NC 


 


Imaging: 


3/24 CXR: interval introduction of right-sided PICC with distal tip of catheter 

overlying projection of SVC/right atrial junction. Hazy opacity in the right 

base noted again. Refer to full report





 


Endo: 


Maintain euglycemia 


 


GI: 


C. diff colitis 


Monitor BM 


Pureed - Tolerated diet 


03/20 XRAY Abdomen: distal air-filled loops of colon consistent with colitis 


03/15 CT Abdomen/Pelvis: Colitis, indeterminate splenic lesion 


Saccharomyces boulardii 250 mg PO TID [facility] 


 


: 


I/Os 1540/2470 = - 930cc 


Monitor I/Os 


Gonzalez is in place 


Maintain Gonzalez care 


 


Renal: 


BUN/Cr: 9/0.5 (11/0.5 > 9/0.5) 


Monitor I/Os 


Daily BMP 


Hypomagnesemia  repleted 


Total protein 4.7 (4.3>4.7) 


Albumin 2.0 (2.0 > 2.0) 


 


Heme: 


H&H-  11.6/36.2 (11.7/36.6 > 11.6/36.2) 


Monitor CBC 


 


ID:  


C diff colitis 


Contact precautions  


WBC: 23.7  ( trending down)


Neutrophils: 64  


Bands: 8 (37>26>23>18>12>21>13>8)  ( trending down)


Clinically appears improved


3/16: C-diff toxin Positive 


Daily CBC 


Acetaminophen 650 mg PO Q4 PRN Fever >100.4 F 


Flagyl 100 cc @ 100 cc/hr IVPB Q8 [facility] 


Vancomycin 500 mg PO QID 


Rifaximin 550 mg PO BID [facility] 


 


Prophylaxis: 


GI: Pepcid 20 mg IVP Q12 


DVT:  Heparin 5,000 units SC Q8 





<Niko Riddle - Last Filed: 03/24/17 18:45>





CCU Objective





- Vital Signs / Intake & Output


Vital Signs (Last 4 hours): 


Vital Signs











  Temp Pulse Resp BP Pulse Ox


 


 03/24/17 17:00   98 H  21  110/56 L  99


 


 03/24/17 16:00  99 F  101 H  24  106/44 L  97


 


 03/24/17 15:00   100 H  24  106/44 L  97











Intake and Output (Last 8hrs): 


 Intake & Output











 03/24/17 03/24/17 03/24/17





 06:59 14:59 22:59


 


Intake Total 0 1130 100


 


Output Total 530 1000 650


 


Balance -530 130 -550


 


Weight 211 lb 10.3 oz  


 


Intake:   


 


  Intake, IV Amount  200 


 


    Right Medial Port  200 





    Internal Jugular   


 


  Oral 0 930 100


 


Output:   


 


  Urine 530 1000 650


 


    Urethral (Gonzalez) 530 1000 650


 


Other:   


 


  # Bowel Movements  1 1














- Medications


Active Medications: 


Active Medications











Generic Name Dose Route Start Last Admin





  Trade Name Freq  PRN Reason Stop Dose Admin


 


Acetaminophen  650 mg 03/15/17 04:18 03/16/17 09:14





  Tylenol 325mg Tab  PO   650 mg





  Q4 PRN   Administration





  Fever >100.4 F   


 


Divalproex Sodium  250 mg 03/25/17 10:00  





  Depakote Er  PO   





  DAILY [facility]EL[facility]   


 


Divalproex Sodium  500 mg 03/24/17 22:00  





  Depakote Er  PO   





  HS [facility]   


 


Famotidine  20 mg 03/15/17 10:00 03/24/17 09:25





  Pepcid  IVP   20 mg





  Q12 [facility]   Administration


 


Furosemide  40 mg 03/25/17 10:00  





  Lasix  IVP   





  DAILY [facility]   


 


Heparin Sodium (Porcine)  5,000 units 03/19/17 22:00 03/24/17 09:24





  Heparin  SC   5,000 units





  Q12 [facility]   Administration


 


Metronidazole  100 mls @ 100 mls/hr 03/15/17 06:45 03/24/17 13:13





  Flagyl  IVPB   100 mls/hr





  Q8 [facility]   Administration


 


Lamotrigine  50 mg 03/25/17 10:00  





  Lamictal  PO   





  DAILY [facility]EL[facility]   


 


Rifaximin  550 mg 03/17/17 10:45 03/24/17 17:33





  Xifaxan  PO   550 mg





  BID [facility]   Administration


 


Saccharomyces Boulardii  250 mg 03/22/17 10:00 03/24/17 17:32





  Florastor  PO   250 mg





  TID [facility]   Administration


 


Vancomycin HCl  500 mg 03/15/17 10:00 03/24/17 17:33





  Vancocin (Oral Or Rectal Use)  PO   500 mg





  QID [facility]   Administration














- Patient Studies


Lab Studies: 


 Microbiology Studies











 03/22/17 20:42 MRSA Culture (Admit) - Final





 [facility]    MRSA NOT DETECTED








 Lab Studies











  03/24/17 Range/Units





  08:19 


 


WBC  23.6 H  (4.8-10.8)  K/uL


 


RBC  4.34  (3.80-5.20)  Mil/uL


 


Hgb  11.6  (11.0-16.0)  g/dL


 


Hct  36.2  (34.0-47.0)  %


 


MCV  83.5  (81.0-99.0)  fL


 


MCH  26.7 L  (27.0-31.0)  pg


 


MCHC  31.9 L  (33.0-37.0)  g/dL


 


RDW  15.4 H  (11.5-14.5)  %


 


Plt Count  442 H  (130-400)  K/uL


 


MPV  7.6  (7.2-11.7)  fL


 


Neut % (Auto)  75.6 H  (50.0-75.0)  %


 


Lymph % (Auto)  16.0 L  (20.0-40.0)  %


 


Mono % (Auto)  7.1  (0.0-10.0)  %


 


Eos % (Auto)  1.0  (0.0-4.0)  %


 


Baso % (Auto)  0.3  (0.0-2.0)  %


 


Neut #  17.8 H  (1.8-7.0)  K/uL


 


Lymph #  3.8  (1.0-4.3)  K/uL


 


Mono #  1.7 H  (0.0-0.8)  K/uL


 


Eos #  0.2  (0.0-0.7)  K/uL


 


Baso #  0.1  (0.0-0.2)  K/uL


 


Neutrophils % (Manual)  64  (50-75)  %


 


Band Neutrophils %  8 H  (0-2)  %


 


Lymphocytes % (Manual)  18 L  (20-40)  %


 


Reactive Lymphs %  2 H  (0-0)  %


 


Monocytes % (Manual)  4  (0-10)  %


 


Metamyelocytes %  3 H  (0-0)  %


 


Myelocytes %  1 H  (0-0)  %


 


Platelet Estimate  Slightly increased H  (NORMAL)  


 


Anisocytosis (manual)  Slight  


 


Sodium  131 L  (132-148)  mmol/L


 


Potassium  3.6  (3.6-5.2)  mmol/L


 


Chloride  99  ()  mmol/L


 


Carbon Dioxide  22  (22-30)  mmol/L


 


Anion Gap  14  (10-20)  


 


BUN  9  (7-17)  mg/dL


 


Creatinine  0.5 L  (0.7-1.2)  MG/DL


 


Est GFR (African Amer)  > 60  


 


Est GFR (Non-Af Amer)  > 60  


 


Random Glucose  84  ()  mg/dL


 


Calcium  7.3 L  (8.6-10.4)  mg/dl


 


Phosphorus  3.7  (2.5-4.5)  mg/dL


 


Magnesium  1.5 L  (1.6-2.3)  mg/dL


 


Total Bilirubin  0.2  (0.2-1.3)  mg/dL


 


AST  45 H D  (14-36)  U/L


 


ALT  20  (9-52)  U/L


 


Alkaline Phosphatase  41  ()  U/L


 


Total Protein  4.7 L  (6.3-8.3)  g/dL


 


Albumin  2.0 L  (3.5-5.0)  g/dL


 


Globulin  2.7  (2.2-3.9)  gm/dL


 


Albumin/Globulin Ratio  0.8 L  (1.0-2.1)  








 Laboratory Results - last 24 hr











  03/24/17





  08:19


 


WBC  23.6 H


 


RBC  4.34


 


Hgb  11.6


 


Hct  36.2


 


MCV  83.5


 


MCH  26.7 L


 


MCHC  31.9 L


 


RDW  15.4 H


 


Plt Count  442 H


 


MPV  7.6


 


Neut % (Auto)  75.6 H


 


Lymph % (Auto)  16.0 L


 


Mono % (Auto)  7.1


 


Eos % (Auto)  1.0


 


Baso % (Auto)  0.3


 


Neut #  17.8 H


 


Lymph #  3.8


 


Mono #  1.7 H


 


Eos #  0.2


 


Baso #  0.1


 


Neutrophils % (Manual)  64


 


Band Neutrophils %  8 H


 


Lymphocytes % (Manual)  18 L


 


Reactive Lymphs %  2 H


 


Monocytes % (Manual)  4


 


Metamyelocytes %  3 H


 


Myelocytes %  1 H


 


Platelet Estimate  Slightly increased H


 


Anisocytosis (manual)  Slight


 


Sodium  131 L


 


Potassium  3.6


 


Chloride  99


 


Carbon Dioxide  22


 


Anion Gap  14


 


BUN  9


 


Creatinine  0.5 L


 


Est GFR ( Amer)  > 60


 


Est GFR (Non-Af Amer)  > 60


 


Random Glucose  84


 


Calcium  7.3 L


 


Phosphorus  3.7


 


Magnesium  1.5 L


 


Total Bilirubin  0.2


 


AST  45 H D


 


ALT  20


 


Alkaline Phosphatase  41


 


Total Protein  4.7 L


 


Albumin  2.0 L


 


Globulin  2.7


 


Albumin/Globulin Ratio  0.8 L














Critical Care Progress Note





- Nutrition


Nutrition: 


 Nutrition











 Category Date Time Status


 


 Pureed [Dysphagia/Modified Consistency Diet] [DIET] Diets  03/20/17 Breakfast 

Active














Assessment/Plan


(1) C. difficile colitis


Current Visit: Yes   Status: Acute








Attending/Attestation





- Attestation


I have personally seen and examined this patient.: Yes


I have fully participated in the care of the patient.: Yes


I have reviewed all pertinent clinical information: Yes


Notes (Text): 





03/24/17 18:44


I have seen and examined the patient.  Medical records, lab studies, and 

imaging were reviewed by me and a management plan was formulated on 

multidisciplinary rounds with resident Dr. Spnecer. I agree with their above 

documented assessment and plan.


Clinically stable for downgrade to the floors.





Critical Care Time 35 minutes.





Multi-disciplinary rounds were performed with house staff, nursing, speech 

therapy, respiratory therapy, pharmacy and nutrition with integrated input from 

the primary team/attending and other consulting services.  The documented time 

is cumulative and includes review of patient data/exams/labs/chart review and 

examination of the patient on rounds and throughout the day; time is exclusive 

of any procedures or teaching time.

## 2017-03-24 NOTE — CP.PCM.PN
Subjective





- Date & Time of Evaluation


Date of Evaluation: 03/24/17


Time of Evaluation: 12:28





- Subjective


Subjective: 


patient condition is improving.


Vital signs are stable.


WBC getting better


Tolerating oral feeding


On examination:


 











Temp Pulse Resp BP Pulse Ox


 


 97 F L  92 H  22   117/69   99 


 


 03/24/17 08:00  03/24/17 11:00  03/24/17 11:00  03/24/17 11:00  03/24/17 11:00





chest good air entry bilaterally


Regular heart sound


Abdomen nontender


Pedal edema noted


 





 03/24/17 08:19 





 03/24/17 08:19 





assessment/recommendation:


77 female history of seizure disorder, hypertension, fluid overload status now, 

complicated because of sepsis, C. difficile colitis, respiratory failure


Improving at this time.


Reduce her Lasix


Continue the current treatment








Objective





- Vital Signs/Intake and Output


Vital Signs (last 24 hours): 


 











Temp Pulse Resp BP Pulse Ox


 


 97 F L  92 H  22   117/69   99 


 


 03/24/17 08:00  03/24/17 11:00  03/24/17 11:00  03/24/17 11:00  03/24/17 11:00








Intake and Output: 


 











 03/24/17 03/24/17





 06:59 18:59


 


Intake Total 480 710


 


Output Total 725 265


 


Balance -245 445














- Medications


Medications: 


 Current Medications





Acetaminophen (Tylenol 325mg Tab)  650 mg PO Q4 PRN


   PRN Reason: Fever >100.4 F


   Last Admin: 03/16/17 09:14 Dose:  650 mg


Famotidine (Pepcid)  20 mg IVP Q12 [facility]


   Last Admin: 03/24/17 09:25 Dose:  20 mg


Furosemide (Lasix)  40 mg IVP DAILY [facility]


Heparin Sodium (Porcine) (Heparin)  5,000 units SC Q12 [facility]


   Last Admin: 03/24/17 09:24 Dose:  5,000 units


Metronidazole (Flagyl)  100 mls @ 100 mls/hr IVPB Q8 [facility]


   Last Admin: 03/24/17 06:00 Dose:  100 mls/hr


Rifaximin (Xifaxan)  550 mg PO BID [facility]


   Last Admin: 03/24/17 09:26 Dose:  550 mg


Saccharomyces Boulardii (Florastor)  250 mg PO TID [facility]


   Last Admin: 03/24/17 09:24 Dose:  250 mg


Vancomycin HCl (Vancocin (Oral Or Rectal Use))  500 mg PO QID [facility]


   Last Admin: 03/24/17 09:27 Dose:  500 mg











- Labs


Labs: 


 





 03/24/17 08:19 





 03/24/17 08:19 





 











PT  14.8 SECONDS (9.7-12.2)  H  03/15/17  01:01    


 


INR  1.3   03/15/17  01:01    


 


APTT  37 SECONDS (21-34)  H  03/15/17  01:01

## 2017-03-24 NOTE — RAD
HISTORY:

resp distress f/u  



COMPARISON:

03/28/2017 



FINDINGS:



LUNGS:

Mild hazy opacity in the lung bases.  This remains essentially 

unchanged.



PLEURA:

Possible small pleural effusion on the left.



CARDIOVASCULAR:

Enlarged cardiomediastinal silhouette.



OSSEOUS STRUCTURES:

The osseous structures demonstrate degenerative changes. 



VISUALIZED UPPER ABDOMEN:

Upper abdomen is suboptimally evaluated. Rounded calcific opacity in 

the left upper quadrant again seen, stable. 



OTHER FINDINGS:

Presumed vascular catheter on the right with the distal tip of the 

catheter overlying the projection of the SVC/right atrial junction.



IMPRESSION:

Mild hazy opacity in the lung bases, essentially unchanged.

## 2017-03-24 NOTE — CP.PCM.PN
Subjective





- Date & Time of Evaluation


Date of Evaluation: 03/24/17


Time of Evaluation: 13:08





- Subjective


Subjective: 


notes done





Objective





- Vital Signs/Intake and Output


Vital Signs (last 24 hours): 


 











Temp Pulse Resp BP Pulse Ox


 


 99 F   101 H  26 H  115/55 L  99 


 


 03/24/17 16:00  03/24/17 19:00  03/24/17 19:00  03/24/17 19:00  03/24/17 19:00








Intake and Output: 


 











 03/24/17 03/25/17





 18:59 06:59


 


Intake Total 1430 150


 


Output Total 2050 375


 


Balance -620 -225














- Medications


Medications: 


 Current Medications





Acetaminophen (Tylenol 325mg Tab)  650 mg PO Q4 PRN


   PRN Reason: Fever >100.4 F


   Last Admin: 03/16/17 09:14 Dose:  650 mg


Divalproex Sodium (Depakote Er)  250 mg PO DAILY [facility]


Divalproex Sodium (Depakote Er)  500 mg PO HS [facility]


   Last Admin: 03/24/17 23:28 Dose:  Not Given


Famotidine (Pepcid)  20 mg IVP Q12 [facility]


   Last Admin: 03/24/17 22:01 Dose:  20 mg


Furosemide (Lasix)  40 mg IVP DAILY [facility]


Heparin Sodium (Porcine) (Heparin)  5,000 units SC Q12 [facility]


   Last Admin: 03/24/17 22:00 Dose:  5,000 units


Metronidazole (Flagyl)  100 mls @ 100 mls/hr IVPB Q8 [facility]


   Last Admin: 03/24/17 22:02 Dose:  100 mls/hr


Levetiracetam 500 mg/ Dextrose  105 mls @ 420 mls/hr IVPB Q12H [facility]


Lamotrigine (Lamictal)  50 mg PO DAILY [facility]


Rifaximin (Xifaxan)  550 mg PO BID [facility]


   Last Admin: 03/24/17 17:33 Dose:  550 mg


Saccharomyces Boulardii (Florastor)  250 mg PO TID [facility]


   Last Admin: 03/24/17 17:32 Dose:  250 mg


Vancomycin HCl (Vancocin (Oral Or Rectal Use))  500 mg PO QID [facility]


   Last Admin: 03/24/17 23:00 Dose:  500 mg











- Labs


Labs: 


 





 03/24/17 08:19 





 03/24/17 08:19 





 











PT  14.8 SECONDS (9.7-12.2)  H  03/15/17  01:01    


 


INR  1.3   03/15/17  01:01    


 


APTT  37 SECONDS (21-34)  H  03/15/17  01:01

## 2017-03-24 NOTE — RAD
HISTORY:

S/P PICC LINE INSERTION  



COMPARISON:

03/24/2017 



FINDINGS:



LUNGS:

Hazy opacity in the right lung base.



PLEURA:

No significant pleural effusion identified, no pneumothorax apparent.



CARDIOVASCULAR:

Normal.



OSSEOUS STRUCTURES:

The osseous structures demonstrate degenerative changes. 



VISUALIZED UPPER ABDOMEN:

Upper abdomen is suboptimally evaluated.



OTHER FINDINGS:

Interval introduction of right-sided PICC with the distal tip of the 

catheter overlying the projection of the SVC/right atrial junction.



Previously existing right-sided presumed vascular catheter unchanged 

in position. 



IMPRESSION:

Interval introduction of right-sided PICC with the distal tip of the 

catheter overlying the projection of the SVC/right atrial junction.



Hazy opacity in the right lung base again noted.

## 2017-03-24 NOTE — CP.PCM.PN
Subjective





- Date & Time of Evaluation


Date of Evaluation: 03/24/17


Time of Evaluation: 15:40





- Subjective


Subjective: 


F/U colitis.


More alert.  Feeling better.  More diiarrhea today.


Denies RB, melena, fever, chills, abdom pain, SZ, CP{, SOB, hematuria, 

hemoptysis.





Objective





- Vital Signs/Intake and Output


Vital Signs (last 24 hours): 


 











Temp Pulse Resp BP Pulse Ox


 


 97.1 F L  102 H  25 H  120/89   97 


 


 03/24/17 12:00  03/24/17 14:00  03/24/17 14:00  03/24/17 14:00  03/24/17 14:00








Intake and Output: 


 











 03/24/17 03/24/17





 06:59 18:59


 


Intake Total 480 1230


 


Output Total 725 1300


 


Balance -245 -70














- Medications


Medications: 


 Current Medications





Acetaminophen (Tylenol 325mg Tab)  650 mg PO Q4 PRN


   PRN Reason: Fever >100.4 F


   Last Admin: 03/16/17 09:14 Dose:  650 mg


Famotidine (Pepcid)  20 mg IVP Q12 [facility]


   Last Admin: 03/24/17 09:25 Dose:  20 mg


Furosemide (Lasix)  40 mg IVP DAILY [facility]


Heparin Sodium (Porcine) (Heparin)  5,000 units SC Q12 [facility]


   Last Admin: 03/24/17 09:24 Dose:  5,000 units


Metronidazole (Flagyl)  100 mls @ 100 mls/hr IVPB Q8 [facility]


   Last Admin: 03/24/17 13:13 Dose:  100 mls/hr


Rifaximin (Xifaxan)  550 mg PO BID [facility]


   Last Admin: 03/24/17 09:26 Dose:  550 mg


Saccharomyces Boulardii (Florastor)  250 mg PO TID [facility]


   Last Admin: 03/24/17 13:34 Dose:  250 mg


Vancomycin HCl (Vancocin (Oral Or Rectal Use))  500 mg PO QID [facility]


   Last Admin: 03/24/17 13:34 Dose:  500 mg











- Labs


Labs: 


 





 03/24/17 08:19 





 03/24/17 08:19 





 











PT  14.8 SECONDS (9.7-12.2)  H  03/15/17  01:01    


 


INR  1.3   03/15/17  01:01    


 


APTT  37 SECONDS (21-34)  H  03/15/17  01:01    














- Constitutional


Appears: Non-toxic





- Respiratory Exam


Respiratory Exam: Clear to Ausculation Bilateral





- Cardiovascular Exam


Cardiovascular Exam: RRR





- GI/Abdominal Exam


GI & Abdominal Exam: Soft, Normal Bowel Sounds.  absent: Tenderness





- Extremities Exam


Extremities Exam: Pedal Edema





- Neurological Exam


Neurological Exam: Alert, Awake, Oriented x3





Assessment and Plan


(1) Acute respiratory failure


Assessment & Plan: 


Better


Status: Acute





(2) Sepsis


Assessment & Plan: 


wbc improving


Status: Acute





(3) Abdominal pain


Assessment & Plan: 


colitis.  c diff.  Improving


Status: Acute





(4) GERD (gastroesophageal reflux disease)


Status: Suspected





(5) Colitis


Assessment & Plan: 


c diff- improving.  WBC improving


Status: Acute





(6) Diarrhea


Status: Acute

## 2017-03-25 NOTE — CP.PCM.PN
Subjective





- Date & Time of Evaluation


Date of Evaluation: 03/25/17


Time of Evaluation: 12:31





- Subjective


Subjective: 


INFECTIOUS DISEAE PROGRESS NOTE


JAGDEEP JUÁREZ MD, FACP


3/24 & 3/25/2017


ICU/CCU #[id number]





PATIENT RESPONDED TO IV LASIX AND FLUID MANAGEMENT


EXTUBATED A FEW DAYS AGO AND BREATHI G BETTER, BUT STILL WITH 3= oitting edema 

bilateral legs





PT WAS STABLE AROUND 7:30 WHEN EVALAUTED.





THIS MORNING HER BROTHER STATES THAT LAST NIGHT, AROUND 10:00 PM SHE DEVELOPED 

A SEIZURE, ON REVIEW HER CHRONIC SEIZURE MEDS WHERE NOT TOTALLY CORRECTED.


ERGO, NOT TO MENTION THEREFORE AND HENCE, I HAVE INFORMED PHARMACY OF HER MEDS 

AS PER HER [facility]:


1) DEPAKOTE 250 AM AND DEPAKOTE 500 MG PO EVENING, ALSO


2) LAMICTAL 50MG PO AM DAILY, AND 


3) MYSOLINE 50MG PO AM DAILY AND 75 MG PO EVENING- THESE ORDERS ARE CURRENT AS 

PER DR VICKY PARKS [phone].





Objective





- Vital Signs/Intake and Output


Vital Signs (last 24 hours): 


 











Temp Pulse Resp BP Pulse Ox


 


 98 F   82   17   113/52 L  100 


 


 03/25/17 08:00  03/25/17 12:01  03/25/17 12:01  03/25/17 12:01  03/25/17 12:01








Intake and Output: 


 











 03/25/17 03/25/17





 06:59 18:59


 


Intake Total 770 120


 


Output Total 1650 


 


Balance -880 120














- Medications


Medications: 


 Current Medications





Acetaminophen (Tylenol 325mg Tab)  650 mg PO Q4 PRN


   PRN Reason: Fever >100.4 F


   Last Admin: 03/16/17 09:14 Dose:  650 mg


Divalproex Sodium (Depakote Er)  500 mg PO HS [facility]


Famotidine (Pepcid)  20 mg IVP Q12 [facility]


   Last Admin: 03/25/17 10:34 Dose:  20 mg


Furosemide (Lasix)  40 mg IVP DAILY [facility]


   Last Admin: 03/25/17 10:34 Dose:  40 mg


Heparin Sodium (Porcine) (Heparin)  5,000 units SC Q12 [facility]


   Last Admin: 03/25/17 10:33 Dose:  5,000 units


Metronidazole (Flagyl)  100 mls @ 100 mls/hr IVPB Q8 [facility]


   Last Admin: 03/25/17 05:38 Dose:  100 mls/hr


Lamotrigine (Lamictal)  50 mg PO DAILY [facility]


   Last Admin: 03/25/17 10:35 Dose:  50 mg


Primidone (Mysoline)  75 mg PO [facility]


Rifaximin (Xifaxan)  550 mg PO BID [facility]


   Last Admin: 03/25/17 10:34 Dose:  550 mg


Saccharomyces Boulardii (Florastor)  250 mg PO TID [facility]


   Last Admin: 03/25/17 10:33 Dose:  250 mg


Vancomycin HCl (Vancocin (Oral Or Rectal Use))  500 mg PO QID [facility]


   Last Admin: 03/25/17 10:35 Dose:  500 mg











- Labs


Labs: 


 





 03/25/17 07:45 





 03/25/17 06:46 





 











PT  14.8 SECONDS (9.7-12.2)  H  03/15/17  01:01    


 


INR  1.3   03/15/17  01:01    


 


APTT  37 SECONDS (21-34)  H  03/15/17  01:01    














- Constitutional


Appears: Non-toxic, Older Than Stated Age, Chronically Ill, Other (APPEARS 

SEDATED)





- Head Exam


Head Exam: ATRAUMATIC





- Eye Exam


Eye Exam: Normal appearance





- ENT Exam


ENT Exam: Mucous Membranes Moist





- Neck Exam


Neck Exam: Normal Inspection





- Respiratory Exam


Respiratory Exam: Decreased Breath Sounds, NORMAL BREATHING PATTERN





- Cardiovascular Exam


Cardiovascular Exam: REGULAR RHYTHM





- GI/Abdominal Exam


GI & Abdominal Exam: Soft, Normal Bowel Sounds.  absent: Tenderness, Hernia, 

Organomegaly, Rebound





- Rectal Exam


Rectal Exam: Deferred





- Neurological Exam


Neurological Exam: Alert, Awake





- Psychiatric Exam


Psychiatric exam: Normal Affect





- Skin


Skin Exam: Warm





Assessment and Plan


(1) C. difficile colitis


Status: Acute





(2) Acute respiratory failure


Status: Acute





(3) GERD (gastroesophageal reflux disease)


Status: Suspected





(4) Knee injury


Status: Chronic





(5) Sepsis


Status: Acute





(6) Bladder incontinence


Status: Chronic





(7) Abdominal pain


Status: Acute





(8) Seizure disorder


Assessment & Plan: 


ADJUSTED HER MEDS.


Status: Acute

## 2017-03-25 NOTE — CP.PCM.PN
Subjective





- Date & Time of Evaluation


Date of Evaluation: 03/25/17


Time of Evaluation: 23:30





- Subjective


Subjective: 


pt is awake and responding


eating ok


but having leg swelling


no chest pain


still being monitored in ICU





 











Temp Pulse Resp BP Pulse Ox


 


 98.2 F   97 H  26 H  87/41 L  96 


 


 03/25/17 16:00  03/25/17 19:36  03/25/17 19:36  03/25/17 18:36  03/25/17 19:36





chest good air entry


regular hs


abd soft


edema noted


 





 03/25/17 07:45 





 03/25/17 06:46 





a/p:


pt with colitis


seizures


oa


severe sepsis


continue the current treatment


BP is on the low side





Objective





- Vital Signs/Intake and Output


Vital Signs (last 24 hours): 


 











Temp Pulse Resp BP Pulse Ox


 


 98.2 F   97 H  26 H  87/41 L  96 


 


 03/25/17 16:00  03/25/17 19:36  03/25/17 19:36  03/25/17 18:36  03/25/17 19:36








Intake and Output: 


 











 03/25/17 03/26/17





 18:59 06:59


 


Intake Total 1700 


 


Output Total 3000 300


 


Balance -1300 -300














- Medications


Medications: 


 Current Medications





Acetaminophen (Tylenol 325mg Tab)  650 mg PO Q4 PRN


   PRN Reason: Fever >100.4 F


   Last Admin: 03/16/17 09:14 Dose:  650 mg


Divalproex Sodium (Depakote Er)  500 mg PO HS [facility]


   Last Admin: 03/25/17 21:31 Dose:  500 mg


Divalproex Sodium (Depakote Er)  250 mg PO DAILY [facility]


Famotidine (Pepcid)  20 mg IVP Q12 [facility]


   Last Admin: 03/25/17 21:29 Dose:  20 mg


Furosemide (Lasix)  40 mg IVP DAILY [facility]


   Last Admin: 03/25/17 10:34 Dose:  40 mg


Guaifenesin (Robitussin)  200 mg PO Q4H [facility]


   PRN Reason: Cough and congestion


   Last Admin: 03/25/17 21:28 Dose:  200 mg


Heparin Sodium (Porcine) (Heparin)  5,000 units SC Q12 [facility]


   Last Admin: 03/25/17 21:28 Dose:  5,000 units


Metronidazole (Flagyl)  100 mls @ 100 mls/hr IVPB Q8 [facility]


   Last Admin: 03/25/17 21:31 Dose:  100 mls/hr


Lamotrigine (Lamictal)  50 mg PO DAILY [facility]


   Last Admin: 03/25/17 10:35 Dose:  50 mg


Primidone (Mysoline)  75 mg PO HS [facility]


   Last Admin: 03/25/17 21:30 Dose:  75 mg


Primidone (Mysoline)  50 mg PO DAILY [facility]


Rifaximin (Xifaxan)  550 mg PO BID [facility]


   Last Admin: 03/25/17 17:48 Dose:  550 mg


Saccharomyces Boulardii (Florastor)  250 mg PO TID [facility]


   Last Admin: 03/25/17 17:48 Dose:  250 mg


Vancomycin HCl (Vancocin (Oral Or Rectal Use))  500 mg PO QID [facility]


   Last Admin: 03/25/17 21:31 Dose:  500 mg











- Labs


Labs: 


 





 03/25/17 07:45 





 03/25/17 06:46 





 











PT  14.8 SECONDS (9.7-12.2)  H  03/15/17  01:01    


 


INR  1.3   03/15/17  01:01    


 


APTT  37 SECONDS (21-34)  H  03/15/17  01:01

## 2017-03-25 NOTE — RAD
HISTORY:

prior intubation, resp distress  



COMPARISON:

3/24/2017 



FINDINGS:



LUNGS:

Hazy opacity at both lung bases, right greater than left. This may be 

due to alveolar opacity or pleural effusion common dependent.



PLEURA:

Small bilateral pleural effusions.



CARDIOVASCULAR:

Removal of right IJ central venous catheter.



OSSEOUS STRUCTURES:

No significant abnormalities.



VISUALIZED UPPER ABDOMEN:

Normal.



OTHER FINDINGS:

None.



IMPRESSION:

Hazy opacity at lung bases may be due to alveolar opacity or 

dependent pleural fluid. Bilateral pleural effusion is noted.  Status 

post removal of right IJ central venous catheter.

## 2017-03-25 NOTE — CP.CCUPN
CCU Subjective





- Physician Review


Events Since Last Encounter (Free Text): 





03/25/17 13:32


patient had a seizure episode last night, now clinically stable.





CCU Objective





- Vital Signs / Intake & Output


Vital Signs (Last 4 hours): 


Vital Signs











  Temp Pulse Resp BP Pulse Ox


 


 03/25/17 12:35   93 H  24  106/56 L  97


 


 03/25/17 12:01   82  17  113/52 L  100


 


 03/25/17 12:00  98.1 F    


 


 03/25/17 11:35   101 H  22  113/52 L  98


 


 03/25/17 10:35   94 H  26 H  101/55 L  99


 


 03/25/17 10:34     101/50 L 


 


 03/25/17 09:35   95 H  29 H  97/61 L  98











Intake and Output (Last 8hrs): 


 Intake & Output











 03/24/17 03/25/17 03/25/17





 22:59 06:59 14:59


 


Intake Total 


 


Output Total 6973 088 1619


 


Balance -1005 -625 -200


 


Intake:   


 


  Intake, IV Amount 100 200 400


 


    Right PICC 100 200 400


 


  Oral 670 100 600


 


Output:   


 


  Urine 3344 119 2525


 


    Urethral (Cuellar) [phone]


 


Other:   


 


  # Bowel Movements 1 1 1














- Physical Exam


Head: Positive for: Atraumatic, Normocephalic


Pupils: Positive for: PERRL


Extroacular Muscles: Positive for: EOMI


Conjunctiva: Positive for: Normal


Ears: Positive for: Normal


Mouth: Positive for: Moist Mucous Membranes


Respiratory/Chest: Positive for: Clear to Auscultation.  Negative for: Wheezes


Cardiovascular: Positive for: Regular Rate and Rhythm, Normal S1, S2


Abdomen: Positive for: Normal Bowel Sounds.  Negative for: Tenderness, 

Peritoneal Signs, Guarding


Upper Extremity: Positive for: NORMAL PULSES


Lower Extremity: Negative for: Edema


Neurological: Positive for: CN II-XII Intact, Speech Normal


Skin: Positive for: Warm, Dry.  Negative for: Rashes


Psychiatric: Positive for: Oriented x 3, Normal Insight, Normal Concentration, 

Normal Affect, Normal Mood.  Negative for: Alert





- Medications


Active Medications: 


Active Medications











Generic Name Dose Route Start Last Admin





  Trade Name Freq  PRN Reason Stop Dose Admin


 


Acetaminophen  650 mg 03/15/17 04:18 03/16/17 09:14





  Tylenol 325mg Tab  PO   650 mg





  Q4 PRN   Administration





  Fever >100.4 F   


 


Divalproex Sodium  500 mg 03/25/17 22:00  





  Depakote Er  PO   





  HS [facility]   


 


Divalproex Sodium  250 mg 03/26/17 10:00  





  Depakote Er  PO   





  DAILY [facility]   


 


Famotidine  20 mg 03/15/17 10:00 03/25/17 10:34





  Pepcid  IVP   20 mg





  Q12 [facility]   Administration


 


Furosemide  40 mg 03/25/17 10:00 03/25/17 10:34





  Lasix  IVP   40 mg





  DAILY [facility]   Administration


 


Heparin Sodium (Porcine)  5,000 units 03/19/17 22:00 03/25/17 10:33





  Heparin  SC   5,000 units





  Q12 [facility]   Administration


 


Metronidazole  100 mls @ 100 mls/hr 03/15/17 06:45 03/25/17 05:38





  Flagyl  IVPB   100 mls/hr





  Q8 [facility]   Administration


 


Lamotrigine  50 mg 03/25/17 10:00 03/25/17 10:35





  Lamictal  PO   50 mg





  DAILY [facility]   Administration


 


Primidone  75 mg 03/25/17 22:00  





  Mysoline  PO   





  HS [facility]   


 


Primidone  50 mg 03/26/17 10:00  





  Mysoline  PO   





  DAILY [facility]   


 


Rifaximin  550 mg 03/17/17 10:45 03/25/17 10:34





  Xifaxan  PO   550 mg





  BID [facility]   Administration


 


Saccharomyces Boulardii  250 mg 03/22/17 10:00 03/25/17 10:33





  Florastor  PO   250 mg





  TID [facility]   Administration


 


Vancomycin HCl  500 mg 03/15/17 10:00 03/25/17 10:35





  Vancocin (Oral Or Rectal Use)  PO   500 mg





  QID [facility]   Administration














- Patient Studies


Lab Studies: 


 Microbiology Studies











 03/22/17 20:42 MRSA Culture (Admit) - Final





 Naris    MRSA NOT DETECTED








 Lab Studies











  03/25/17 03/25/17 03/25/17 Range/Units





  09:45 07:45 06:46 


 


WBC   16.9 H   (4.8-10.8)  K/uL


 


RBC   3.92   (3.80-5.20)  Mil/uL


 


Hgb   10.5 L   (11.0-16.0)  g/dL


 


Hct   33.3 L   (34.0-47.0)  %


 


MCV   85.2   (81.0-99.0)  fL


 


MCH   26.9 L   (27.0-31.0)  pg


 


MCHC   31.6 L   (33.0-37.0)  g/dL


 


RDW   15.4 H   (11.5-14.5)  %


 


Plt Count   330  D   (130-400)  K/uL


 


MPV   7.9   (7.2-11.7)  fL


 


Neut % (Auto)   78.5 H   (50.0-75.0)  %


 


Lymph % (Auto)   14.3 L   (20.0-40.0)  %


 


Mono % (Auto)   6.1   (0.0-10.0)  %


 


Eos % (Auto)   0.5   (0.0-4.0)  %


 


Baso % (Auto)   0.6   (0.0-2.0)  %


 


Neut #   13.3 H   (1.8-7.0)  K/uL


 


Lymph #   2.4   (1.0-4.3)  K/uL


 


Mono #   1.0 H   (0.0-0.8)  K/uL


 


Eos #   0.1   (0.0-0.7)  K/uL


 


Baso #   0.1   (0.0-0.2)  K/uL


 


Sodium    130 L  (132-148)  mmol/L


 


Potassium    3.8  (3.6-5.2)  mmol/L


 


Chloride    96 L  ()  mmol/L


 


Carbon Dioxide    24  (22-30)  mmol/L


 


Anion Gap    14  (10-20)  


 


BUN    9  (7-17)  mg/dL


 


Creatinine    0.4 L  (0.7-1.2)  MG/DL


 


Est GFR (African Amer)    > 60  


 


Est GFR (Non-Af Amer)    > 60  


 


Random Glucose    69  ()  mg/dL


 


Calcium    7.1 L  (8.6-10.4)  mg/dl


 


Phosphorus    3.9  (2.5-4.5)  mg/dL


 


Magnesium    1.5 L  (1.6-2.3)  mg/dL


 


Total Bilirubin    0.6  (0.2-1.3)  mg/dL


 


AST    33  (14-36)  U/L


 


ALT    25  (9-52)  U/L


 


Alkaline Phosphatase    34 L  ()  U/L


 


Total Protein    4.7 L  (6.3-8.3)  g/dL


 


Albumin    2.1 L  (3.5-5.0)  g/dL


 


Globulin    2.6  (2.2-3.9)  gm/dL


 


Albumin/Globulin Ratio    0.8 L  (1.0-2.1)  


 


Valproic Acid  < 10.0 L    (50.0-100.0)  ug/mL














  03/24/17 Range/Units





  23:57 


 


WBC   (4.8-10.8)  K/uL


 


RBC   (3.80-5.20)  Mil/uL


 


Hgb   (11.0-16.0)  g/dL


 


Hct   (34.0-47.0)  %


 


MCV   (81.0-99.0)  fL


 


MCH   (27.0-31.0)  pg


 


MCHC   (33.0-37.0)  g/dL


 


RDW   (11.5-14.5)  %


 


Plt Count   (130-400)  K/uL


 


MPV   (7.2-11.7)  fL


 


Neut % (Auto)   (50.0-75.0)  %


 


Lymph % (Auto)   (20.0-40.0)  %


 


Mono % (Auto)   (0.0-10.0)  %


 


Eos % (Auto)   (0.0-4.0)  %


 


Baso % (Auto)   (0.0-2.0)  %


 


Neut #   (1.8-7.0)  K/uL


 


Lymph #   (1.0-4.3)  K/uL


 


Mono #   (0.0-0.8)  K/uL


 


Eos #   (0.0-0.7)  K/uL


 


Baso #   (0.0-0.2)  K/uL


 


Sodium   (132-148)  mmol/L


 


Potassium   (3.6-5.2)  mmol/L


 


Chloride   ()  mmol/L


 


Carbon Dioxide   (22-30)  mmol/L


 


Anion Gap   (10-20)  


 


BUN   (7-17)  mg/dL


 


Creatinine   (0.7-1.2)  MG/DL


 


Est GFR (African Amer)   


 


Est GFR (Non-Af Amer)   


 


Random Glucose   ()  mg/dL


 


Calcium   (8.6-10.4)  mg/dl


 


Phosphorus  3.8  (2.5-4.5)  mg/dL


 


Magnesium  1.5 L  (1.6-2.3)  mg/dL


 


Total Bilirubin   (0.2-1.3)  mg/dL


 


AST   (14-36)  U/L


 


ALT   (9-52)  U/L


 


Alkaline Phosphatase   ()  U/L


 


Total Protein   (6.3-8.3)  g/dL


 


Albumin   (3.5-5.0)  g/dL


 


Globulin   (2.2-3.9)  gm/dL


 


Albumin/Globulin Ratio   (1.0-2.1)  


 


Valproic Acid   (50.0-100.0)  ug/mL








 Laboratory Results - last 24 hr











  03/24/17 03/25/17 03/25/17





  23:57 06:46 07:45


 


WBC    16.9 H


 


RBC    3.92


 


Hgb    10.5 L


 


Hct    33.3 L


 


MCV    85.2


 


MCH    26.9 L


 


MCHC    31.6 L


 


RDW    15.4 H


 


Plt Count    330  D


 


MPV    7.9


 


Neut % (Auto)    78.5 H


 


Lymph % (Auto)    14.3 L


 


Mono % (Auto)    6.1


 


Eos % (Auto)    0.5


 


Baso % (Auto)    0.6


 


Neut #    13.3 H


 


Lymph #    2.4


 


Mono #    1.0 H


 


Eos #    0.1


 


Baso #    0.1


 


Sodium   130 L 


 


Potassium   3.8 


 


Chloride   96 L 


 


Carbon Dioxide   24 


 


Anion Gap   14 


 


BUN   9 


 


Creatinine   0.4 L 


 


Est GFR ( Amer)   > 60 


 


Est GFR (Non-Af Amer)   > 60 


 


Random Glucose   69 


 


Calcium   7.1 L 


 


Phosphorus  3.8  3.9 


 


Magnesium  1.5 L  1.5 L 


 


Total Bilirubin   0.6 


 


AST   33 


 


ALT   25 


 


Alkaline Phosphatase   34 L 


 


Total Protein   4.7 L 


 


Albumin   2.1 L 


 


Globulin   2.6 


 


Albumin/Globulin Ratio   0.8 L 


 


Valproic Acid   














  03/25/17





  09:45


 


WBC 


 


RBC 


 


Hgb 


 


Hct 


 


MCV 


 


MCH 


 


MCHC 


 


RDW 


 


Plt Count 


 


MPV 


 


Neut % (Auto) 


 


Lymph % (Auto) 


 


Mono % (Auto) 


 


Eos % (Auto) 


 


Baso % (Auto) 


 


Neut # 


 


Lymph # 


 


Mono # 


 


Eos # 


 


Baso # 


 


Sodium 


 


Potassium 


 


Chloride 


 


Carbon Dioxide 


 


Anion Gap 


 


BUN 


 


Creatinine 


 


Est GFR ( Amer) 


 


Est GFR (Non-Af Amer) 


 


Random Glucose 


 


Calcium 


 


Phosphorus 


 


Magnesium 


 


Total Bilirubin 


 


AST 


 


ALT 


 


Alkaline Phosphatase 


 


Total Protein 


 


Albumin 


 


Globulin 


 


Albumin/Globulin Ratio 


 


Valproic Acid  < 10.0 L














Review of Systems





- Review of Systems


All systems: reviewed and no additional remarkable complaints except





- Gastrointestinal


Gastrointestinal: Diarrhea





- Neurological


Neurological: UNREMARKABLE





Critical Care Progress Note





- Nutrition


Nutrition: 


 Nutrition











 Category Date Time Status


 


 Pureed [Dysphagia/Modified Consistency Diet] [DIET] Diets  03/20/17 Breakfast 

Active














Assessment/Plan


(1) C. difficile colitis


Assessment and plan: 


77 year old female with a past medical history of HTN, arthritis, anxiety, 

seizures, back problems and gall bladder disease presents with C-diff colitis.  

Patient was placed on antibiotic therapy, with signs of improvement. Patient 

was transferred to the medical floor earlier in the week however had an episode 

of respiratory distress last night for which she was placed on BiPAP and 

upgraded for critical care monitoring.





Neuro: Alert and following commands, now stable s/p seizure episode last night.

  Restarted on all seizure meds prior to episode, continue Depakote, Lamictal 

and Primidone.





Pulm: Acute respiratory failure secondary to fluid overload, starting diuresis. 

Patient already improving now on nasal cannula oxygen.





CV: hemodynamically stable.





Hem: Anemia of critical illness





Renal: No acute issues, urine output within normal limits.





Endo: No acute issues





GI: Pured diet, with thin liquids.





ID: sepsis from C. difficile colitis, continue Vanco by mouth, Flagyl IV and 

Rifaximin.





DVT proph - heparin subcutaneous


GI proph - Pepcid


cuellar for strict I/O's during acute illness


Code status - full code





Crtical Care Time spent 35 minutes


Multi-disciplinary rounds were performed with house staff, nursing, speech 

therapy, respiratory therapy, pharmacy and nutrition with integrated input from 

the primary team/attending and other consulting services.  The documented time 

is cumulative and includes review of patient data/exams/labs/chart review and 

examination of the patient on rounds and throughout the day; time is exclusive 

of any procedures or teaching time.


Current Visit: Yes   Status: Acute

## 2017-03-25 NOTE — CP.PCM.PN
Subjective





- Date & Time of Evaluation


Date of Evaluation: 03/25/17


Time of Evaluation: 13:24





- Subjective


Subjective: 


Patient had a seizure yesterday.  Today, she is complaining of fatigue.  She 

continues to have diarrhea, twice so far today.  She denies having nausea or 

vomiting.  The WBC count is down to 16.9.





Objective





- Vital Signs/Intake and Output


Vital Signs (last 24 hours): 


 











Temp Pulse Resp BP Pulse Ox


 


 98.1 F   93 H  24   106/56 L  97 


 


 03/25/17 12:00  03/25/17 12:35  03/25/17 12:35  03/25/17 12:35  03/25/17 12:35








Intake and Output: 


 











 03/25/17 03/25/17





 06:59 18:59


 


Intake Total 770 1000


 


Output Total 1650 1200


 


Balance -880 -200














- Medications


Medications: 


 Current Medications





Acetaminophen (Tylenol 325mg Tab)  650 mg PO Q4 PRN


   PRN Reason: Fever >100.4 F


   Last Admin: 03/16/17 09:14 Dose:  650 mg


Divalproex Sodium (Depakote Er)  500 mg PO HS [facility]


Divalproex Sodium (Depakote Er)  250 mg PO DAILY [facility]


Famotidine (Pepcid)  20 mg IVP Q12 [facility]


   Last Admin: 03/25/17 10:34 Dose:  20 mg


Furosemide (Lasix)  40 mg IVP DAILY [facility]


   Last Admin: 03/25/17 10:34 Dose:  40 mg


Heparin Sodium (Porcine) (Heparin)  5,000 units SC Q12 [facility]


   Last Admin: 03/25/17 10:33 Dose:  5,000 units


Metronidazole (Flagyl)  100 mls @ 100 mls/hr IVPB Q8 [facility]


   Last Admin: 03/25/17 05:38 Dose:  100 mls/hr


Lamotrigine (Lamictal)  50 mg PO DAILY [facility]


   Last Admin: 03/25/17 10:35 Dose:  50 mg


Primidone (Mysoline)  75 mg PO HS [facility]


Primidone (Mysoline)  50 mg PO DAILY [facility]


Rifaximin (Xifaxan)  550 mg PO BID [facility]


   Last Admin: 03/25/17 10:34 Dose:  550 mg


Saccharomyces Boulardii (Florastor)  250 mg PO TID [facility]


   Last Admin: 03/25/17 10:33 Dose:  250 mg


Vancomycin HCl (Vancocin (Oral Or Rectal Use))  500 mg PO QID [facility]


   Last Admin: 03/25/17 10:35 Dose:  500 mg











- Labs


Labs: 


 





 03/25/17 07:45 





 03/25/17 06:46 





 











PT  14.8 SECONDS (9.7-12.2)  H  03/15/17  01:01    


 


INR  1.3   03/15/17  01:01    


 


APTT  37 SECONDS (21-34)  H  03/15/17  01:01    














- Constitutional


Appears: No Acute Distress





- Head Exam


Head Exam: ATRAUMATIC, NORMOCEPHALIC





- Eye Exam


Eye Exam: EOMI, PERRL





- Neck Exam


Neck Exam: absent: Lymphadenopathy, Thyromegaly





- Respiratory Exam


Respiratory Exam: NORMAL BREATHING PATTERN.  absent: Rales, Rhonchi, Wheezes





- Cardiovascular Exam


Cardiovascular Exam: REGULAR RHYTHM, +S1, +S2.  absent: Gallop, Rubs, Murmur





- GI/Abdominal Exam


GI & Abdominal Exam: Distended, Soft, Normal Bowel Sounds.  absent: Tenderness, 

Mass, Organomegaly





- Rectal Exam


Rectal Exam: Deferred





- Extremities Exam


Extremities Exam: absent: Calf Tenderness





Assessment and Plan


(1) C. difficile colitis


Assessment & Plan: 


The frequency of the diarrhea is improving, and the WBC count has fropped to 16,

900. Continue vancomycin.


Status: Acute

## 2017-03-26 NOTE — CP.PCM.PN
Subjective





- Date & Time of Evaluation


Date of Evaluation: 03/26/17


Time of Evaluation: 09:11





- Subjective


Subjective: 


Patient complains of cough.  She denies having nausea, vomiting, abdominal 

pain.  She had two loose bowel movements overnight, but none this morning.





Objective





- Vital Signs/Intake and Output


Vital Signs (last 24 hours): 


 











Temp Pulse Resp BP Pulse Ox


 


 98 F   84   19   105/53 L  99 


 


 03/26/17 04:00  03/26/17 08:04  03/26/17 08:04  03/26/17 08:04  03/26/17 08:04








Intake and Output: 


 











 03/26/17 03/26/17





 06:59 18:59


 


Intake Total 950 


 


Output Total 1600 


 


Balance -650 














- Medications


Medications: 


 Current Medications





Acetaminophen (Tylenol 325mg Tab)  650 mg PO Q4 PRN


   PRN Reason: Fever >100.4 F


   Last Admin: 03/16/17 09:14 Dose:  650 mg


Divalproex Sodium (Depakote Er)  500 mg PO HS [facility]


   Last Admin: 03/25/17 21:31 Dose:  500 mg


Divalproex Sodium (Depakote Er)  250 mg PO DAILY [facility]


Famotidine (Pepcid)  20 mg IVP Q12 [facility]


   Last Admin: 03/25/17 21:29 Dose:  20 mg


Guaifenesin (Robitussin)  200 mg PO Q4H PRN


   PRN Reason: Cough and congestion


   Last Admin: 03/26/17 01:34 Dose:  200 mg


Heparin Sodium (Porcine) (Heparin)  5,000 units SC Q12 [facility]


   Last Admin: 03/25/17 21:28 Dose:  5,000 units


Metronidazole (Flagyl)  100 mls @ 100 mls/hr IVPB Q8 [facility]


   Last Admin: 03/26/17 05:46 Dose:  100 mls/hr


Lamotrigine (Lamictal)  50 mg PO DAILY [facility]


   Last Admin: 03/25/17 10:35 Dose:  50 mg


Primidone (Mysoline)  75 mg PO HS [facility]


   Last Admin: 03/25/17 21:30 Dose:  75 mg


Primidone (Mysoline)  50 mg PO DAILY [facility]


Rifaximin (Xifaxan)  550 mg PO BID [facility]


   Last Admin: 03/25/17 17:48 Dose:  550 mg


Saccharomyces Boulardii (Florastor)  250 mg PO TID [facility]


   Last Admin: 03/25/17 17:48 Dose:  250 mg


Vancomycin HCl (Vancocin (Oral Or Rectal Use))  500 mg PO QID [facility]


   Last Admin: 03/25/17 21:31 Dose:  500 mg











- Labs


Labs: 


 





 03/26/17 06:18 





 03/26/17 06:18 





 











PT  14.8 SECONDS (9.7-12.2)  H  03/15/17  01:01    


 


INR  1.3   03/15/17  01:01    


 


APTT  37 SECONDS (21-34)  H  03/15/17  01:01    














- Constitutional


Appears: No Acute Distress





- Head Exam


Head Exam: ATRAUMATIC, NORMOCEPHALIC





- Eye Exam


Eye Exam: EOMI, PERRL





- Neck Exam


Neck Exam: absent: Lymphadenopathy, Thyromegaly





- Respiratory Exam


Respiratory Exam: NORMAL BREATHING PATTERN.  absent: Rales, Rhonchi, Wheezes





- Cardiovascular Exam


Cardiovascular Exam: REGULAR RHYTHM, +S1, +S2.  absent: Gallop, Rubs, Murmur





- GI/Abdominal Exam


GI & Abdominal Exam: Distended, Soft, Normal Bowel Sounds.  absent: Tenderness, 

Mass, Organomegaly





- Rectal Exam


Rectal Exam: Deferred





- Extremities Exam


Extremities Exam: Pedal Edema.  absent: Calf Tenderness


Additional comments: 


2+ edema bilateral





Assessment and Plan


(1) C. difficile colitis


Assessment & Plan: 


Frequency of bowel movements is improving.  The WBC count is down to 13.4.  

Continue vancomycin.


Status: Acute

## 2017-03-26 NOTE — PN
DATE: 03/26/2017



NEUROLOGICAL PROBLEM:  Seizures.



PHYSICAL EXAMINATION:

VITAL SIGNS:  Blood pressure 111/51, mean arterial pressure of 71, respiratory rate 19, temperature 9
8.3.

NEUROLOGIC:  The patient is comfortably sitting in the chair.  No seizures, more than 24-hour period.
  Current examination unchanged to compare with my previous examination.



The patient's antiepileptic drugs have been resumed, where she has been taking as per her neurologist
 in [place].



The patient will be followed closely with you.





__________________________________________

William Reyna MD







cc:



DD: 03/26/2017 12:59:58  [id number]

TT: 03/26/2017 13:26:00

Confirmation # [id number]

Dictation # [id number]

en

## 2017-03-26 NOTE — CP.PCM.PN
<Isabel Walsh - Last Filed: 03/26/17 22:13>





Subjective





- Date & Time of Evaluation


Date of Evaluation: 03/26/17


Time of Evaluation: 22:13





- Subjective


Subjective: 


House Doctor Note:





Patient complaining of suprapubic "burning". Patient with cuellar in place and 

adequate urine output as per nursing. Last UA and urine culture 3/15/17 were 

negative. Patient with history of allergic reaction to antibiotics. Will re-

order UA. Further management as per attending Dr. Ford. 





Isabel Walsh, DO - PGY 2








Objective





- Vital Signs/Intake and Output


Vital Signs (last 24 hours): 


 











Temp Pulse Resp BP Pulse Ox


 


 97.7 F   96 H  20   124/63   99 


 


 03/26/17 16:00  03/26/17 20:00  03/26/17 20:00  03/26/17 20:00  03/26/17 20:00








Intake and Output: 


 











 03/26/17 03/27/17





 18:59 06:59


 


Intake Total 320 100


 


Output Total  1550


 


Balance 320 -1450














- Medications


Medications: 


 Current Medications





Acetaminophen (Tylenol 325mg Tab)  650 mg PO Q4 PRN


   PRN Reason: Fever >100.4 F


   Last Admin: 03/16/17 09:14 Dose:  650 mg


Divalproex Sodium (Depakote Er)  500 mg PO HS [facility]


   Last Admin: 03/25/17 21:31 Dose:  500 mg


Divalproex Sodium (Depakote Er)  250 mg PO DAILY [facility]


   Last Admin: 03/26/17 10:01 Dose:  250 mg


Famotidine (Pepcid)  20 mg IVP Q12 [facility]


   Last Admin: 03/26/17 09:53 Dose:  20 mg


Guaifenesin (Robitussin)  200 mg PO Q4H [facility]


   Last Admin: 03/26/17 17:47 Dose:  200 mg


Heparin Sodium (Porcine) (Heparin)  5,000 units SC Q12 [facility]


   Last Admin: 03/26/17 10:00 Dose:  5,000 units


Metronidazole (Flagyl)  100 mls @ 100 mls/hr IVPB Q8 [facility]


   Last Admin: 03/26/17 15:00 Dose:  100 mls/hr


Lamotrigine (Lamictal)  50 mg PO DAILY [facility]


   Last Admin: 03/26/17 09:55 Dose:  50 mg


Primidone (Mysoline)  75 mg PO HS [facility]


   Last Admin: 03/25/17 21:30 Dose:  75 mg


Primidone (Mysoline)  50 mg PO DAILY [facility]


   Last Admin: 03/26/17 09:54 Dose:  50 mg


Rifaximin (Xifaxan)  550 mg PO BID [facility]


   Last Admin: 03/26/17 17:47 Dose:  550 mg


Saccharomyces Boulardii (Florastor)  250 mg PO TID [facility]


   Last Admin: 03/26/17 17:51 Dose:  250 mg


Vancomycin HCl (Vancocin (Oral Or Rectal Use))  500 mg PO QID [facility]


   Last Admin: 03/26/17 17:47 Dose:  500 mg











- Labs


Labs: 


 





 03/26/17 06:18 





 03/26/17 06:18 





 











PT  14.8 SECONDS (9.7-12.2)  H  03/15/17  01:01    


 


INR  1.3   03/15/17  01:01    


 


APTT  37 SECONDS (21-34)  H  03/15/17  01:01    














<Ab Ford - Last Filed: 03/27/17 19:04>





Subjective





- Date & Time of Evaluation


Date of Evaluation: 03/27/17


Time of Evaluation: 19:04





- Subjective


Subjective: 


Patient is currently doing well, denies any chest pain or shortness of breath 

abating is stable.


Leg swelling is improving.


Continue the current treatment.


Physical therapy transferred to floor.





Objective





- Vital Signs/Intake and Output


Vital Signs (last 24 hours): 


 











Temp Pulse Resp BP Pulse Ox


 


 99 F   99 H  21   122/56 L  99 


 


 03/27/17 16:00  03/27/17 16:00  03/27/17 16:00  03/27/17 16:00  03/27/17 16:00











- Medications


Medications: 


 Current Medications





Acetaminophen (Tylenol 325mg Tab)  650 mg PO Q4 PRN


   PRN Reason: Fever >100.4 F


   Last Admin: 03/16/17 09:14 Dose:  650 mg


Divalproex Sodium (Depakote Er)  500 mg PO HS [facility]


   Last Admin: 03/26/17 22:21 Dose:  500 mg


Divalproex Sodium (Depakote Er)  250 mg PO DAILY [facility]


   Last Admin: 03/27/17 09:39 Dose:  250 mg


Famotidine (Pepcid)  20 mg IVP Q12 [facility]


   Last Admin: 03/27/17 10:30 Dose:  20 mg


Guaifenesin (Robitussin)  200 mg PO Q4H [facility]


   Last Admin: 03/27/17 17:11 Dose:  200 mg


Heparin Sodium (Porcine) (Heparin)  5,000 units SC Q12 [facility]


   Last Admin: 03/27/17 09:35 Dose:  5,000 units


Metronidazole (Flagyl)  100 mls @ 100 mls/hr IVPB Q8 [facility]


   Last Admin: 03/27/17 14:00 Dose:  Not Given


Lamotrigine (Lamictal)  50 mg PO DAILY [facility]


   Last Admin: 03/27/17 09:36 Dose:  50 mg


Primidone (Mysoline)  75 mg PO HS [facility]


   Last Admin: 03/26/17 22:26 Dose:  75 mg


Primidone (Mysoline)  50 mg PO DAILY [facility]


   Last Admin: 03/27/17 09:37 Dose:  50 mg


Rifaximin (Xifaxan)  550 mg PO BID [facility]


   Last Admin: 03/27/17 17:11 Dose:  550 mg


Saccharomyces Boulardii (Florastor)  250 mg PO TID [facility]


   Last Admin: 03/27/17 17:12 Dose:  250 mg


Vancomycin HCl (Vancocin (Oral Or Rectal Use))  500 mg PO QID [facility]


   Last Admin: 03/27/17 17:09 Dose:  500 mg











- Labs


Labs: 


 





 03/26/17 06:18 





 03/26/17 06:18 





 











PT  14.8 SECONDS (9.7-12.2)  H  03/15/17  01:01    


 


INR  1.3   03/15/17  01:01    


 


APTT  37 SECONDS (21-34)  H  03/15/17  01:01

## 2017-03-27 NOTE — CON
DATE: 03/25/2017



CHIEF COMPLAINT:  The patient was admitted with a history of fever.  During the 
hospitalization, the patient had generalized tonic clonic seizures.  From 
neurological point of view, I was called in to evaluate her for further 
management.



HISTORY OF PRESENT ILLNESS:  The patient is a 77-year-old right-handed 
 female who is known to me from her previous hospitalization in the 
hospital, been known to have seizure disorder, been seeing [place] neurologist in [place], on 3 antiepileptic drugs for her seizures,  who did not have any seizures 
for  too many years in the past with this medication .  Following this admission
, however, one of the medications was not renewed.  The patient did develop 
generalized tonic-clonic activities, been witnessed.  Initially, patient was 
loaded with Keppra.



PAST MEDICAL HISTORY:  Anxiety disorder, arthritis, chronic back problem, 
gallbladder disease, hypertension, seizure disorder for the last more than 15 
years.



PAST SURGICAL HISTORY:  Cholecystectomy.



ALLERGIES:  INFLUENZA VACCINE, PENICILLIN AND VANCOMYCIN.



REVIEW OF SYSTEMS:  As per H and P.



MEDICATIONS:  Depakote, lamotrigine, primidone, Tylenol, vancomycin.



VITAL SIGNS:  Blood pressure 100 to 106/56, mean arterial pressure of 72, 
respiratory rate 16, temperature afebrile.  Pulse rate 93 and regular.



NEUROLOGIC EXAMINATION:  The patient is examined in the presence of her 
.  She is awake, alert, oriented to person, place, and time.  Speech is 
clear.  Naming, repetition, fluency, comprehension all within normal.



CRANIAL NERVE EXAMINATION:  Visual field intact, pupils reactive to light.  
Extraocular movements are normal.  No nystagmus, no facial sensory deficit, no 
facial asymmetry.  Hearing is normal.  Tongue is midline.  Good gag.



MOTOR EXAMINATION:  She was able to lift both upper extremities against the 
gravity.



DEEP TENDON REFLEXES:  Trace on both sides.  Both knees are absent, both ankles 
are absent.  Plantars are equivocal response on both sides.



SENSORY EXAMINATION:  Responds to pain symmetrically on both sides.



CONCLUSION:  Upon reviewing her history and neurological examination, the 
patient presented with witnessed generalized tonic-clonic activities which are 
probably secondary to holding one of the antiepileptic drugs.  The current 
examination does not show any lateralizing sign at present.  The patient seems 
to be fatigued, weak, could be from postictal phenomenon and other possible 
causes could be from medical issues.



BLOOD WORKUP:  WBC 16.9, hemoglobin 10.5, hematocrit 33.3, and platelets 330.  
Sodium 134, potassium 3.8, chloride 93, bicarbonate 24, BUN 9, GFR more than 
60.  Valproic acid less than 10.



RECOMMENDATIONS:

1.  Resume her primidone as she has been taking.  The other antiepileptic drugs 
including Lamictal and Depakote will be in place.

2.  Keppra which was added yesterday is discontinued.

3.  Proper hydration and correct the electrolytes.  

4. Appropriate antibiotic as per ID.



The patient should be fine with recommended medication.  If her seizures recur, 
probably she needs further workup and/or another medication should be increased 
to keep the therapeutic range.  The patient's condition has been discussed with 
the intensivist.  The patient will be followed closely with you.





__________________________________________

William Reyna MD







cc:   



DD: 03/25/2017 13:25:40  1242

TT: 03/25/2017 14:51:43

Confirmation # [id number]

Dictation # [id number]

rn



03/27/2017 09:16:04

LORENA

## 2017-03-27 NOTE — CP.PCM.PN
Subjective





- Date & Time of Evaluation


Date of Evaluation: 03/27/17


Time of Evaluation: 18:03





- Subjective


Subjective: 


INFECTIOUS DISEASE ICU/CCU PROGRESS NOTE # 6


JAGDEEP JUÁREZ MD, FACP


3/26-27/2017





CLINICALLY BETTER BUT WEAK AND TIRED, MILD COUGH DEMANDING MORE MEDS.


APPARENTLY THAT'S WHAT GOT HER INTO TROUBLE AT THE [facility] 

[place].





RECEOMMEND A MODIFIED HARSHAD'S PROTOCOL OF VANCOMYCIN THAT RRUNS SOMETHING 

LIKE THE FOLLOWING,


VANCOMYCIN 125 MG PO QID X 14 DAYS, THEN


VANCO 125 BID X BID X 14 DAYS, THEN


VANCO 125 OD X 7 DAYS, THEN 


VANCO 125 EVERYOTHER DAY FOR 7 DAYS, THEN


125 MG EVERY 3 DAYS X 14 DAYS.





IF THIS DOESN'T WORK SHE IS A CANDIDATE FOR [employer]!!!





Objective





- Vital Signs/Intake and Output


Vital Signs (last 24 hours): 


 











Temp Pulse Resp BP Pulse Ox


 


 99 F   99 H  21   122/56 L  99 


 


 03/27/17 16:00  03/27/17 16:00  03/27/17 16:00  03/27/17 16:00  03/27/17 16:00








Intake and Output: 


 











 03/27/17 03/27/17





 06:59 18:59


 


Intake Total 770 


 


Output Total 2950 


 


Balance -2180 














- Medications


Medications: 


 Current Medications





Acetaminophen (Tylenol 325mg Tab)  650 mg PO Q4 PRN


   PRN Reason: Fever >100.4 F


   Last Admin: 03/16/17 09:14 Dose:  650 mg


Divalproex Sodium (Depakote Er)  500 mg PO HS [facility]


   Last Admin: 03/26/17 22:21 Dose:  500 mg


Divalproex Sodium (Depakote Er)  250 mg PO DAILY [facility]


   Last Admin: 03/27/17 09:39 Dose:  250 mg


Famotidine (Pepcid)  20 mg IVP Q12 [facility]


   Last Admin: 03/27/17 10:30 Dose:  20 mg


Guaifenesin (Robitussin)  200 mg PO Q4H [facility]


   Last Admin: 03/27/17 17:11 Dose:  200 mg


Heparin Sodium (Porcine) (Heparin)  5,000 units SC Q12 [facility]


   Last Admin: 03/27/17 09:35 Dose:  5,000 units


Metronidazole (Flagyl)  100 mls @ 100 mls/hr IVPB Q8 [facility]


   Last Admin: 03/27/17 14:00 Dose:  Not Given


Lamotrigine (Lamictal)  50 mg PO DAILY [facility]


   Last Admin: 03/27/17 09:36 Dose:  50 mg


Primidone (Mysoline)  75 mg PO HS [facility]


   Last Admin: 03/26/17 22:26 Dose:  75 mg


Primidone (Mysoline)  50 mg PO DAILY [facility]


   Last Admin: 03/27/17 09:37 Dose:  50 mg


Rifaximin (Xifaxan)  550 mg PO BID [facility]


   Last Admin: 03/27/17 17:11 Dose:  550 mg


Saccharomyces Boulardii (Florastor)  250 mg PO TID [facility]


   Last Admin: 03/27/17 17:12 Dose:  250 mg


Vancomycin HCl (Vancocin (Oral Or Rectal Use))  500 mg PO QID [facility]


   Last Admin: 03/27/17 17:09 Dose:  500 mg











- Labs


Labs: 


 





 03/26/17 06:18 





 03/26/17 06:18 





 











PT  14.8 SECONDS (9.7-12.2)  H  03/15/17  01:01    


 


INR  1.3   03/15/17  01:01    


 


APTT  37 SECONDS (21-34)  H  03/15/17  01:01    














- Constitutional


Appears: Non-toxic, Older Than Stated Age, Chronically Ill





- Head Exam


Head Exam: ATRAUMATIC





- Eye Exam


Eye Exam: Normal appearance





- ENT Exam


ENT Exam: Mucous Membranes Moist





- Neck Exam


Neck Exam: Normal Inspection





- Respiratory Exam


Respiratory Exam: Decreased Breath Sounds, NORMAL BREATHING PATTERN





- Cardiovascular Exam


Cardiovascular Exam: REGULAR RHYTHM





- GI/Abdominal Exam


GI & Abdominal Exam: Soft, Normal Bowel Sounds.  absent: Tenderness





- Rectal Exam


Rectal Exam: Deferred





- Neurological Exam


Neurological Exam: Alert, Awake





- Psychiatric Exam


Psychiatric exam: Anxious, Normal Mood





- Skin


Skin Exam: Warm.  absent: Normal Color





Assessment and Plan


(1) C. difficile colitis


Status: Acute





(2) Acute respiratory failure


Status: Acute





(3) GERD (gastroesophageal reflux disease)


Status: Suspected





(4) Knee injury


Status: Chronic





(5) Sepsis


Status: Acute





(6) Bladder incontinence


Status: Chronic





(7) Abdominal pain


Status: Acute





(8) Seizure disorder


Status: Acute

## 2017-03-27 NOTE — CP.PCM.PN
Subjective





- Date & Time of Evaluation


Date of Evaluation: 03/27/17


Time of Evaluation: 15:51





- Subjective


Subjective: 


CC: Follow up c Difficile colitis





In ICU, weak, 


Loose yellow BMs, negative for C Diff recently.


Rectal soreness.





Objective





- Vital Signs/Intake and Output


Vital Signs (last 24 hours): 


 











Temp Pulse Resp BP Pulse Ox


 


 99.2 F   86   20   130/73   98 


 


 03/27/17 12:00  03/27/17 12:00  03/27/17 12:00  03/27/17 12:00  03/27/17 12:00








Intake and Output: 


 











 03/27/17 03/27/17





 06:59 18:59


 


Intake Total 770 


 


Output Total 2950 


 


Balance -2180 














- Medications


Medications: 


 Current Medications





Acetaminophen (Tylenol 325mg Tab)  650 mg PO Q4 PRN


   PRN Reason: Fever >100.4 F


   Last Admin: 03/16/17 09:14 Dose:  650 mg


Divalproex Sodium (Depakote Er)  500 mg PO HS [facility]


   Last Admin: 03/26/17 22:21 Dose:  500 mg


Divalproex Sodium (Depakote Er)  250 mg PO DAILY [facility]


   Last Admin: 03/27/17 09:39 Dose:  250 mg


Famotidine (Pepcid)  20 mg IVP Q12 [facility]


   Last Admin: 03/27/17 10:30 Dose:  20 mg


Guaifenesin (Robitussin)  200 mg PO Q4H [facility]


   Last Admin: 03/27/17 14:02 Dose:  200 mg


Heparin Sodium (Porcine) (Heparin)  5,000 units SC Q12 [facility]


   Last Admin: 03/27/17 09:35 Dose:  5,000 units


Metronidazole (Flagyl)  100 mls @ 100 mls/hr IVPB Q8 [facility]


   Last Admin: 03/27/17 14:00 Dose:  Not Given


Lamotrigine (Lamictal)  50 mg PO DAILY [facility]


   Last Admin: 03/27/17 09:36 Dose:  50 mg


Primidone (Mysoline)  75 mg PO HS [facility]


   Last Admin: 03/26/17 22:26 Dose:  75 mg


Primidone (Mysoline)  50 mg PO DAILY [facility]


   Last Admin: 03/27/17 09:37 Dose:  50 mg


Rifaximin (Xifaxan)  550 mg PO BID [facility]


   Last Admin: 03/27/17 09:36 Dose:  550 mg


Saccharomyces Boulardii (Florastor)  250 mg PO TID [facility]


   Last Admin: 03/27/17 14:04 Dose:  250 mg


Vancomycin HCl (Vancocin (Oral Or Rectal Use))  500 mg PO QID [facility]


   Last Admin: 03/27/17 14:05 Dose:  500 mg











- Labs


Labs: 


 





 03/26/17 06:18 





 03/26/17 06:18 





 











PT  14.8 SECONDS (9.7-12.2)  H  03/15/17  01:01    


 


INR  1.3   03/15/17  01:01    


 


APTT  37 SECONDS (21-34)  H  03/15/17  01:01    














- Constitutional


Appears: Chronically Ill





- Head Exam


Head Exam: NORMOCEPHALIC





- Eye Exam


Eye Exam: absent: Scleral icterus





- Respiratory Exam


Respiratory Exam: Clear to Ausculation Bilateral





- Cardiovascular Exam


Cardiovascular Exam: REGULAR RHYTHM





- GI/Abdominal Exam


GI & Abdominal Exam: Soft, Hyperactive Bowel Sounds.  absent: Tenderness, 

Rebound





- Extremities Exam


Additional comments: 


Bilat pitting edema lower exts





Assessment and Plan


(1) Acute respiratory failure


Status: Acute





(2) Sepsis


Status: Acute





(3) GERD (gastroesophageal reflux disease)


Assessment & Plan: 


Stable


Status: Suspected





(4) C. difficile colitis


Assessment & Plan: 


Improving


Continue Vancomycin


Status: Acute

## 2017-03-28 NOTE — PN
DATE: 03/28/2017



NEUROLOGICAL PROBLEM:  Seizures.



PHYSICAL EXAMINATION:

VITAL SIGNS:  Blood pressure 115/51, mean at present 72, respiratory rate 16, temperature 99.3 degree
s Fahrenheit.

NEUROLOGIC:  The patient seems to be sound sleeping with mouth open.  The patient did not have any cl
inical seizures overnight.



An electroencephalogram has been reviewed.  It shows paroxysmal epileptiform activities, spike and wa
ve noted in the beginning, later more pronounced over right parietal as well as temporal region, miguel ángel
p wave activities with phase reversal at T4 and P4 region.



The patient's Depakote dose was increased to 500 b.i.d. and an extra 500 was given last night.  The p
atient tolerating Depakote dose well.  I will follow the Depakote level tomorrow.  In the meantime, w
ill watch her for seizure precautions.





__________________________________________

William Reyna MD







cc:



DD: 03/28/2017 07:31:21  1242

TT: 03/28/2017 07:56:23

Confirmation # [id number]

Dictation # [id number]

tn

## 2017-03-28 NOTE — EEG
DATE: 03/27/2017



This is a 16-channel electroencephalogram of awake and drowsy adult.  During the study, photic stimul
ation was performed.  



The resting electroencephalogram to begin with generalized spike and wave activities noted in bilater
al cortical leads which followed with generalized 2-3 Hz delta activity seen.  There are still polysp
mathew and wave activities predominantly seen over right parietal leads.  These followed with high ampli
tude theta activities.  There are generalized high amplitude delta activities followed with slow acti
vities.  There are intermittent generalized spike and wave activities noted over the right temporal a
s well as right parietal leads, phase reversal at P4 and T6 region.  This lasted for about 2-3 second
s.  The photic stimulation did not evoke driving response noted at 2-20 Hz.  However, polyspike and w
ave activities again noted over right temporal leads.  Phase reversal at T6.



IMPRESSION:  This is abnormal electroencephalogram because of persistent slowing superimposed with ge
neralized spike and wave activities to begin doing the recording, followed with localizing the spike 
and wave activities over the right temporal as well as the parietal leads suggestive of epileptiform 
focus.  Please correlate the finding with the neurological and radiological studies.





__________________________________________

William Reyna MD







cc:



DD: 03/27/2017 20:42:23  1242

TT: 03/28/2017 07:31:01

Confirmation # [id number]

Dictation # [id number]

mn

## 2017-03-28 NOTE — CP.PCM.PN
Subjective





- Date & Time of Evaluation


Date of Evaluation: 03/28/17


Time of Evaluation: 07:45





- Subjective


Subjective: 


F/U diarrhea.


Still with mult soft stools, but less


Abdomen feels better.


Denies RB< melena, fever, chills, HA, cough, hematuria, hemoptysis





Objective





- Vital Signs/Intake and Output


Vital Signs (last 24 hours): 


 











Temp Pulse Resp BP Pulse Ox


 


 99.3 F   97 H  21   115/51 L  98 


 


 03/28/17 04:00  03/28/17 04:00  03/28/17 04:00  03/28/17 04:00  03/28/17 04:00








Intake and Output: 


 











 03/28/17 03/28/17





 06:59 18:59


 


Intake Total 760 


 


Output Total 2400 


 


Balance -1640 














- Medications


Medications: 


 Current Medications





Acetaminophen (Tylenol 325mg Tab)  650 mg PO Q4 PRN


   PRN Reason: Fever >100.4 F


   Last Admin: 03/16/17 09:14 Dose:  650 mg


Divalproex Sodium (Depakote Er)  500 mg PO BID [facility]


Famotidine (Pepcid)  20 mg IVP Q12 [facility]


   Last Admin: 03/27/17 21:57 Dose:  20 mg


Guaifenesin (Robitussin)  200 mg PO Q4H [facility]


   Last Admin: 03/28/17 05:53 Dose:  200 mg


Metronidazole (Flagyl)  100 mls @ 100 mls/hr IVPB Q8 [facility]


   Last Admin: 03/28/17 05:51 Dose:  100 mls/hr


Lamotrigine (Lamictal)  50 mg PO DAILY [facility]


   Last Admin: 03/27/17 09:36 Dose:  50 mg


Primidone (Mysoline)  75 mg PO HS [facility]


   Last Admin: 03/27/17 22:04 Dose:  75 mg


Primidone (Mysoline)  50 mg PO DAILY [facility]


   Last Admin: 03/27/17 09:37 Dose:  50 mg


Rifaximin (Xifaxan)  550 mg PO BID [facility]


   Last Admin: 03/27/17 17:11 Dose:  550 mg


Saccharomyces Boulardii (Florastor)  250 mg PO TID [facility]


   Last Admin: 03/27/17 17:12 Dose:  250 mg


Vancomycin HCl (Vancocin (Oral Or Rectal Use))  500 mg PO QID [facility]


   Last Admin: 03/27/17 22:05 Dose:  500 mg











- Labs


Labs: 


 





 03/26/17 06:18 





 03/26/17 06:18 





 











PT  14.8 SECONDS (9.7-12.2)  H  03/15/17  01:01    


 


INR  1.3   03/15/17  01:01    


 


APTT  37 SECONDS (21-34)  H  03/15/17  01:01    














- Constitutional


Appears: Non-toxic





- Neck Exam


Neck Exam: absent: Tenderness





- Respiratory Exam


Respiratory Exam: Clear to Ausculation Bilateral





- Cardiovascular Exam


Cardiovascular Exam: RRR





- GI/Abdominal Exam


GI & Abdominal Exam: Soft, Normal Bowel Sounds.  absent: Distended, Guarding, 

Tenderness





- Extremities Exam


Extremities Exam: Pedal Edema





- Neurological Exam


Neurological Exam: Alert, Awake, Oriented x3





Assessment and Plan


(1) Acute respiratory failure


Assessment & Plan: 


Better


Status: Acute





(2) Sepsis


Assessment & Plan: 


Improving.  WBC down to 13


Status: Acute





(3) Abdominal pain


Assessment & Plan: 


Better


Status: Acute





(4) GERD (gastroesophageal reflux disease)


Status: Suspected





(5) Colitis


Assessment & Plan: 


c difficile.  Improving.  Continue meds,  2 stools are neg


Status: Acute





(6) Diarrhea


Status: Acute





(7) Seizure


Status: Acute

## 2017-03-28 NOTE — CP.PCM.DIS
Provider





- Provider


Date of Admission: 


03/15/17 03:09





Attending physician: 


Ab Ford MD





Time Spent in preparation of Discharge (in minutes): 45





Diagnosis





- Discharge Diagnosis


(1) Septic shock


Status: Acute





(2) Fluid overload


Status: Acute








Hospital Course





- Lab Results


Lab Results: 


 Micro Results





03/22/17 20:42   Naris   MRSA Culture (Admit) - Final


                            MRSA NOT DETECTED


03/21/17 Unknown   Nose   MRSA Culture - Final


                                MRSA NOT DETECTED


03/15/17 04:51   Naris   MRSA Culture (Admit) - Final


                            MRSA NOT DETECTED





 Most Recent Lab Values











WBC  16.2 K/uL (4.8-10.8)  H  03/28/17  13:35    


 


RBC  3.97 Mil/uL (3.80-5.20)   03/28/17  13:35    


 


Hgb  11.0 g/dL (11.0-16.0)   03/28/17  13:35    


 


Hct  33.7 % (34.0-47.0)  L  03/28/17  13:35    


 


MCV  84.7 fL (81.0-99.0)   03/28/17  13:35    


 


MCH  27.6 pg (27.0-31.0)   03/28/17  13:35    


 


MCHC  32.6 g/dL (33.0-37.0)  L  03/28/17  13:35    


 


RDW  16.0 % (11.5-14.5)  H  03/28/17  13:35    


 


Plt Count  261 K/uL (130-400)   03/28/17  13:35    


 


MPV  7.8 fL (7.2-11.7)   03/28/17  13:35    


 


Neut % (Auto)  83.1 % (50.0-75.0)  H  03/28/17  13:35    


 


Lymph % (Auto)  9.9 % (20.0-40.0)  L  03/28/17  13:35    


 


Mono % (Auto)  6.6 % (0.0-10.0)   03/28/17  13:35    


 


Eos % (Auto)  0.1 % (0.0-4.0)   03/28/17  13:35    


 


Baso % (Auto)  0.3 % (0.0-2.0)   03/28/17  13:35    


 


Neut #  13.4 K/uL (1.8-7.0)  H  03/28/17  13:35    


 


Lymph #  1.6 K/uL (1.0-4.3)   03/28/17  13:35    


 


Mono #  1.1 K/uL (0.0-0.8)  H  03/28/17  13:35    


 


Eos #  0.0 K/uL (0.0-0.7)   03/28/17  13:35    


 


Baso #  0.0 K/uL (0.0-0.2)   03/28/17  13:35    


 


Neutrophils % (Manual)  84 % (50-75)  H  03/28/17  13:35    


 


Band Neutrophils %  8 % (0-2)  H  03/24/17  08:19    


 


Lymphocytes % (Manual)  12 % (20-40)  L  03/28/17  13:35    


 


Reactive Lymphs %  2 % (0-0)  H  03/24/17  08:19    


 


Monocytes % (Manual)  4 % (0-10)   03/28/17  13:35    


 


Metamyelocytes %  3 % (0-0)  H  03/24/17  08:19    


 


Myelocytes %  1 % (0-0)  H  03/24/17  08:19    


 


Nucleated RBC %  2 % (0-0)  H  03/23/17  06:25    


 


Toxic Granulation  Present   03/22/17  06:41    


 


Dohle Bodies  Present   03/15/17  13:55    


 


Jos Rods     03/15/17  13:55    


 


Platelet Estimate  Normal  (NORMAL)   03/28/17  13:35    


 


Plt Clumps, EDTA  Present   03/17/17  05:59    


 


Large Platelets  Present   03/19/17  06:28    


 


Giant Platelets  Present   03/15/17  06:08    


 


Polychromasia  Slight   03/23/17  06:25    


 


Hypochromasia (manual)  Slight   03/28/17  13:35    


 


Poikilocytosis (manual  Slight   03/28/17  13:35    


 


Anisocytosis (manual)  Slight   03/28/17  13:35    


 


Target Cells  Slight   03/28/17  13:35    


 


Tear Drop Cells  Slight   03/28/17  13:35    


 


Ovalocytes  Slight   03/18/17  06:36    


 


Mila Cells  Slight   03/18/17  06:36    


 


Smear Path Review     03/15/17  06:08    


 


PT  14.8 SECONDS (9.7-12.2)  H  03/15/17  01:01    


 


INR  1.3   03/15/17  01:01    


 


APTT  37 SECONDS (21-34)  H  03/15/17  01:01    


 


Puncture Site  Rr   03/20/17  05:11    


 


pCO2  31 mm/Hg (35-45)  L  03/20/17  05:11    


 


pO2  105 mm/Hg ()  H  03/20/17  05:11    


 


HCO3  16.3 mmol/L (21-28)  L  03/20/17  05:11    


 


ABG pH  7.28  (7.35-7.45)  L  03/20/17  05:11    


 


ABG Total CO2  15.6 mmol/L (22-28)  L  03/20/17  05:11    


 


ABG O2 Saturation  99.4 % (95-98)  H  03/20/17  05:11    


 


ABG Base Excess  -11.0 mmol/L (-2.0-3.0)  L  03/20/17  05:11    


 


ABG Hemoglobin  11.4 g/dL (11.7-17.4)  L  03/20/17  05:11    


 


ABG Carboxyhemoglobin  1.6 % (0.5-1.5)  H  03/20/17  05:11    


 


POC ABG HHb (Measured)  0.6 % (0.0-5.0)   03/20/17  05:11    


 


ABG Methemoglobin  1.0 % (0.0-3.0)   03/20/17  05:11    


 


Martin Test  Pos   03/20/17  05:11    


 


ABG Potassium  3.3 mmol/L (3.6-5.2)  L  03/15/17  05:25    


 


VBG pH  7.43  (7.32-7.43)   03/15/17  00:58    


 


VBG pCO2  33 mmHg (40-60)  L  03/15/17  00:58    


 


VBG HCO3  23.2 mmol/L  03/15/17  00:58    


 


VBG Total CO2  22.9 mmol/L (22-28)   03/15/17  00:58    


 


VBG O2 Sat (Calc)  85.8 % (40-65)  H  03/15/17  00:58    


 


VBG Base Excess  -1.7 mmol/L (0.0-2.0)  L  03/15/17  00:58    


 


VBG Potassium  3.6 mmol/L (3.6-5.2)   03/15/17  00:58    


 


A-a O2 Difference  141.0 mm/Hg  03/20/17  05:11    


 


Respiratory Index  1.3   03/20/17  05:11    


 


Hgb O2 Saturation  96.8 % (95.0-98.0)   03/20/17  05:11    


 


Sodium  122.0 mmol/l (132-148)  L  03/15/17  05:25    


 


Chloride  96.0 mmol/L ()  L  03/15/17  05:25    


 


Glucose  107 mg/dl ()  H  03/15/17  05:25    


 


Lactate  2.0 mmol/L (0.7-2.1)   03/15/17  05:25    


 


Mechanical Rate  14   03/19/17  05:07    


 


FiO2  40.0 %  03/20/17  05:11    


 


Tidal Volume  500   03/19/17  05:07    


 


PEEP  5   03/19/17  05:07    


 


Pressure Support  10   03/20/17  05:11    


 


CPAP  5   03/20/17  05:11    


 


Crit Value Called To  Jesus rn   03/15/17  00:58    


 


Crit Value Called By  David rrt   03/15/17  00:58    


 


Crit Value Read Back  Y   03/15/17  00:58    


 


Blood Gas Notified Time  103   03/15/17  00:58    


 


Sodium  126 mmol/L (132-148)  L  03/28/17  13:35    


 


Potassium  3.2 mmol/L (3.6-5.2)  L  03/28/17  13:35    


 


Chloride  94 mmol/L ()  L  03/28/17  13:35    


 


Carbon Dioxide  25 mmol/L (22-30)   03/28/17  13:35    


 


Anion Gap  11  (10-20)   03/28/17  13:35    


 


BUN  6 mg/dL (7-17)  L  03/28/17  13:35    


 


Creatinine  0.4 MG/DL (0.7-1.2)  L  03/28/17  13:35    


 


Est GFR ( Amer)  > 60   03/28/17  13:35    


 


Est GFR (Non-Af Amer)  > 60   03/28/17  13:35    


 


Random Glucose  105 mg/dL ()   03/28/17  13:35    


 


Serum Osmolality  261 mosm/kg (272-300)  L  03/15/17  10:43    


 


Lactic Acid  0.9 mmol/L (0.7-2.1)   03/18/17  12:52    


 


Calcium  7.4 mg/dl (8.6-10.4)  L  03/28/17  13:35    


 


Ionized Calcium  4.2 mg/dL (4.80-5.60)  L  03/24/17  23:57    


 


Phosphorus  3.6 mg/dL (2.5-4.5)   03/26/17  06:18    


 


Magnesium  1.7 mg/dL (1.6-2.3)   03/26/17  06:18    


 


Total Bilirubin  0.3 mg/dL (0.2-1.3)   03/28/17  13:35    


 


AST  22 U/L (14-36)   03/28/17  13:35    


 


ALT  14 U/L (9-52)   03/28/17  13:35    


 


Alkaline Phosphatase  37 U/L ()  L  03/28/17  13:35    


 


Total Creatine Kinase  < 20 U/L ()  L  03/22/17  19:20    


 


CK-MB (Mass)  1.01 ng/mL (0.0-3.38)   03/22/17  19:20    


 


Troponin I, Quant  0.0210 ng/mL (0.00-0.120)   03/22/17  19:20    


 


Total Protein  5.5 g/dL (6.3-8.3)  L  03/28/17  13:35    


 


Albumin  2.3 g/dL (3.5-5.0)  L D 03/28/17  13:35    


 


Globulin  3.2 gm/dL (2.2-3.9)   03/28/17  13:35    


 


Albumin/Globulin Ratio  0.7  (1.0-2.1)  L  03/28/17  13:35    


 


TSH 3rd Generation  7.83 mIU/L (0.46-4.68)  H  03/15/17  06:08    


 


Arterial Blood Potassium  3.3 mmol/L (3.6-5.2)  L  03/15/17  05:25    


 


Venous Blood Potassium  3.6 mmol/L (3.6-5.2)   03/15/17  00:58    


 


Urine Color  Yellow  (YELLOW)   03/26/17  22:40    


 


Urine Clarity  Clear  (Clear)   03/26/17  22:40    


 


Urine pH  6.0  (5.0-8.0)   03/26/17  22:40    


 


Ur Specific Gravity  1.005  (1.003-1.030)   03/26/17  22:40    


 


Urine Protein  Negative mg/dL (NEGATIVE)   03/26/17  22:40    


 


Urine Glucose (UA)  Normal mg/dL (Normal)   03/26/17  22:40    


 


Urine Ketones  Negative mg/dL (NEGATIVE)   03/26/17  22:40    


 


Urine Blood  Negative  (NEGATIVE)   03/26/17  22:40    


 


Urine Nitrate  Negative  (NEGATIVE)   03/26/17  22:40    


 


Urine Bilirubin  Negative  (NEGATIVE)   03/26/17  22:40    


 


Urine Urobilinogen  Normal mg/dL (0.2-1.0)   03/26/17  22:40    


 


Ur Leukocyte Esterase  Neg Gabriella/uL (Negative)   03/26/17  22:40    


 


Urine WBC (Auto)  1 /hpf (0-5)   03/26/17  22:40    


 


Urine RBC (Auto)  < 1 /hpf (0-3)   03/26/17  22:40    


 


Urine Osmolality  275 mosm/kg (300-1000)  L  03/15/17  07:57    


 


Ur Random Sodium  7 mmol/L  03/15/17  07:57    


 


Urine Chloride  <15 mmol/L ()  L  03/15/17  07:57    


 


Valproic Acid  < 10.0 ug/mL (50.0-100.0)  L  03/25/17  09:45    


 


C. difficile Ag & Toxin  Negative  (NEGATIVE)   03/28/17  09:49    


 


Influenza Typ A,B (EIA)  Negative for flu a/b  (NEGATIVE)   03/15/17  06:21    














- Hospital Course


Hospital Course: 


Chief complaint:


Shortness of breath.





History present illness:


76-year-old female with history of seasonal disorder, chronic constipation, 

history of gastroesophageal reflux disease, osteoarthritis, multiple joint 

replacement surgery, hospitalized recently with a fall, injury.


Again patient was hospitalized with the colitis, abdominal distention.


Patient was discharged to the rehabitation, and she was doing well, she was 

able to be discharged, she started having high fever, chills, and shortness of 

breath.


Patient was immediately transferred to the [facility] emergency room, in 

the emergency room patient was noted to have respiratory distress, high fever, 

and chills, and the patient was hospitalized, and intubated in the emergency 

room.


Because of the current condition change in her situation patient needed 

respirated monitoring, and ventilator support.


Patient was initially hospitalized to the intensive care unit.





Upon admission to the ICU.  Patient was in severe hypotension, also having high 

fevers, chills.


Patient was also having elevated WBC.


Patient was hospitalized with the diagnosis of possible acute septic shock and 

respiratory failure and underlying colitis could not be ruled out at that time.





Past medical history:


Multiple arthritis problems, chronic constipation, chronic back problem, 

hypertension, seizure disorder.





Surgical history:


Appendectomy, cholecystectomy, joint replacement.  Both the knee and hips, 

history of hysterectomy





Allergy: Penicillin, influenza vaccine.





Patient while she was receiving vancomycin intravenously in the emergency room 

currently.  She become more respirated distance, and she become more intimate as

, hypertensive, acute respiratory distress, following that the patient was 

intubated.





Personal history:


Lifelong nonsmoker, nonalcoholic the patient lives with family members 

currently living in nursing home





Review of systems:


Patient is somewhat drowsy, sleepy at this time, on mechanical ventilator with 

sedation.


The blood pressure is on the low side, supported with medications.


Otherwise patient is moving.


Following commands otherwise.


Diarrhea noted.


Leg swelling noted.





On examination:


Vital signs reviewed.


Blood pressure is on the low side.


Patient is on ventilator, also.  Her to ventilation currently.


Chest good air entry bilaterally regular heart sound.  Nontender abdomen.  

Edema noted bilaterally.





Patient's labs reviewed.


Chest x-ray showing evidence of bilateral lower lung atelectasis.


WBC highly elevated.


Otherwise nonspecific labs.





Assessment/recommendation:


76-year-old female with history of seizure disorder, chronic constipation, 

hypertension, history of esophageal reflux disease, osteoarthritis, multiple 

joint replacement surgery recently hospitalized with acute colitis came to the 

emergency room currently from the nursing home with a sudden onset of febrile 

illness, condition, treated with acute respiratory failure following 

intravenous vancomycin injection, needing ventilator and respiratory support.


Patient is currently having possible severe septic shock associate with the 

elevated white count and fever.


Underlying colitis is possible.


The start the patient on Flagyl, by mouth vancomycin, rectal vancomycin, 

infectious disease evaluation, ventilator support.


IV fluid, fluid hydration, and the resuscitation.


Overall prognosis is guarded.


Spoke to the patient's brother and will follow the patient.








Course in the hospital.


Patient was initially admitted to the intensive care unit.


Patient.  Initial diagnosis was septic shock, acute respiratory failure, and 

the colitis.  Fluid resuscitation was done, patient started on treatment for 

acute colitis, including vancomycin and Flagyl.


Patient condition slowly improved.


She was stable and he got extubated after a few days later.


Patient was doing well, and the patient was 2 days later cancer to the floor.


During the stay in the medical floor patient developed acute fluid overload 

state, and transferred to the intensive care unit again with fluid overload, 

acute respiratory failure.


Patient was placed on BiPAP, her diuretics were given, and the patient 

immediately improved within 2 days.


Overall condition got better.


While she was getting recovering, she developed an episode of acute the seizure 

activities, and the head medications resumed back again.


Patient become more stable.


She was placed on the physical therapy, and she started moving around.


Patient currently stable, she will be discharged to the rehabitation for 

further management.


Continue the current medications.


Medications reviewed.


Discussed with the patient.


Physical therapy patient will be transferred to rehabitation





Discharge Exam





- Head Exam


Head Exam: ATRAUMATIC





Discharge Plan





- Follow Up Plan


Condition: GUARDED


Disposition: TRANSF TO SNF


Instructions:  Sepsis (GEN), Infectious Colitis (GEN)


Additional Instructions: 


OOB and Transfers with assistance

## 2017-03-31 NOTE — CP.PCM.PN
Subjective





- Date & Time of Evaluation


Date of Evaluation: 03/26/17


Time of Evaluation: 13:09





- Subjective


Subjective: 


Patient is feeling much better.


She is sitting up now.


She is able to sit up and stand up.


Participating in physical therapy.


Poorly eating.


Albumin is low





Vital signs reviewed.


Chest bilateral good air entry.


Incentive spirometer started.


Regular heart sound.  Abdomen nontender edema, still noted.





Labs reviewed.


WBC count is improving





Assessment/recommendation:


77-year-old female with history of seizure disorder, hypertension, colitis, 

history of arthritis, multiple joints, or jaw admitted with the severe sepsis, 

septic shock, acute respiratory failure, and C. difficile colitis.


Patient developed a fluid overload state, currently receiving IV Lasix, and the 

diuretics.


Bronchodilators, incentive sprain meter.


She said disorder, stable, on medications at this time, neurology evaluation 

appreciated





Objective





- Vital Signs/Intake and Output


Vital Signs (last 24 hours): 


 











Temp Pulse Resp BP Pulse Ox


 


 97.5 F L  106 H  20   120/57 L  100 


 


 03/28/17 16:00  03/28/17 16:00  03/28/17 16:00  03/28/17 16:00  03/28/17 16:00











- Labs


Labs: 


 





 03/28/17 13:35 





 03/28/17 13:35 





 











PT  14.8 SECONDS (9.7-12.2)  H  03/15/17  01:01    


 


INR  1.3   03/15/17  01:01    


 


APTT  37 SECONDS (21-34)  H  03/15/17  01:01

## 2017-03-31 NOTE — CP.PCM.PN
Subjective





- Date & Time of Evaluation


Date of Evaluation: 03/16/17


Time of Evaluation: 13:03





- Subjective


Subjective: 


Patient still on ventilator, hypotension noted, currently receiving IV 

hydration.


Patient is awake, otherwise.


But sedated.


Possible common.


On ventilator currently knee, full support ventilation.


Still having episodes of diarrhea.


Abdominal distention noted.





Vital signs reviewed.


The chest good air entry bilaterally regular heart sound, nontender abdomen, 

tympanic abdominal distention.


Edema bilaterally in the legs noted.





No sacral decubiti noted.





Overall prognosis is guarded.


WBC is improving.


Chest x-ray ET tube in position, but no clear infiltrate.





Assessment/recommendation:


77-year-old female with history of multiple medical problems arthritis, 

hypertension, seizure disorder, admitted now with severe sepsis, and septic 

shock complicated with a possible acute colitis.


Hypotension.


Patient still under critical situation.


.  Her to ventilation, and the blood pressure support the medications being 

done.


Will continue to monitor and will follow the patient





Objective





- Vital Signs/Intake and Output


Vital Signs (last 24 hours): 


 











Temp Pulse Resp BP Pulse Ox


 


 97.5 F L  106 H  20   120/57 L  100 


 


 03/28/17 16:00  03/28/17 16:00  03/28/17 16:00  03/28/17 16:00  03/28/17 16:00











- Labs


Labs: 


 





 03/28/17 13:35 





 03/28/17 13:35 





 











PT  14.8 SECONDS (9.7-12.2)  H  03/15/17  01:01    


 


INR  1.3   03/15/17  01:01    


 


APTT  37 SECONDS (21-34)  H  03/15/17  01:01

## 2017-03-31 NOTE — CP.PCM.PN
Subjective





- Date & Time of Evaluation


Date of Evaluation: 03/27/17


Time of Evaluation: 13:11





- Subjective


Subjective: 


Patient is feeling much better.


She is sitting up now.


She is able to sit up and stand up.


Participating in physical therapy.


Poorly eating.


Albumin is low





Vital signs reviewed.


Chest bilateral good air entry.


Incentive spirometer started.


Regular heart sound.  Abdomen nontender edema, still noted.





Labs reviewed.


WBC count is improving





Assessment/recommendation:


77-year-old female with history of seizure disorder, hypertension, colitis, 

history of arthritis, multiple joints, or jaw admitted with the severe sepsis, 

septic shock, acute respiratory failure, and C. difficile colitis.


Patient developed a fluid overload state, currently receiving IV Lasix, and the 

diuretics.


Bronchodilators, incentive sprain meter.


She said disorder, stable, on medications at this time, neurology evaluation 

appreciated





Will continue to monitor the patient.


Possible discharge plan to the rehabitation.  If the patient is able to 

participate in physical therapy





Objective





- Vital Signs/Intake and Output


Vital Signs (last 24 hours): 


 











Temp Pulse Resp BP Pulse Ox


 


 97.5 F L  106 H  20   120/57 L  100 


 


 03/28/17 16:00  03/28/17 16:00  03/28/17 16:00  03/28/17 16:00  03/28/17 16:00











- Labs


Labs: 


 





 03/28/17 13:35 





 03/28/17 13:35 





 











PT  14.8 SECONDS (9.7-12.2)  H  03/15/17  01:01    


 


INR  1.3   03/15/17  01:01    


 


APTT  37 SECONDS (21-34)  H  03/15/17  01:01

## 2017-03-31 NOTE — CP.PCM.PN
Subjective





- Date & Time of Evaluation


Date of Evaluation: 03/17/17


Time of Evaluation: 13:05





- Subjective


Subjective: 


Patient still on ventilator, hypotension noted, currently receiving IV 

hydration.


Patient is awake, otherwise.


But sedated.


Possible common.


On ventilator currently knee, full support ventilation.


Still having episodes of diarrhea.


Abdominal distention noted.





Vital signs reviewed.


The chest good air entry bilaterally regular heart sound, nontender abdomen, 

tympanic abdominal distention.


Edema bilaterally in the legs noted.





No sacral decubiti noted.





Overall prognosis is guarded.


WBC is improving.


Chest x-ray ET tube in position, but no clear infiltrate.





Assessment/recommendation:


77-year-old female with history of multiple medical problems arthritis, 

hypertension, seizure disorder, admitted now with severe sepsis, and septic 

shock complicated with a possible acute colitis.


Hypotension.


Patient still under critical situation.


.  Her to ventilation, and the blood pressure support the medications being 

done.


Will continue to monitor and will follow the patient





There is some improvement in the blood pressure noted.


Urine output is better.


Stool came as positive for C. difficile.


Currently on vancomycin, Flagyl.


ID evaluation is on.


Continue the current treatment





Objective





- Vital Signs/Intake and Output


Vital Signs (last 24 hours): 


 











Temp Pulse Resp BP Pulse Ox


 


 97.5 F L  106 H  20   120/57 L  100 


 


 03/28/17 16:00  03/28/17 16:00  03/28/17 16:00  03/28/17 16:00  03/28/17 16:00











- Labs


Labs: 


 





 03/28/17 13:35 





 03/28/17 13:35 





 











PT  14.8 SECONDS (9.7-12.2)  H  03/15/17  01:01    


 


INR  1.3   03/15/17  01:01    


 


APTT  37 SECONDS (21-34)  H  03/15/17  01:01

## 2017-03-31 NOTE — CP.PCM.PN
Subjective





- Date & Time of Evaluation


Date of Evaluation: 03/18/17


Time of Evaluation: 13:06





- Subjective


Subjective: 


There is some improvement in the blood pressure noted.


Urine output is better.


Stool came as positive for C. difficile.


Currently on vancomycin, Flagyl.


ID evaluation is on.


Continue the current treatment





Patient still on ventilator.


Responding.


Awake and responding, that patient wants the tube to be out.


Denies any chest pain or shortness of breath.


Abdominal distention, and diarrhea still persistently present








On examination, vital signs stable, chest good air entry bilaterally regular 

heart sound nontender abdomen edema, bilaterally noted.





Patient also receiving IV hydration, albumin.


Start the patient on feeding.


Continue the current treatment and will follow the patient





Objective





- Vital Signs/Intake and Output


Vital Signs (last 24 hours): 


 











Temp Pulse Resp BP Pulse Ox


 


 97.5 F L  106 H  20   120/57 L  100 


 


 03/28/17 16:00  03/28/17 16:00  03/28/17 16:00  03/28/17 16:00  03/28/17 16:00











- Labs


Labs: 


 





 03/28/17 13:35 





 03/28/17 13:35 





 











PT  14.8 SECONDS (9.7-12.2)  H  03/15/17  01:01    


 


INR  1.3   03/15/17  01:01    


 


APTT  37 SECONDS (21-34)  H  03/15/17  01:01

## 2019-03-14 ENCOUNTER — HOSPITAL ENCOUNTER (OUTPATIENT)
Dept: HOSPITAL 31 - C.CTH | Age: 79
End: 2019-03-14
Payer: MEDICARE

## 2019-03-27 ENCOUNTER — HOSPITAL ENCOUNTER (OUTPATIENT)
Dept: HOSPITAL 31 - C.USIC | Age: 79
End: 2019-03-27
Payer: MEDICARE

## 2019-03-27 DIAGNOSIS — E04.1: Primary | ICD-10-CM

## 2020-09-21 PROBLEM — S00.10X? CONTUSION OF EYELIDS AND PERIORBITAL AREA: Noted: 2020-09-21

## 2020-09-21 PROBLEM — H10.502: Noted: 2020-11-16

## 2020-09-21 PROBLEM — H01.026 BLEPHARITIS: Noted: 2021-01-21

## 2020-09-21 PROBLEM — T15.01X? CORNEAL FOREIGN BODY: Noted: 2021-01-21

## 2020-09-21 PROBLEM — H43.393 VITREOUS FLOATERS: Noted: 2020-09-15

## 2020-09-21 PROBLEM — H01.023 BLEPHARITIS: Noted: 2021-01-21

## 2020-09-21 PROBLEM — H00.16 CHALAZION: Noted: 2020-11-12

## 2020-09-21 PROBLEM — H02.056 TRICHIASIS: Noted: 2020-11-12

## 2020-09-21 PROBLEM — H04.123 DRY EYE SYNDROME: Noted: 2021-01-21

## 2021-01-21 ENCOUNTER — PREPPED CHART (OUTPATIENT)
Dept: URBAN - METROPOLITAN AREA CLINIC 110 | Facility: CLINIC | Age: 81
End: 2021-01-21

## 2022-09-14 ENCOUNTER — ESTABLISHED (OUTPATIENT)
Dept: URBAN - METROPOLITAN AREA CLINIC 110 | Facility: CLINIC | Age: 82
End: 2022-09-14

## 2022-09-14 DIAGNOSIS — Z96.1: ICD-10-CM

## 2022-09-14 DIAGNOSIS — H01.116: ICD-10-CM

## 2022-09-14 DIAGNOSIS — H10.13: ICD-10-CM

## 2022-09-14 DIAGNOSIS — H01.113: ICD-10-CM

## 2022-09-14 PROCEDURE — 92012 INTRM OPH EXAM EST PATIENT: CPT

## 2022-09-14 ASSESSMENT — TONOMETRY
OD_IOP_MMHG: 13
OS_IOP_MMHG: 10

## 2022-09-14 ASSESSMENT — VISUAL ACUITY
OD_SC: 20/80
OS_SC: 20/40-2

## 2022-09-23 ENCOUNTER — ESTABLISHED (OUTPATIENT)
Dept: URBAN - METROPOLITAN AREA CLINIC 110 | Facility: CLINIC | Age: 82
End: 2022-09-23

## 2022-09-23 DIAGNOSIS — H40.053: ICD-10-CM

## 2022-09-23 DIAGNOSIS — H35.033: ICD-10-CM

## 2022-09-23 DIAGNOSIS — H10.023: ICD-10-CM

## 2022-09-23 DIAGNOSIS — Z96.1: ICD-10-CM

## 2022-09-23 DIAGNOSIS — H52.7: ICD-10-CM

## 2022-09-23 PROCEDURE — 92202 OPSCPY EXTND ON/MAC DRAW: CPT

## 2022-09-23 PROCEDURE — 92015 DETERMINE REFRACTIVE STATE: CPT

## 2022-09-23 PROCEDURE — 92014 COMPRE OPH EXAM EST PT 1/>: CPT

## 2022-09-23 PROCEDURE — 92133 CPTRZD OPH DX IMG PST SGM ON: CPT

## 2022-09-23 ASSESSMENT — TONOMETRY
OD_IOP_MMHG: 20
OS_IOP_MMHG: 21

## 2022-09-23 ASSESSMENT — KERATOMETRY
OS_K1POWER_DIOPTERS: 43.00
OD_K2POWER_DIOPTERS: 54.50
OS_K2POWER_DIOPTERS: 45.25
OD_AXISANGLE_DEGREES: 090
OD_AXISANGLE2_DEGREES: 180
OS_AXISANGLE_DEGREES: 088
OD_K1POWER_DIOPTERS: 52.25
OS_AXISANGLE2_DEGREES: 178

## 2022-09-23 ASSESSMENT — VISUAL ACUITY
OS_SC: 20/50
OD_SC: 20/100

## 2024-05-22 NOTE — CP.PCM.CON
History of Present Illness





- History of Present Illness


History of Present Illness: 


CCM


78 yo female with hx Arthritis /Anxiety /HTN /Seizures /Back problems /GB Dz ,

BIBA b/o fever. also c/o epigastric pain and nausea. + diarrhea and hx 

C.diff.Pt not giving much hx to ED staff per notes and  code sepsis was 

activated. Started on Ab and after vanco started pt became red and had resp 

distress requiring intubation. Vanco stopped and pt given benadryl / steroids. 

Currently intubated and sedated  and unable to give hx. Pt being sent for Brain 

and Abdomen CT.


ROS_ as noted


All- PCN / Influenza vaccine


Social- no tob/ etoh/ drugs


 Meds- reviewed


FH- Unknown





PE  T-97.7  P-90  R-16  /65


Intubated, sedated


Pupils reactive


Neck- no jvd


Lungs- bilat bs


Heart-rr


aBd- bs+ , soft, nontender


Ext-no edema





Labs, ekg, x-rays-reviewed


A&P


Acute Resp Failure


Allergic Rxn


Sepsis


r/o C.Diff


Colitis


Hyponatremia


Hx HTN


Arthritis


Hx Anxiety


Hx Seizures





Admit to ICU


cont IV NS


vent support


check cultures


Rx Cipro/flagyl


check urine lytes /osm


check serum osm/ repeat lactate/ BMP /ABG


DVT & GI prophylaxis








Past Patient History





- Infectious Disease


Hx of Infectious Diseases: None





- Tetanus Immunizations


Tetanus Immunization: Unknown, >10 years Ago





- Past Medical History & Family History


Past Medical History?: Yes





- Past Social History


Smoking Status: Never Smoked





- CARDIAC


Hx Hypertension: Yes (STRESS TEST 2015 DR FORD)





- PULMONARY


Hx Respiratory Disorders: No





- NEUROLOGICAL


Hx Seizures: Yes (last one about 15 years ago)





- HEENT


Hx HEENT Problems: Yes


Hx Cataracts: Yes





- RENAL


Hx Chronic Kidney Disease: No





- ENDOCRINE/METABOLIC


Hx Endocrine Disorders: No





- HEMATOLOGICAL/ONCOLOGICAL


Hx Blood Disorders: No





- INTEGUMENTARY


Hx Dermatological Problems: No





- MUSCULOSKELETAL/RHEUMATOLOGICAL


Hx Arthritis: Yes





- GASTROINTESTINAL


Hx Gall Bladder Disease: Yes





- GENITOURINARY/GYNECOLOGICAL


Hx Genitourinary Disorders: Yes


Hx Incontinence: Yes





- PSYCHIATRIC


Hx Anxiety: Yes


Hx Substance Use: No





- SURGICAL HISTORY


Hx Appendectomy: Yes (at 16 years old)


Hx Cholecystectomy: Yes (around 1978)





- ANESTHESIA


Hx Anesthesia: Yes


Hx Anesthesia Reactions: Yes (difficulty waking up)


Hx Malignant Hyperthermia: No





Meds


Allergies/Adverse Reactions: 


 Allergies











Allergy/AdvReac Type Severity Reaction Status Date / Time


 


Influenza Virus Vaccines Allergy  COUGH Verified 03/15/17 01:03


 


Penicillins Allergy  URTICARIA Verified 03/15/17 01:03














- Medications


Medications: 


 Current Medications





Fentanyl Citrate 2,500 mcg/ (Sodium Chloride)  250 mls @ 5 mls/hr IV .Q24H ARIELLE; 

50 MCG/HR


   PRN Reason: Protocol











Results





- Vital Signs


Recent Vital Signs: 


 Last Vital Signs











Temp  97.7 F   03/15/17 02:03


 


Pulse  99 H  03/15/17 03:49


 


Resp  16   03/15/17 03:49


 


BP  141/65   03/15/17 03:49


 


Pulse Ox  97   03/15/17 03:49














- Labs


Result Diagrams: 


 03/15/17 06:08





 03/15/17 01:01





Assessment & Plan


(1) Acute respiratory failure


Status: Acute





(2) Allergic reaction caused by a drug


Status: Acute





(3) Sepsis


Status: Acute





(4) Hyponatremia


Status: Acute





(5) Hypertension


Status: Chronic   Priority: Low severe

## (undated) RX ORDER — NEOMYCIN SULFATE, POLYMYXIN B SULFATE AND DEXAMETHASONE 3.5; 10000; 1 MG/ML; [USP'U]/ML; MG/ML: 1 SUSPENSION OPHTHALMIC

## (undated) RX ORDER — NEOMYCIN SULFATE, POLYMYXIN B SULFATE AND DEXAMETHASONE 3.5; 10000; 1 MG/G; [USP'U]/G; MG/G: 1/4 OINTMENT OPHTHALMIC TWICE A DAY